# Patient Record
Sex: FEMALE | Race: WHITE | NOT HISPANIC OR LATINO
[De-identification: names, ages, dates, MRNs, and addresses within clinical notes are randomized per-mention and may not be internally consistent; named-entity substitution may affect disease eponyms.]

---

## 2018-04-04 ENCOUNTER — APPOINTMENT (OUTPATIENT)
Dept: ORTHOPEDIC SURGERY | Facility: CLINIC | Age: 78
End: 2018-04-04
Payer: MEDICARE

## 2018-04-04 VITALS
DIASTOLIC BLOOD PRESSURE: 74 MMHG | SYSTOLIC BLOOD PRESSURE: 133 MMHG | HEIGHT: 65.5 IN | BODY MASS INDEX: 21.4 KG/M2 | WEIGHT: 130 LBS | HEART RATE: 67 BPM

## 2018-04-04 DIAGNOSIS — Z80.9 FAMILY HISTORY OF MALIGNANT NEOPLASM, UNSPECIFIED: ICD-10-CM

## 2018-04-04 DIAGNOSIS — Z87.828 PERSONAL HISTORY OF OTHER (HEALED) PHYSICAL INJURY AND TRAUMA: ICD-10-CM

## 2018-04-04 DIAGNOSIS — Z78.9 OTHER SPECIFIED HEALTH STATUS: ICD-10-CM

## 2018-04-04 DIAGNOSIS — M17.0 BILATERAL PRIMARY OSTEOARTHRITIS OF KNEE: ICD-10-CM

## 2018-04-04 PROCEDURE — 20610 DRAIN/INJ JOINT/BURSA W/O US: CPT | Mod: LT

## 2018-04-04 PROCEDURE — 73562 X-RAY EXAM OF KNEE 3: CPT | Mod: 50

## 2018-04-04 PROCEDURE — 99204 OFFICE O/P NEW MOD 45 MIN: CPT | Mod: 25

## 2018-04-11 ENCOUNTER — OTHER (OUTPATIENT)
Age: 78
End: 2018-04-11

## 2018-04-11 DIAGNOSIS — M79.671 PAIN IN RIGHT FOOT: ICD-10-CM

## 2018-04-11 DIAGNOSIS — M79.672 PAIN IN LEFT FOOT: ICD-10-CM

## 2020-08-03 ENCOUNTER — APPOINTMENT (OUTPATIENT)
Dept: FAMILY MEDICINE | Facility: CLINIC | Age: 80
End: 2020-08-03
Payer: MEDICARE

## 2020-08-03 VITALS
HEART RATE: 63 BPM | OXYGEN SATURATION: 98 % | RESPIRATION RATE: 12 BRPM | WEIGHT: 123 LBS | BODY MASS INDEX: 20.16 KG/M2 | TEMPERATURE: 98.4 F

## 2020-08-03 VITALS — SYSTOLIC BLOOD PRESSURE: 130 MMHG | DIASTOLIC BLOOD PRESSURE: 70 MMHG

## 2020-08-03 DIAGNOSIS — M79.606 PAIN IN LEG, UNSPECIFIED: ICD-10-CM

## 2020-08-03 PROCEDURE — 99202 OFFICE O/P NEW SF 15 MIN: CPT
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
## 2020-09-09 ENCOUNTER — APPOINTMENT (OUTPATIENT)
Dept: FAMILY MEDICINE | Facility: CLINIC | Age: 80
End: 2020-09-09
Payer: MEDICARE

## 2020-09-09 VITALS — TEMPERATURE: 98.2 F | OXYGEN SATURATION: 98 % | RESPIRATION RATE: 12 BRPM | HEART RATE: 58 BPM

## 2020-09-09 PROCEDURE — 99213 OFFICE O/P EST LOW 20 MIN: CPT

## 2020-09-09 NOTE — ASSESSMENT
[FreeTextEntry1] : Visit dictation narrative on this 80-year-old white female who is coming in complaining of some left groin and hip pain the patient is going for physical therapy and they recommended to her to get x-rays of her hip and her lumbar sacral spine the patient feels well feels better with the physical therapy so I therefore explained to her that the pain that she feels in the groin is probably from her hip fibroid regular x-rays 2 views of both hips as well as views of the lumbar sacral spine the patient will have the x-rays performed\[facility] And a copy will come to my office and will check for that after I receive the results of diagnosis is 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

## 2020-09-10 LAB
BASOPHILS # BLD AUTO: 0.05 K/UL
BASOPHILS NFR BLD AUTO: 0.8 %
EOSINOPHIL # BLD AUTO: 0.08 K/UL
EOSINOPHIL NFR BLD AUTO: 1.3 %
HCT VFR BLD CALC: 40.4 %
HGB BLD-MCNC: 13.1 G/DL
IMM GRANULOCYTES NFR BLD AUTO: 0.2 %
LYMPHOCYTES # BLD AUTO: 2.36 K/UL
LYMPHOCYTES NFR BLD AUTO: 37.5 %
MAN DIFF?: NORMAL
MCHC RBC-ENTMCNC: 32.4 GM/DL
MCHC RBC-ENTMCNC: 33.8 PG
MCV RBC AUTO: 104.1 FL
MONOCYTES # BLD AUTO: 0.46 K/UL
MONOCYTES NFR BLD AUTO: 7.3 %
NEUTROPHILS # BLD AUTO: 3.34 K/UL
NEUTROPHILS NFR BLD AUTO: 52.9 %
PLATELET # BLD AUTO: 247 K/UL
RBC # BLD: 3.88 M/UL
RBC # FLD: 13.7 %
WBC # FLD AUTO: 6.3 K/UL

## 2020-09-11 LAB
ALBUMIN SERPL ELPH-MCNC: 4.6 G/DL
ALP BLD-CCNC: 98 U/L
ALT SERPL-CCNC: 13 U/L
ANION GAP SERPL CALC-SCNC: 15 MMOL/L
AST SERPL-CCNC: 14 U/L
BILIRUB SERPL-MCNC: 0.2 MG/DL
BUN SERPL-MCNC: 11 MG/DL
CALCIUM SERPL-MCNC: 10.1 MG/DL
CHLORIDE SERPL-SCNC: 100 MMOL/L
CHOLEST SERPL-MCNC: 228 MG/DL
CHOLEST/HDLC SERPL: 2.2 RATIO
CO2 SERPL-SCNC: 24 MMOL/L
CREAT SERPL-MCNC: 0.67 MG/DL
GLUCOSE SERPL-MCNC: 85 MG/DL
HDLC SERPL-MCNC: 105 MG/DL
LDLC SERPL CALC-MCNC: 112 MG/DL
POTASSIUM SERPL-SCNC: 5.6 MMOL/L
PROT SERPL-MCNC: 7.2 G/DL
SODIUM SERPL-SCNC: 139 MMOL/L
T4 FREE SERPL-MCNC: 1.6 NG/DL
TRIGL SERPL-MCNC: 53 MG/DL
TSH SERPL-ACNC: 0.75 UIU/ML

## 2020-11-02 ENCOUNTER — RX RENEWAL (OUTPATIENT)
Age: 80
End: 2020-11-02

## 2020-11-12 ENCOUNTER — APPOINTMENT (OUTPATIENT)
Dept: FAMILY MEDICINE | Facility: CLINIC | Age: 80
End: 2020-11-12
Payer: MEDICARE

## 2020-11-12 VITALS
BODY MASS INDEX: 20.42 KG/M2 | HEART RATE: 67 BPM | TEMPERATURE: 98.2 F | OXYGEN SATURATION: 97 % | RESPIRATION RATE: 12 BRPM | WEIGHT: 124.6 LBS

## 2020-11-12 VITALS — SYSTOLIC BLOOD PRESSURE: 140 MMHG | DIASTOLIC BLOOD PRESSURE: 70 MMHG

## 2020-11-12 PROCEDURE — 99397 PER PM REEVAL EST PAT 65+ YR: CPT | Mod: 25

## 2020-11-12 PROCEDURE — 99072 ADDL SUPL MATRL&STAF TM PHE: CPT

## 2020-11-12 PROCEDURE — 36415 COLL VENOUS BLD VENIPUNCTURE: CPT

## 2020-11-12 NOTE — ASSESSMENT
[FreeTextEntry1] : This is dictation narrative this 80-year-old female who is coming today for a little checkup the patient states that not realizing it and when she awoke at night last week she had a seizure he has a history of a seizure disorder she does not take her medications on a regular basis he is presently on Dilantin 150 mg a day but she says that for many many day she doesn't take it at whatever but she does not take a regular basis I explained to her the importance of being more conscientious of her medications to avoid further seizures in the future also the patient was very anxious and depressed today she lives by herself she has her own home she does have family she has daughters and sons and so on but they really don't pay much attention to her she also has grandchildren who she per are really speaks to I spoke to the patient went for a long time and we discussed various things and I decided to put her on a antidepressant and I explained to her why going to use Celexa 10 mg a day and she is to return back here in the office in 3 weeks I told her to remain on her daily dose of Dilantin at night performed a Dilantin level today because she had taken the medication she says regulated for the past week after having a seizure

## 2020-11-16 LAB — PHENYTOIN SERPL QL: 11.8 UG/ML

## 2020-11-30 ENCOUNTER — APPOINTMENT (OUTPATIENT)
Dept: FAMILY MEDICINE | Facility: CLINIC | Age: 80
End: 2020-11-30
Payer: MEDICARE

## 2020-11-30 VITALS — HEART RATE: 86 BPM | OXYGEN SATURATION: 96 % | RESPIRATION RATE: 14 BRPM | TEMPERATURE: 98.2 F

## 2020-11-30 VITALS — DIASTOLIC BLOOD PRESSURE: 75 MMHG | SYSTOLIC BLOOD PRESSURE: 135 MMHG

## 2020-11-30 PROCEDURE — 99212 OFFICE O/P EST SF 10 MIN: CPT

## 2020-11-30 NOTE — ASSESSMENT
[FreeTextEntry1] : This is a narrative dictation for this 80-year-old female who came into the office today stating that she feels very generally on the Celebrex medication I had given her last week I explained to her that it's possible it can happen is a little unusual but I told her that week to stop the medication she's afraid to immediately stop it so I gave her a list of how to slow down by taking a half a tablet a day for 2-3 days then a half a tablet every other day and then one tablet a half a tablet rather twice a week and then to stop it so she was good with that and feels that she really does want to take any other medication after this

## 2021-02-17 ENCOUNTER — APPOINTMENT (OUTPATIENT)
Dept: FAMILY MEDICINE | Facility: CLINIC | Age: 81
End: 2021-02-17
Payer: MEDICARE

## 2021-02-17 VITALS — OXYGEN SATURATION: 98 % | TEMPERATURE: 97.3 F | RESPIRATION RATE: 14 BRPM | HEART RATE: 88 BPM

## 2021-02-17 DIAGNOSIS — Z23 ENCOUNTER FOR IMMUNIZATION: ICD-10-CM

## 2021-02-17 DIAGNOSIS — S09.93XA UNSPECIFIED INJURY OF FACE, INITIAL ENCOUNTER: ICD-10-CM

## 2021-02-17 PROCEDURE — 99212 OFFICE O/P EST SF 10 MIN: CPT | Mod: 25

## 2021-02-17 PROCEDURE — 99072 ADDL SUPL MATRL&STAF TM PHE: CPT

## 2021-02-17 PROCEDURE — 90471 IMMUNIZATION ADMIN: CPT

## 2021-02-17 PROCEDURE — 90714 TD VACC NO PRESV 7 YRS+ IM: CPT

## 2021-02-17 NOTE — ASSESSMENT
[FreeTextEntry1] : Narrative dictation this 81-year-old female who yesterday while at home fell out of bed onto the floor causing a hematoma around both orbits of her body she has a subconjunctival hemorrhage to her right eye the patient is alert and well-oriented she states she did not have any loss of consciousness she refused to go to emergency room she came in today with 2 large hematomas below both orbits with a superficial laceration to the right cheek the patient is afebrile what I have done is oriented facial x-ray bones which is going for 10 days in addition to that I ordered a DT shot I put the woman on Duricef 500 mg twice a day because of multiple superficial abrasions to her right face as per course the patient also states that she thought she had a seizure several weeks ago and she's wondering same thing taking that in mind even though neurologically she's intact today we sent her for referral for a neurological evaluation

## 2021-02-20 ENCOUNTER — NON-APPOINTMENT (OUTPATIENT)
Age: 81
End: 2021-02-20

## 2021-03-01 ENCOUNTER — APPOINTMENT (OUTPATIENT)
Dept: FAMILY MEDICINE | Facility: CLINIC | Age: 81
End: 2021-03-01
Payer: MEDICARE

## 2021-03-01 VITALS — RESPIRATION RATE: 12 BRPM | HEART RATE: 80 BPM | TEMPERATURE: 96.3 F | OXYGEN SATURATION: 98 %

## 2021-03-01 VITALS — SYSTOLIC BLOOD PRESSURE: 130 MMHG | DIASTOLIC BLOOD PRESSURE: 75 MMHG

## 2021-03-01 PROCEDURE — 99072 ADDL SUPL MATRL&STAF TM PHE: CPT

## 2021-03-01 PROCEDURE — 99212 OFFICE O/P EST SF 10 MIN: CPT
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
## 2021-03-04 ENCOUNTER — APPOINTMENT (OUTPATIENT)
Dept: NEUROLOGY | Facility: CLINIC | Age: 81
End: 2021-03-04
Payer: MEDICARE

## 2021-03-04 VITALS — WEIGHT: 130 LBS | TEMPERATURE: 96.3 F | BODY MASS INDEX: 21.4 KG/M2 | HEIGHT: 65.5 IN

## 2021-03-04 PROCEDURE — 99072 ADDL SUPL MATRL&STAF TM PHE: CPT

## 2021-03-04 PROCEDURE — 99204 OFFICE O/P NEW MOD 45 MIN: CPT

## 2021-03-11 ENCOUNTER — APPOINTMENT (OUTPATIENT)
Dept: NEUROLOGY | Facility: CLINIC | Age: 81
End: 2021-03-11
Payer: MEDICARE

## 2021-03-11 ENCOUNTER — RX RENEWAL (OUTPATIENT)
Age: 81
End: 2021-03-11

## 2021-03-11 PROCEDURE — 95819 EEG AWAKE AND ASLEEP: CPT

## 2021-03-11 PROCEDURE — 99072 ADDL SUPL MATRL&STAF TM PHE: CPT

## 2021-03-11 PROCEDURE — 93040 RHYTHM ECG WITH REPORT: CPT

## 2021-03-12 ENCOUNTER — APPOINTMENT (OUTPATIENT)
Dept: FAMILY MEDICINE | Facility: CLINIC | Age: 81
End: 2021-03-12
Payer: MEDICARE

## 2021-03-12 VITALS — RESPIRATION RATE: 12 BRPM | HEART RATE: 62 BPM | TEMPERATURE: 97.7 F | OXYGEN SATURATION: 98 %

## 2021-03-12 PROCEDURE — 99212 OFFICE O/P EST SF 10 MIN: CPT

## 2021-03-12 PROCEDURE — 99072 ADDL SUPL MATRL&STAF TM PHE: CPT

## 2021-03-12 NOTE — ASSESSMENT
[FreeTextEntry1] : This is a note of dictation on this 81-year-old female who came in today basically concerned that she has pain by her left jaw just below the TMJ joint the patient says she doesn't feel and she presses on it and examination was fairly age of the lower jaw and skull and there is no masses skin is freely movable over it I told her that it could be a consequence of the foreleg she had several weeks ago but did not feel any mass there I told her not to keep on playing with it keep it from her weight she might be for immediate to watch over the next few weeks and then we will see is any change but I don't see any reason for any specific type of therapy or x-rays at this point

## 2021-03-15 ENCOUNTER — NON-APPOINTMENT (OUTPATIENT)
Age: 81
End: 2021-03-15

## 2021-03-29 ENCOUNTER — LABORATORY RESULT (OUTPATIENT)
Age: 81
End: 2021-03-29

## 2021-03-29 ENCOUNTER — APPOINTMENT (OUTPATIENT)
Dept: FAMILY MEDICINE | Facility: CLINIC | Age: 81
End: 2021-03-29
Payer: MEDICARE

## 2021-03-29 VITALS — TEMPERATURE: 97.2 F | HEART RATE: 80 BPM | OXYGEN SATURATION: 98 % | RESPIRATION RATE: 16 BRPM

## 2021-03-29 PROCEDURE — 99212 OFFICE O/P EST SF 10 MIN: CPT | Mod: 25

## 2021-03-29 PROCEDURE — 36415 COLL VENOUS BLD VENIPUNCTURE: CPT

## 2021-03-29 PROCEDURE — 99072 ADDL SUPL MATRL&STAF TM PHE: CPT

## 2021-03-29 NOTE — ASSESSMENT
[FreeTextEntry1] : As an out of dictation on this 81-year-old female who is coming in because of back and knee pains and generalized arthritic pains and speaking to the patient I decided to try her on something a little differently want to try her on Celebrex 200 mg one a day I told the patient that she stop all other pain medication she's not to take any other nonsteroidal anti-inflammatories such as Naprosyn or aspirin and so on she can take Tylenol as an adjunct with this if necessary Celebrex will be taken once a day with a little bit of food she also requests a urinalysis even though she feels well which I applied chest and also she wants her blood count rechecked last time I checked with September which was absolutely normal so we will redo his CBC on the patient and I will notify her of the results when he returns on Wednesday

## 2021-03-29 NOTE — HISTORY OF PRESENT ILLNESS
[___ Days ago] : [unfilled] days ago [Constant] : constant [Severe] : severe [Heat] : heat [Activity] : with activity [In the Morning] : in the morning [Worsening] : worsening [de-identified] : lower back

## 2021-03-31 LAB
APPEARANCE: CLEAR
BASOPHILS # BLD AUTO: 0.03 K/UL
BASOPHILS NFR BLD AUTO: 0.3 %
BILIRUBIN URINE: NEGATIVE
BLOOD URINE: NORMAL
COLOR: YELLOW
EOSINOPHIL # BLD AUTO: 0.07 K/UL
EOSINOPHIL NFR BLD AUTO: 0.7 %
GLUCOSE QUALITATIVE U: NEGATIVE
HCT VFR BLD CALC: 36.1 %
HGB BLD-MCNC: 12.2 G/DL
IMM GRANULOCYTES NFR BLD AUTO: 0.3 %
KETONES URINE: NEGATIVE
LEUKOCYTE ESTERASE URINE: ABNORMAL
LYMPHOCYTES # BLD AUTO: 2.83 K/UL
LYMPHOCYTES NFR BLD AUTO: 29.8 %
MAN DIFF?: NORMAL
MCHC RBC-ENTMCNC: 33.8 GM/DL
MCHC RBC-ENTMCNC: 34.9 PG
MCV RBC AUTO: 103.1 FL
MONOCYTES # BLD AUTO: 0.81 K/UL
MONOCYTES NFR BLD AUTO: 8.5 %
NEUTROPHILS # BLD AUTO: 5.73 K/UL
NEUTROPHILS NFR BLD AUTO: 60.4 %
NITRITE URINE: NEGATIVE
PH URINE: 7
PLATELET # BLD AUTO: 208 K/UL
PROTEIN URINE: NEGATIVE
RBC # BLD: 3.5 M/UL
RBC # FLD: 12.7 %
SPECIFIC GRAVITY URINE: 1
UROBILINOGEN URINE: NORMAL
WBC # FLD AUTO: 9.5 K/UL

## 2021-04-22 ENCOUNTER — APPOINTMENT (OUTPATIENT)
Dept: NEUROLOGY | Facility: CLINIC | Age: 81
End: 2021-04-22
Payer: MEDICARE

## 2021-04-22 VITALS
SYSTOLIC BLOOD PRESSURE: 130 MMHG | BODY MASS INDEX: 21.4 KG/M2 | WEIGHT: 130 LBS | HEIGHT: 65.5 IN | TEMPERATURE: 97.2 F | DIASTOLIC BLOOD PRESSURE: 70 MMHG

## 2021-04-22 PROCEDURE — 99072 ADDL SUPL MATRL&STAF TM PHE: CPT

## 2021-04-22 PROCEDURE — 99214 OFFICE O/P EST MOD 30 MIN: CPT

## 2021-04-22 RX ORDER — LEVETIRACETAM 500 MG/1
500 TABLET, FILM COATED ORAL TWICE DAILY
Qty: 60 | Refills: 5 | Status: DISCONTINUED | COMMUNITY
Start: 2021-03-04 | End: 2021-04-22

## 2021-04-29 ENCOUNTER — APPOINTMENT (OUTPATIENT)
Dept: FAMILY MEDICINE | Facility: CLINIC | Age: 81
End: 2021-04-29
Payer: MEDICARE

## 2021-04-29 VITALS — WEIGHT: 121 LBS | BODY MASS INDEX: 19.83 KG/M2

## 2021-04-29 VITALS — RESPIRATION RATE: 14 BRPM | HEART RATE: 70 BPM | TEMPERATURE: 97.7 F | OXYGEN SATURATION: 98 %

## 2021-04-29 PROCEDURE — 99213 OFFICE O/P EST LOW 20 MIN: CPT

## 2021-04-29 PROCEDURE — 99072 ADDL SUPL MATRL&STAF TM PHE: CPT

## 2021-04-29 RX ORDER — LEVOTHYROXINE SODIUM 200 MCG
VIAL (EA) INTRAVENOUS
Refills: 0 | Status: DISCONTINUED | COMMUNITY
End: 2021-04-29

## 2021-04-29 NOTE — ASSESSMENT
[FreeTextEntry4] : Dictation for this preop evaluation for this 81-year-old female who is scheduled to have a right cataract extraction on May 14.  The patient feels well no complaints.  Blood pressure was excellent 135/75,  her heart had a regular rhythm S1-S2,  lungs were clear,  neck was supple.  It was not required for any blood tests or  EKG for this clearance.  On physical evaluation the patient is cleared for cataract surgery of the right eye.

## 2021-04-29 NOTE — HISTORY OF PRESENT ILLNESS
[FreeTextEntry1] : rt cataract extraction with IOL [FreeTextEntry2] : 5/10/2021 [FreeTextEntry3] : haseeb gresham

## 2021-05-07 ENCOUNTER — APPOINTMENT (OUTPATIENT)
Dept: FAMILY MEDICINE | Facility: CLINIC | Age: 81
End: 2021-05-07
Payer: MEDICARE

## 2021-05-07 VITALS
BODY MASS INDEX: 19.83 KG/M2 | SYSTOLIC BLOOD PRESSURE: 122 MMHG | DIASTOLIC BLOOD PRESSURE: 70 MMHG | WEIGHT: 121 LBS | RESPIRATION RATE: 14 BRPM

## 2021-05-07 VITALS — OXYGEN SATURATION: 96 % | HEART RATE: 75 BPM | TEMPERATURE: 97.5 F

## 2021-05-07 DIAGNOSIS — R09.81 NASAL CONGESTION: ICD-10-CM

## 2021-05-07 DIAGNOSIS — M41.9 SCOLIOSIS, UNSPECIFIED: ICD-10-CM

## 2021-05-07 DIAGNOSIS — R09.82 POSTNASAL DRIP: ICD-10-CM

## 2021-05-07 DIAGNOSIS — R00.8 OTHER ABNORMALITIES OF HEART BEAT: ICD-10-CM

## 2021-05-07 DIAGNOSIS — R05 COUGH: ICD-10-CM

## 2021-05-07 PROCEDURE — 36415 COLL VENOUS BLD VENIPUNCTURE: CPT

## 2021-05-07 PROCEDURE — 99214 OFFICE O/P EST MOD 30 MIN: CPT | Mod: 25

## 2021-05-07 PROCEDURE — 99072 ADDL SUPL MATRL&STAF TM PHE: CPT

## 2021-05-07 PROCEDURE — 93000 ELECTROCARDIOGRAM COMPLETE: CPT

## 2021-05-07 RX ORDER — CEFADROXIL 500 MG/1
500 CAPSULE ORAL TWICE DAILY
Qty: 10 | Refills: 1 | Status: COMPLETED | COMMUNITY
Start: 2021-02-17 | End: 2021-05-07

## 2021-05-07 NOTE — PLAN
[FreeTextEntry1] : see assessment.\par patient to have all reports sent to our office for review and record keeping.\par denies need for medication renewals at this time. \par notify office if worsening/persistent symptoms or new onset complaints/concerns, in the event of any emergency or life threatening condition ie. worsening s/s, worsening SOB, fevers, etc., go to the nearest emergency department and then notify office. \par patient verbalized understanding and agrees with plan of care.

## 2021-05-07 NOTE — ASSESSMENT
[FreeTextEntry1] : SOBOE/ cough/ sys. murmur & S3 on auscultation\par RUIZ, o/w offers no cardiac complaints today\par ECG; NSR, normal result.\par STAT CXR\par BW cbc/ cmp/ COVID19 PCR nasal swab - "labs drawn in office" \par \par sinus congestion/ PND\par start loratadine/ fluticasone\par recheck in 1 week\par stop mucinex\par plenty of fluids/ rest/ vitamin C\par \par seizure disorder\par well managed\par on Dilantin\par see Dr. Jane neurology- apt for Oct. 2021\par \par hypothyroid\par on Levothyroxine\par denies need for med renewal\par \par preventative health\par f/u with pcp for annual wellness 08/03/2021\par refuses vaccines\par no longer completing Pap\par f/u at annual- mammo/ colonoscopy/ DEXA

## 2021-05-07 NOTE — PHYSICAL EXAM
[No Acute Distress] : no acute distress [Well Nourished] : well nourished [Well Developed] : well developed [Normal Sclera/Conjunctiva] : normal sclera/conjunctiva [PERRL] : pupils equal round and reactive to light [No Lymphadenopathy] : no lymphadenopathy [Normal Rate] : normal rate  [Regular Rhythm] : with a regular rhythm [Normal S1, S2] : normal S1 and S2 [No Carotid Bruits] : no carotid bruits [No Edema] : there was no peripheral edema [Normal Posterior Cervical Nodes] : no posterior cervical lymphadenopathy [Normal Anterior Cervical Nodes] : no anterior cervical lymphadenopathy [No Rash] : no rash [Coordination Grossly Intact] : coordination grossly intact [Normal Gait] : normal gait [Normal Affect] : the affect was normal [Normal Insight/Judgement] : insight and judgment were intact [de-identified] : +PND, sinus congestion  [de-identified] : systolic heart murmur, S3 gallop on auscultation

## 2021-05-07 NOTE — HEALTH RISK ASSESSMENT
[No] : In the past 12 months have you used drugs other than those required for medical reasons? No [No falls in past year] : Patient reported no falls in the past year [0] : 2) Feeling down, depressed, or hopeless: Not at all (0) [] : No [de-identified] : minimal activity a/w "does not like to walk with ast device" [de-identified] : well balanced [[id number]] : 0

## 2021-05-07 NOTE — COUNSELING
[Fall prevention counseling provided] : Fall prevention counseling provided [Behavioral health counseling provided] : Behavioral health counseling provided [Sleep ___ hours/day] : Sleep [unfilled] hours/day [Engage in a relaxing activity] : Engage in a relaxing activity [Plan in advance] : Plan in advance [None] : None [Good understanding] : Patient has a good understanding of lifestyle changes and steps needed to achieve self management goal [de-identified] : infection prevention-\par Avoid close contact with those that are sick/if you are sick- limit contact with healthy individuals as much as possible. The CDC recommends staying home (except to get medical care/other necessities) 24 hours after being free of fever without use of fever-reducing medication. Cover your nose/mouth with a tissue when you cough/sneeze and throw the tissue in the garbage after use. Wash your hands with soap and water often or alcohol-based hand  if not available, especially after blowing nose/using tissue. \par Avoid touching eyes, nose, mouth to prevent spread of infection.\par clean and disinfect surfaces and objects contaminated with germs. \par \par For more information you can visit: https://www.cdc.gov

## 2021-05-07 NOTE — REVIEW OF SYSTEMS
[Earache] : no earache [Hearing Loss] : no hearing loss [Nosebleed] : no nosebleeds [Hoarseness] : no hoarseness [Nasal Discharge] : nasal discharge [Sore Throat] : no sore throat [Postnasal Drip] : no postnasal drip [Shortness Of Breath] : no shortness of breath [Wheezing] : no wheezing [Cough] : cough [Dyspnea on Exertion] : dyspnea on exertion [Muscle Pain] : no muscle pain [Skin Rash] : no skin rash [Negative] : Heme/Lymph [FreeTextEntry4] : sinus congestion

## 2021-05-07 NOTE — HISTORY OF PRESENT ILLNESS
[Congestion] : congestion [Moderate] : moderate [___ Days ago] : [unfilled] days ago [Constant] : constant [Cough] : cough [OTC Remedies] : OTC remedies [At Night] : at night [Worsening] : worsening [Sore Throat] : no sore throat [Wheezing] : no wheezing [Chills] : no chills [Anorexia] : no anorexia [Earache] : no earache [Fatigue] : not fatigue [Headache] : no headache [Fever] : no fever [FreeTextEntry7] : chest [FreeTextEntry5] : Mucinex  [FreeTextEntry8] : This is a 80y/o female pmhx hypothyroid/ seizure disorder/ anxiety/ arthritis/ OA knees, presents today with acute concern 5/10 cough productive of yellow sputum and chest congestion; SLIDTA as discussed above.\par patient denies fever, chills, muscle aches, diarrhea, N/V, sore throat.\par reports some sinus congestion and RUIZ o/w in no acute distress.\par patient reports use of OTC Mucinex with minimal relief o/w denies OTC remedies.\par denies known exposure to COVID19, flu, denies out of state travel. \par denies hx MI, htn, heart disease or other cardiac conditions, hx smoker quit 35yrs ago denies hx asthma/ copd/ other lung dx. \par o/w no major concerns.\par will evaluate and manage as discussed.

## 2021-05-10 ENCOUNTER — NON-APPOINTMENT (OUTPATIENT)
Age: 81
End: 2021-05-10

## 2021-05-10 PROBLEM — M41.9 SCOLIOSIS OF THORACIC SPINE: Status: ACTIVE | Noted: 2021-05-10

## 2021-05-10 LAB
ALBUMIN SERPL ELPH-MCNC: 4.4 G/DL
ALP BLD-CCNC: 106 U/L
ALT SERPL-CCNC: 10 U/L
ANION GAP SERPL CALC-SCNC: 15 MMOL/L
AST SERPL-CCNC: 17 U/L
BASOPHILS # BLD AUTO: 0.03 K/UL
BASOPHILS NFR BLD AUTO: 0.4 %
BILIRUB SERPL-MCNC: 0.2 MG/DL
BUN SERPL-MCNC: 9 MG/DL
CALCIUM SERPL-MCNC: 9.5 MG/DL
CHLORIDE SERPL-SCNC: 93 MMOL/L
CO2 SERPL-SCNC: 26 MMOL/L
CREAT SERPL-MCNC: 0.67 MG/DL
EOSINOPHIL # BLD AUTO: 0.23 K/UL
EOSINOPHIL NFR BLD AUTO: 3.3 %
GLUCOSE SERPL-MCNC: 78 MG/DL
HCT VFR BLD CALC: 37.7 %
HGB BLD-MCNC: 12.8 G/DL
IMM GRANULOCYTES NFR BLD AUTO: 0.3 %
LDH SERPL-CCNC: 160 U/L
LYMPHOCYTES # BLD AUTO: 2.34 K/UL
LYMPHOCYTES NFR BLD AUTO: 33.9 %
MAN DIFF?: NORMAL
MCHC RBC-ENTMCNC: 33.9 PG
MCHC RBC-ENTMCNC: 34 GM/DL
MCV RBC AUTO: 99.7 FL
MONOCYTES # BLD AUTO: 0.62 K/UL
MONOCYTES NFR BLD AUTO: 9 %
NEUTROPHILS # BLD AUTO: 3.67 K/UL
NEUTROPHILS NFR BLD AUTO: 53.1 %
PLATELET # BLD AUTO: 213 K/UL
POTASSIUM SERPL-SCNC: 5.7 MMOL/L
PROT SERPL-MCNC: 7.3 G/DL
RBC # BLD: 3.78 M/UL
RBC # FLD: 12 %
SARS-COV-2 N GENE NPH QL NAA+PROBE: NOT DETECTED
SODIUM SERPL-SCNC: 133 MMOL/L
WBC # FLD AUTO: 6.91 K/UL

## 2021-08-17 ENCOUNTER — RX RENEWAL (OUTPATIENT)
Age: 81
End: 2021-08-17

## 2021-08-23 ENCOUNTER — RX RENEWAL (OUTPATIENT)
Age: 81
End: 2021-08-23

## 2021-10-06 ENCOUNTER — RX RENEWAL (OUTPATIENT)
Age: 81
End: 2021-10-06

## 2021-10-12 ENCOUNTER — APPOINTMENT (OUTPATIENT)
Dept: FAMILY MEDICINE | Facility: CLINIC | Age: 81
End: 2021-10-12
Payer: MEDICARE

## 2021-10-12 VITALS — TEMPERATURE: 97.2 F | OXYGEN SATURATION: 96 % | HEART RATE: 50 BPM

## 2021-10-12 VITALS — DIASTOLIC BLOOD PRESSURE: 75 MMHG | SYSTOLIC BLOOD PRESSURE: 130 MMHG

## 2021-10-12 PROCEDURE — 99214 OFFICE O/P EST MOD 30 MIN: CPT

## 2021-10-25 ENCOUNTER — APPOINTMENT (OUTPATIENT)
Dept: NEUROLOGY | Facility: CLINIC | Age: 81
End: 2021-10-25

## 2021-11-01 NOTE — HISTORY OF PRESENT ILLNESS
[FreeTextEntry1] : Pt here for refill medication. Patient also requires a fine needle biopsy for breast nodule.

## 2021-11-01 NOTE — ASSESSMENT
[FreeTextEntry1] : Is an operative dictation for this 81-year-old female who came in for refill of prescriptions patient feels very well no complaints was afebrile blood pressure was 130/7570 heart had a regular rhythm S1-S2 lungs are clear and a prescription for Synthroid and Dilantin were refill she takes Dilantin 150 mg a day as well as Synthroid but she insists on brand medications no generics to return to the office when she needs additional refills

## 2021-12-13 ENCOUNTER — APPOINTMENT (OUTPATIENT)
Dept: FAMILY MEDICINE | Facility: CLINIC | Age: 81
End: 2021-12-13
Payer: MEDICARE

## 2021-12-13 VITALS — OXYGEN SATURATION: 98 % | TEMPERATURE: 97.7 F | HEART RATE: 61 BPM | RESPIRATION RATE: 14 BRPM

## 2021-12-13 VITALS — SYSTOLIC BLOOD PRESSURE: 130 MMHG | DIASTOLIC BLOOD PRESSURE: 80 MMHG

## 2021-12-13 PROCEDURE — 99214 OFFICE O/P EST MOD 30 MIN: CPT

## 2021-12-13 NOTE — ASSESSMENT
[FreeTextEntry4] : Visit noted dictation for medical clearance for this 81-year-old female who was going to [facility] in 2 days to have a mass removed from right axilla the patient had numerous mammographies and saw and no definitive diagnosis which is going in supposedly as a homeless as an outpatient to have this mass removed from her right axilla she feels well I reviewed her medical clearances her EKG reviewed by me is normal laboratory tests are basically all normal except for an elevated potassium at 5.5 make a note now that they should just repeat her electrolytes this is probably a lab dysfunction as far as the 5.5 potassium perhaps even noted no mention anything about hemolysis it soak was probably the cords she denies any medication that would increase her potassium level blood pressure was 130/80 heart had a regular rhythm S1 and S2 and her lungs were clear after reviewing everything the patient is cleared for surgical procedure on Wednesday

## 2021-12-13 NOTE — HISTORY OF PRESENT ILLNESS
[FreeTextEntry1] : Brest surgery ( mass removal) [FreeTextEntry2] : 12/15/201 [FreeTextEntry3] : Dr. Varghese

## 2021-12-29 ENCOUNTER — RX RENEWAL (OUTPATIENT)
Age: 81
End: 2021-12-29

## 2022-04-12 ENCOUNTER — APPOINTMENT (OUTPATIENT)
Dept: FAMILY MEDICINE | Facility: CLINIC | Age: 82
End: 2022-04-12

## 2022-06-13 ENCOUNTER — NON-APPOINTMENT (OUTPATIENT)
Age: 82
End: 2022-06-13

## 2022-07-12 ENCOUNTER — APPOINTMENT (OUTPATIENT)
Dept: RHEUMATOLOGY | Facility: CLINIC | Age: 82
End: 2022-07-12

## 2022-07-26 ENCOUNTER — APPOINTMENT (OUTPATIENT)
Dept: RHEUMATOLOGY | Facility: CLINIC | Age: 82
End: 2022-07-26

## 2022-08-29 ENCOUNTER — RX RENEWAL (OUTPATIENT)
Age: 82
End: 2022-08-29

## 2022-10-24 ENCOUNTER — APPOINTMENT (OUTPATIENT)
Dept: FAMILY MEDICINE | Facility: CLINIC | Age: 82
End: 2022-10-24

## 2022-10-24 VITALS
DIASTOLIC BLOOD PRESSURE: 80 MMHG | SYSTOLIC BLOOD PRESSURE: 118 MMHG | RESPIRATION RATE: 14 BRPM | TEMPERATURE: 97.1 F | HEART RATE: 56 BPM | BODY MASS INDEX: 19.37 KG/M2 | WEIGHT: 118.2 LBS | OXYGEN SATURATION: 98 %

## 2022-10-24 DIAGNOSIS — F41.9 ANXIETY DISORDER, UNSPECIFIED: ICD-10-CM

## 2022-10-24 DIAGNOSIS — M19.90 UNSPECIFIED OSTEOARTHRITIS, UNSPECIFIED SITE: ICD-10-CM

## 2022-10-24 DIAGNOSIS — Z00.00 ENCOUNTER FOR GENERAL ADULT MEDICAL EXAMINATION W/OUT ABNORMAL FINDINGS: ICD-10-CM

## 2022-10-24 DIAGNOSIS — N63.0 UNSPECIFIED LUMP IN UNSPECIFIED BREAST: ICD-10-CM

## 2022-10-24 PROCEDURE — G0444 DEPRESSION SCREEN ANNUAL: CPT

## 2022-10-24 PROCEDURE — G0439: CPT

## 2022-10-24 PROCEDURE — 36415 COLL VENOUS BLD VENIPUNCTURE: CPT

## 2022-10-24 RX ORDER — CITALOPRAM HYDROBROMIDE 10 MG/1
10 TABLET, FILM COATED ORAL DAILY
Qty: 30 | Refills: 2 | Status: DISCONTINUED | COMMUNITY
Start: 2020-11-12 | End: 2022-10-24

## 2022-10-24 RX ORDER — PHENYTOIN SODIUM 100 MG/1
100 CAPSULE ORAL
Qty: 90 | Refills: 3 | Status: ACTIVE | COMMUNITY
Start: 2020-11-02 | End: 1900-01-01

## 2022-10-24 RX ORDER — LORATADINE 10 MG/1
10 TABLET ORAL
Qty: 1 | Refills: 0 | Status: DISCONTINUED | COMMUNITY
Start: 2021-05-07 | End: 2022-10-24

## 2022-10-24 RX ORDER — PHENYTOIN 50 MG/1
50 TABLET, CHEWABLE ORAL
Qty: 90 | Refills: 3 | Status: ACTIVE | COMMUNITY
Start: 2020-09-12 | End: 1900-01-01

## 2022-10-24 NOTE — ASSESSMENT
[FreeTextEntry1] : CPE:\par -will order CBC w/ diff, CMP, Lipid, TSH and HgbA1c, labs to be drawn in office\par \par Screening:\par -Breast cancer screening: due, will order mammogram\par \par Vaccinations:\par Seasonal flu  - recommended\par Pneumococcal - recommended\par Tdap - 2/2021\par Shingles - recommended\par \par -previous notes and labs reviewed, consultants notes reviewed\par -patient expressed understanding of plan, all questions answered\par \par Lymphadenopathy in armpit and posterior neck\par hx of LAD s/p biopsy - found to likely be reactive\par will follow up with  dr. yao for swollen lymph node under arm\par \par Hypothyroidism:\par -will continue current dose of levothyroxine 100mcg daily\par -will check TFTs and make medications adjustments accordingly\par \par Arthritis \par continue celebrex as needed\par \par Hx of seizures\par continue dilantin\par \par Systolic murmur\par asymptomatic\par will refer to cardiology for TTE

## 2022-10-24 NOTE — PHYSICAL EXAM
[No Edema] : there was no peripheral edema [Soft] : abdomen soft [Non Tender] : non-tender [Non-distended] : non-distended [No Masses] : no abdominal mass palpated [Coordination Grossly Intact] : coordination grossly intact [No Focal Deficits] : no focal deficits [Normal Gait] : normal gait [Normal] : affect was normal and insight and judgment were intact [Normal Rate] : normal rate  [Regular Rhythm] : with a regular rhythm [Normal S1, S2] : normal S1 and S2 [de-identified] : systolic murmur LUSB [de-identified] : abdominal incision healing well

## 2022-10-24 NOTE — HEALTH RISK ASSESSMENT
[Former] : Former [10-14] : 10-14 [No] : In the past 12 months have you used drugs other than those required for medical reasons? No [No falls in past year] : Patient reported no falls in the past year [Assistive Device] : Patient uses an assistive device [0] : 2) Feeling down, depressed, or hopeless: Not at all (0) [PHQ-2 Negative - No further assessment needed] : PHQ-2 Negative - No further assessment needed [Patient reported mammogram was normal] : Patient reported mammogram was normal [Alone] : lives alone [Retired] : retired [YearQuit] : 30 years ago [de-identified] : cane [[id number]] : 0 [MammogramDate] : 11/21

## 2022-10-24 NOTE — REVIEW OF SYSTEMS
[Fever] : no fever [Chills] : no chills [Chest Pain] : no chest pain [Palpitations] : no palpitations [Lower Ext Edema] : no lower extremity edema [Shortness Of Breath] : no shortness of breath [Wheezing] : no wheezing [Cough] : no cough [Abdominal Pain] : no abdominal pain [Nausea] : no nausea [Constipation] : no constipation [Vomiting] : no vomiting [Dysuria] : no dysuria [Hematuria] : no hematuria [Joint Pain] : no joint pain [Muscle Pain] : no muscle pain [Headache] : no headache [Dizziness] : no dizziness [Anxiety] : no anxiety [Depression] : no depression
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
## 2022-10-25 ENCOUNTER — NON-APPOINTMENT (OUTPATIENT)
Age: 82
End: 2022-10-25

## 2022-10-25 LAB
ALBUMIN SERPL ELPH-MCNC: 4.3 G/DL
ALP BLD-CCNC: 143 U/L
ALT SERPL-CCNC: 11 U/L
ANION GAP SERPL CALC-SCNC: 9 MMOL/L
AST SERPL-CCNC: 15 U/L
BASOPHILS # BLD AUTO: 0.06 K/UL
BASOPHILS NFR BLD AUTO: 0.8 %
BILIRUB SERPL-MCNC: 0.3 MG/DL
BUN SERPL-MCNC: 9 MG/DL
CALCIUM SERPL-MCNC: 9.3 MG/DL
CHLORIDE SERPL-SCNC: 97 MMOL/L
CO2 SERPL-SCNC: 28 MMOL/L
CREAT SERPL-MCNC: 0.57 MG/DL
EGFR: 90 ML/MIN/1.73M2
EOSINOPHIL # BLD AUTO: 0.33 K/UL
EOSINOPHIL NFR BLD AUTO: 4.2 %
ESTIMATED AVERAGE GLUCOSE: 103 MG/DL
FOLATE SERPL-MCNC: >20 NG/ML
GLUCOSE SERPL-MCNC: 95 MG/DL
HBA1C MFR BLD HPLC: 5.2 %
HCT VFR BLD CALC: 34.6 %
HGB BLD-MCNC: 11.3 G/DL
IMM GRANULOCYTES NFR BLD AUTO: 0.1 %
LYMPHOCYTES # BLD AUTO: 2.14 K/UL
LYMPHOCYTES NFR BLD AUTO: 27.2 %
MAN DIFF?: NORMAL
MCHC RBC-ENTMCNC: 32.7 GM/DL
MCHC RBC-ENTMCNC: 33.8 PG
MCV RBC AUTO: 103.6 FL
MONOCYTES # BLD AUTO: 0.81 K/UL
MONOCYTES NFR BLD AUTO: 10.3 %
NEUTROPHILS # BLD AUTO: 4.53 K/UL
NEUTROPHILS NFR BLD AUTO: 57.4 %
PLATELET # BLD AUTO: 230 K/UL
POTASSIUM SERPL-SCNC: 4.9 MMOL/L
PROT SERPL-MCNC: 7.1 G/DL
RBC # BLD: 3.34 M/UL
RBC # FLD: 13.2 %
SODIUM SERPL-SCNC: 134 MMOL/L
T4 FREE SERPL-MCNC: 1.1 NG/DL
TSH SERPL-ACNC: 2.02 UIU/ML
VIT B12 SERPL-MCNC: 889 PG/ML
WBC # FLD AUTO: 7.88 K/UL

## 2022-11-07 ENCOUNTER — APPOINTMENT (OUTPATIENT)
Dept: CARDIOLOGY | Facility: CLINIC | Age: 82
End: 2022-11-07

## 2022-11-07 ENCOUNTER — NON-APPOINTMENT (OUTPATIENT)
Age: 82
End: 2022-11-07

## 2022-11-07 VITALS
WEIGHT: 118 LBS | BODY MASS INDEX: 19.66 KG/M2 | SYSTOLIC BLOOD PRESSURE: 140 MMHG | HEART RATE: 69 BPM | HEIGHT: 65 IN | DIASTOLIC BLOOD PRESSURE: 68 MMHG | RESPIRATION RATE: 14 BRPM

## 2022-11-07 DIAGNOSIS — R01.1 CARDIAC MURMUR, UNSPECIFIED: ICD-10-CM

## 2022-11-07 DIAGNOSIS — R53.83 OTHER FATIGUE: ICD-10-CM

## 2022-11-07 DIAGNOSIS — Z72.3 LACK OF PHYSICAL EXERCISE: ICD-10-CM

## 2022-11-07 DIAGNOSIS — Z82.49 FAMILY HISTORY OF ISCHEMIC HEART DISEASE AND OTHER DISEASES OF THE CIRCULATORY SYSTEM: ICD-10-CM

## 2022-11-07 PROCEDURE — 99204 OFFICE O/P NEW MOD 45 MIN: CPT | Mod: 25

## 2022-11-07 PROCEDURE — 93000 ELECTROCARDIOGRAM COMPLETE: CPT

## 2022-11-07 PROCEDURE — 93306 TTE W/DOPPLER COMPLETE: CPT

## 2022-11-07 RX ORDER — FLUTICASONE PROPIONATE 50 UG/1
50 SPRAY, METERED NASAL DAILY
Qty: 1 | Refills: 0 | Status: DISCONTINUED | COMMUNITY
Start: 2021-05-07 | End: 2022-11-07

## 2022-11-07 RX ORDER — CELECOXIB 200 MG/1
200 CAPSULE ORAL DAILY
Qty: 90 | Refills: 0 | Status: DISCONTINUED | COMMUNITY
Start: 2021-03-29 | End: 2022-11-07

## 2022-11-07 NOTE — DISCUSSION/SUMMARY
[FreeTextEntry1] : Patient is a 81yo F with family history CAD, former smoker, seizure disorder, hypothyroidism here for cardiac evaluation of a murmur. \par \par -Mild dyspnea/fatigue and reduced exercise tolerance. I recommend nuclear stress testing to ensure not due to ischemic heart disease but  patient not amenable. Difficult to get rides to office. Her echo shows grade 2 diastolic dysfunction with normal BiV systolic function, moderate AS/MR, moderate to severe TR with RVSP 39mmHg. May have component HFpEF contributing to symptoms. \par \par 1. Increase physical activity as tolerated \par 2. Recommend aggressive diet and lifestyle modifications \par 3.  Recommend nuclear stress testing for further evaluation if amenable\par 4. Consider adding trial of lasix\par 5. Follow up in a few months
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
## 2022-11-07 NOTE — PHYSICAL EXAM
[Soft] : abdomen soft [Non Tender] : non-tender [Normal] : no edema, no cyanosis, no clubbing, no varicosities [Moves all extremities] : moves all extremities [Alert and Oriented] : alert and oriented [Normal S1, S2] : normal S1, S2 [No Rub] : no rub [No Gallop] : no gallop [de-identified] : II/VI systolic murmur loudest LLSB and towards apex

## 2022-11-28 ENCOUNTER — APPOINTMENT (OUTPATIENT)
Dept: FAMILY MEDICINE | Facility: CLINIC | Age: 82
End: 2022-11-28

## 2022-11-28 VITALS
DIASTOLIC BLOOD PRESSURE: 60 MMHG | SYSTOLIC BLOOD PRESSURE: 140 MMHG | WEIGHT: 118 LBS | BODY MASS INDEX: 19.66 KG/M2 | HEIGHT: 65 IN

## 2022-11-28 PROCEDURE — 99214 OFFICE O/P EST MOD 30 MIN: CPT

## 2022-11-28 NOTE — PHYSICAL EXAM
[Normal Rate] : normal rate  [Regular Rhythm] : with a regular rhythm [Normal S1, S2] : normal S1 and S2 [No Edema] : there was no peripheral edema [Soft] : abdomen soft [Non Tender] : non-tender [Non-distended] : non-distended [No Masses] : no abdominal mass palpated [Coordination Grossly Intact] : coordination grossly intact [No Focal Deficits] : no focal deficits [Normal Gait] : normal gait [Normal] : affect was normal and insight and judgment were intact [de-identified] : systolic murmur LUSB [de-identified] : right axillary enlarged lymph node, nontender, soft, mobile, right breast with no erythema [de-identified] : abdominal incision healing well

## 2022-11-28 NOTE — ASSESSMENT
[FreeTextEntry1] : Anemia - iron deficiency anemia\par had workup with hematology Dr. 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

## 2022-11-28 NOTE — HISTORY OF PRESENT ILLNESS
[FreeTextEntry1] : follow up to  that was done [de-identified] : 83 y/o female with pmhx of anemia, seizure disorder, arthritis, anxiety, hypothyroidism, presents for follow up. Patient had recent blood work for her CPE on 10/24/22 and was found to have anemia with a hemoglobin of 11.3. Since then, patient was seen by her hematologist Dr. Dillon on 11/23/22. Patient was found to have iron deficiency anemia and started on iron infusions. States that she went for an iron infusion this past friday. Patient also discussed her enlarged axillary lymph node with Dr. Dillon. Patient states that she feels like the lymph node in the right axilla has been getting bigger and her right breast also has grown significantly in size. She was sent for mammogram and sono, which patient states was normal.

## 2023-01-12 ENCOUNTER — APPOINTMENT (OUTPATIENT)
Dept: FAMILY MEDICINE | Facility: CLINIC | Age: 83
End: 2023-01-12
Payer: MEDICARE

## 2023-01-12 VITALS — DIASTOLIC BLOOD PRESSURE: 78 MMHG | RESPIRATION RATE: 14 BRPM | SYSTOLIC BLOOD PRESSURE: 110 MMHG | TEMPERATURE: 97.2 F

## 2023-01-12 DIAGNOSIS — D64.9 ANEMIA, UNSPECIFIED: ICD-10-CM

## 2023-01-12 DIAGNOSIS — R59.0 LOCALIZED ENLARGED LYMPH NODES: ICD-10-CM

## 2023-01-12 DIAGNOSIS — R10.9 UNSPECIFIED ABDOMINAL PAIN: ICD-10-CM

## 2023-01-12 PROCEDURE — 99214 OFFICE O/P EST MOD 30 MIN: CPT

## 2023-01-13 PROBLEM — D64.9 ANEMIA: Status: ACTIVE | Noted: 2022-11-28

## 2023-01-13 PROBLEM — R59.0 LAD (LYMPHADENOPATHY), AXILLARY: Status: ACTIVE | Noted: 2022-11-28

## 2023-01-13 NOTE — PHYSICAL EXAM
[Normal Rate] : normal rate  [Regular Rhythm] : with a regular rhythm [Normal S1, S2] : normal S1 and S2 [No Edema] : there was no peripheral edema [Soft] : abdomen soft [Non-distended] : non-distended [No Masses] : no abdominal mass palpated [Coordination Grossly Intact] : coordination grossly intact [No Focal Deficits] : no focal deficits [Normal Gait] : normal gait [Normal] : affect was normal and insight and judgment were intact [de-identified] : systolic murmur LUSB [de-identified] : right axillary enlarged lymph node, nontender, soft, mobile, right breast with no erythema [de-identified] : abdominal incision well healed, mild tenderness to palpation of LLQ, hyperactive bowel sounds

## 2023-01-13 NOTE — HISTORY OF PRESENT ILLNESS
[FreeTextEntry8] : 81 y/o female with pmhx of anemia, seizure disorder, arthritis, anxiety, hypothyroidism, presents for abdominal discomfort. Patient states that on Friday she began having some abdominal pain and lots of belching.  States that at that time she also had some chills denies any fever.  States that she also felt as if she was constipated.  She was seen at urgent care on Monday and a CT of the abdomen was performed. CT abdomen revealed no evidence of obstruction, but evidence of irregular and heterogeneous mural thickening and luminal narrowing involving the ascending colon extending to the region of the hepatic flexure.  There was evidence of masslike lesions within the mesentery suggestive of mesenteric lymphadenopathy.  Neoplastic involvement of the ascending colon cannot be excluded.  She was recommended for colonoscopy.  Patient has not yet seen her GI doctor.  CT scan also revealed a heterogeneous masslike density in the perineum possibly in the right region of the uterine cervix.  Patient does not remember the last time she had a Pap smear.\par \par Patient states that the abdominal pain has diminished at this time however she is still having large amounts of belching.  States that every time she touches her abdomen she belches.  She has not tried any medications to help this.  States that she felt as if she was constipated so she has been taking MiraLAX which resulted in some diarrhea. Unable to precisely determine when last bowel movement was\par no weight loss

## 2023-01-13 NOTE — REVIEW OF SYSTEMS
[Abdominal Pain] : abdominal pain [Constipation] : constipation [Diarrhea] : diarrhea [Fever] : no fever [Chills] : no chills [Night Sweats] : no night sweats [Chest Pain] : no chest pain [Palpitations] : no palpitations [Nausea] : no nausea [Vomiting] : no vomiting [Dysuria] : no dysuria [Hematuria] : no hematuria [Headache] : no headache [Dizziness] : no dizziness

## 2023-01-13 NOTE — ASSESSMENT
[FreeTextEntry1] : Abdominal pain\par recent CT scan results reviewed with patient\par CT revealed evidence of irregular and heterogeneous mural thickening and luminal narrowing involving the ascending colon extending to the region of the hepatic flexure.  There was evidence of masslike lesions within the mesentery suggestive of mesenteric lymphadenopathy.  Neoplastic involvement of the ascending colon cannot be excluded. \par will refer to GI for colonoscopy\par for frequent belching - recommend trying simethicone\par can continue miralax as needed for constipation\par Patient advised that if she is unable to have a bowel movement, experiences severe abdominal pain, fevers or chills, or blood in the stools she should go to the emergency room for evaluation\par \par Abnormal CT findings and perineum\par Heterogeneous masslike density in perineum possibly related to region of uterine cervix identified on CT scan\par patient unsure of when her last pap smear was\par Will refer patient to GYN for further evaluation\par \par Anemia - iron deficiency anemia\par had workup with hematology Dr. 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

## 2023-01-19 ENCOUNTER — NON-APPOINTMENT (OUTPATIENT)
Age: 83
End: 2023-01-19

## 2023-02-01 ENCOUNTER — APPOINTMENT (OUTPATIENT)
Dept: ORTHOPEDIC SURGERY | Facility: CLINIC | Age: 83
End: 2023-02-01
Payer: MEDICARE

## 2023-02-01 VITALS
DIASTOLIC BLOOD PRESSURE: 80 MMHG | HEART RATE: 58 BPM | BODY MASS INDEX: 19.66 KG/M2 | WEIGHT: 118 LBS | SYSTOLIC BLOOD PRESSURE: 163 MMHG | HEIGHT: 65 IN

## 2023-02-01 DIAGNOSIS — M16.12 UNILATERAL PRIMARY OSTEOARTHRITIS, LEFT HIP: ICD-10-CM

## 2023-02-01 PROCEDURE — 99204 OFFICE O/P NEW MOD 45 MIN: CPT

## 2023-02-01 PROCEDURE — 73502 X-RAY EXAM HIP UNI 2-3 VIEWS: CPT

## 2023-02-01 NOTE — PHYSICAL EXAM
[Antalgic] : antalgic [LE] : Sensory: Intact in bilateral lower extremities [ALL] : dorsalis pedis, posterior tibial, femoral, popliteal, and radial 2+ and symmetric bilaterally [de-identified] : GENERAL APPEARANCE: Well nourished and hydrated, pleasant, alert, and oriented x 3. Appears their stated age. \par HEENT: Normocephalic, extraocular eye motion intact. Nasal septum midline. Oral cavity clear. External auditory canal clear. \par RESPIRATORY: Breath sounds clear and audible in all lobes. No wheezing, No accessory muscle use.\par CARDIOVASCULAR: No apparent abnormalities. No lower leg edema. No varicosities. Pedal pulses are palpable.\par NEUROLOGIC: Sensation is normal, no muscle weakness in the upper or lower extremities.\par DERMATOLOGIC: No apparent skin lesions, moist, warm, no rash.\par SPINE: Cervical spine appears normal and moves freely; thoracic spine appears normal and moves freely; lumbosacral spine appears normal and moves freely, normal, nontender.\par MUSCULOSKELETAL: Hands, wrists, and elbows are normal and move freely, shoulders are normal and move freely.  [de-identified] : Left hip exam shows signification stiffness with attempted ROM. antalgic gait, mild hip flexion contracture. 		  [de-identified] : 3V xray of the left hip done in the office today and reviewed by Dr. 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

## 2023-02-01 NOTE — DISCUSSION/SUMMARY
[Medication Risks Reviewed] : Medication risks reviewed [Surgical risks reviewed] : Surgical risks reviewed [de-identified] : 81 y/o F pt presents with bone on bone left hip osteoarthritis. The nature of her condition and treatment options were discussed in detail. The pt is a reasonable candidate for a left MOOKIE. The surgery was discussed in detail. The pt understands she needs a dual mobility implant due to her hx of epilepsy to decrease her risk of dislocation. She is on dilantin. She has failed conservative therapy such as antiinflammatories. She will talk with the surgical coordinator to schedule a left MOOKIE. All questions were answered. \par \par The patient is a 82 year individual with end stage arthritis of their left hip joint. Based upon the patient's continued symptoms and failure to respond to conservative treatment (including HA injections, cortisone injections, over the counter medications, and PT) I have recommended a left total hip arthroplasty for this patient. A long discussion took place with the patient describing what a total joint replacement is and what the procedure would entail. A total hip arthroplasty model, similar to the implant that will be used during the operation, was utilized to demonstrate and to discuss the various bearing surfaces of the implants. The hospitalization and post-operative care and rehabilitation were also discussed. The use of perioperative antibiotics and DVT prophylaxis were discussed. The risk, benefits and alternatives to a surgical intervention were discussed at length with the patient. The patient was also advised of risks related to the medical comorbidities, elevated body mass index (BMI), and smoking where applicable. We discussed how to reduce modifiable risk factors and encouraged smoking cessation were applicable. A lengthy discussion took place to review the most common complications including but not limited to: deep vein thrombosis, pulmonary embolus, heart attack, stroke, infection, wound breakdown, numbness, damage to nerves, tendon, muscles, arteries or other blood vessels, death and other possible complications from anesthesia. The patient was told that we will take steps to minimize these risks by using sterile technique, antibiotics and DVT prophylaxis when appropriate and follow the patient postoperatively in the office setting to monitor progress. The possibility of recurrent pain, no improvement in pain and actual worsening of pain were also discussed with the patient.\par The discharge plan of care focused on the patient going home following surgery. The patient was encouraged to make the necessary arrangements to have someone stay with them when they are discharged home. Following discharge, a home care nurse will visit the patient. The home care nurse will open your home care case and request home physical therapy services. Home physical therapy will commence following discharge provided it is appropriate and covered by the health insurance benefit plan. \par The benefits of surgery were discussed with the patient including the potential for improving her current clinical condition through operative intervention. Alternatives to surgical intervention including continued conservative management were also discussed in detail. All questions were answered to the satisfaction of the patient. The treatment plan of care, as well as a model of a total hip arthroplasty equivalent to the one that will be used for their total joint replacement, was shared with the patient. The patient agreed to the plan of care as well as the use of implants in their total joint replacement.

## 2023-02-01 NOTE — END OF VISIT
[FreeTextEntry3] : I, Felicity Fuentes, acted solely as a scribe for Dr. Sky Mcgregor on 02/01/2023

## 2023-02-01 NOTE — HISTORY OF PRESENT ILLNESS
[8] : a current pain level of 8/10 [Worsening] : worsening [de-identified] : 83 y/o F pt is here for left hip pain. she recalls she had groin pain while watering the plants in the spring and the pain is progressively worsening. She states it is severe and it is 8/10. She states the pain is located in the groin. she c/o inability to take care of her self and not able to do any house chores, even simple dusting. \par She describes the pain as burning. She feels her pain is exacerbated with walking. She takes no pain medication\par She has a hx of epilepsy on dilantin. She was last seen by Dr. Mcgregor in 2018 for advanced tricompartmental osteoarthritis of both knees and the knees are bothering her too. \par

## 2023-02-10 ENCOUNTER — APPOINTMENT (OUTPATIENT)
Dept: GASTROENTEROLOGY | Facility: CLINIC | Age: 83
End: 2023-02-10
Payer: MEDICARE

## 2023-02-10 VITALS
HEART RATE: 65 BPM | DIASTOLIC BLOOD PRESSURE: 80 MMHG | HEIGHT: 65 IN | OXYGEN SATURATION: 98 % | WEIGHT: 118 LBS | SYSTOLIC BLOOD PRESSURE: 130 MMHG | BODY MASS INDEX: 19.66 KG/M2 | RESPIRATION RATE: 16 BRPM

## 2023-02-10 PROCEDURE — 99203 OFFICE O/P NEW LOW 30 MIN: CPT

## 2023-02-10 RX ORDER — POLYETHYLENE GLYCOL 3350 17 G/17G
17 POWDER, FOR SOLUTION ORAL
Qty: 1 | Refills: 0 | Status: ACTIVE | COMMUNITY
Start: 2023-02-10 | End: 1900-01-01

## 2023-02-10 NOTE — ASSESSMENT
[FreeTextEntry1] : 83-year-old lady history of anxiety, arthritis, anemia, aortic stenosis, hypothyroidism, seizure disorder, former smoker, here for abnormal CT scan

## 2023-02-10 NOTE — HISTORY OF PRESENT ILLNESS
[FreeTextEntry1] : 83-year-old lady history of anxiety, arthritis, anemia, aortic stenosis, hypothyroidism, seizure disorder, former smoker, here for abnormal CT scan\par \par This CT scan which was performed January 10, 2023 reads:\par Abnormal appearance in the ascending colon with irregular mural thickening and luminal narrowing.  Associated low-attenuation masslike lesions within the mesentery suggestive of mesenteric lymphadenopathy.  Neoplastic involvement of the ascending colon cannot be excluded with a differential diagnosis including an inflammatory/infectious process.  1.7 x 1.7 cm focus in the mesentery suggestive of a necrotic lymph node.  4.2 x 3.6 cm larger poorly defined attenuated lesion also found inferiorly to this lymph node.  Colonoscopy suggested.\par Scattered diverticuli.\par Heterogeneous masslike density is seen in the perineum, possibly related to the region of the uterine cervix.\par \par Common bile duct dilation.  There is a mildly dilated CBD (9 to 10 mm) without any definite evidence of choledocholithiasis.  Distal stricture cannot be excluded (distal portion of the CBD is poorly visualized).  Associated mild degree of gallbladder distention is also seen but there is no intrahepatic biliary duct dilation..  Focal pancreatic lesion.\par Hepatic hemangioma.  2 cm, right lobe.\par \par She reports belching and midline abdominal bloat.  She also reports that she is constipated and feels vivienne to have 2 bowel movements a week.  She has not seen any blood or mucus in the bowel movements.  She has not lost any weight recently.  She had a surgery for lysis of adhesions at [facility] in May 2022 at which no abnormalities in her colon were noted.\par She reports dysphagia with solid food.  She also reports longstanding GERD.  She has a history of smoking distantly.

## 2023-03-14 ENCOUNTER — TRANSCRIPTION ENCOUNTER (OUTPATIENT)
Age: 83
End: 2023-03-14

## 2023-03-15 ENCOUNTER — APPOINTMENT (OUTPATIENT)
Dept: GASTROENTEROLOGY | Facility: HOSPITAL | Age: 83
End: 2023-03-15

## 2023-03-15 ENCOUNTER — OUTPATIENT (OUTPATIENT)
Dept: OUTPATIENT SERVICES | Facility: HOSPITAL | Age: 83
LOS: 1 days | End: 2023-03-15
Payer: MEDICARE

## 2023-03-15 ENCOUNTER — RESULT REVIEW (OUTPATIENT)
Age: 83
End: 2023-03-15

## 2023-03-15 DIAGNOSIS — R93.5 ABNORMAL FINDINGS ON DIAGNOSTIC IMAGING OF OTHER ABDOMINAL REGIONS, INCLUDING RETROPERITONEUM: ICD-10-CM

## 2023-03-15 DIAGNOSIS — T14.8 OTHER INJURY OF UNSPECIFIED BODY REGION: Chronic | ICD-10-CM

## 2023-03-15 PROCEDURE — 45380 COLONOSCOPY AND BIOPSY: CPT | Mod: XS

## 2023-03-15 PROCEDURE — 88305 TISSUE EXAM BY PATHOLOGIST: CPT

## 2023-03-15 PROCEDURE — 45380 COLONOSCOPY AND BIOPSY: CPT

## 2023-03-15 PROCEDURE — 88342 IMHCHEM/IMCYTCHM 1ST ANTB: CPT

## 2023-03-15 PROCEDURE — 88342 IMHCHEM/IMCYTCHM 1ST ANTB: CPT | Mod: 26

## 2023-03-15 PROCEDURE — 88305 TISSUE EXAM BY PATHOLOGIST: CPT | Mod: 26

## 2023-03-15 PROCEDURE — 43239 EGD BIOPSY SINGLE/MULTIPLE: CPT

## 2023-03-15 PROCEDURE — 45385 COLONOSCOPY W/LESION REMOVAL: CPT

## 2023-03-15 DEVICE — NAIL OSTEO 1.5X16MM STRL: Type: IMPLANTABLE DEVICE | Status: FUNCTIONAL

## 2023-03-15 NOTE — HISTORY OF PRESENT ILLNESS
[FreeTextEntry1] : 83 F h/o anxiety, arthritis, hypothyroidism, GUILLERMO surgery '22 [facility], here for EGD for GERD/dysphagia and colonoscopy for abnl CT (mass in asc colon). \par

## 2023-03-15 NOTE — ASSESSMENT
Still smoking  Urged smoking cessation  Cont present inhalers   Recheck 3m [FreeTextEntry1] : 83 F h/o anxiety, arthritis, hypothyroidism, GUILLERMO surgery '22 [facility], here for EGD for GERD/dysphagia and colonoscopy for abnl CT (mass in asc colon). \par \par The patient is medically optimized for procedure(s). All questions have been answered. The risks and benefits of the procedure have been discussed and the patient signed consent for the procedure. Preliminary results will be discussed when the patient wakes up from anesthesia, but definitive results will be discussed after the pathology report is issued in 5 business days.\par

## 2023-03-17 ENCOUNTER — APPOINTMENT (OUTPATIENT)
Dept: ORTHOPEDIC SURGERY | Facility: CLINIC | Age: 83
End: 2023-03-17
Payer: MEDICARE

## 2023-03-17 VITALS
BODY MASS INDEX: 19.66 KG/M2 | HEART RATE: 74 BPM | HEIGHT: 65 IN | DIASTOLIC BLOOD PRESSURE: 74 MMHG | SYSTOLIC BLOOD PRESSURE: 146 MMHG | WEIGHT: 118 LBS

## 2023-03-17 PROCEDURE — 99215 OFFICE O/P EST HI 40 MIN: CPT

## 2023-03-17 NOTE — DISCUSSION/SUMMARY
[de-identified] : MARTINA BURROUGHS is a 83 year old female who presents with left hip bone on bone arthritis.  She is in severe debilitating pain and very limited activities of daily living and walking even short distances.  The patient will follow up with her GI doctor or a colorectal surgeon regarding a plan for treatment of her possible colon cancer. She will also follow up with them to determine if it makes sense to undergo THR surgery before treatment of her colon cancer for pain relief as once she starts treatment, if chemotherapy is required, she will not be able to have a hip replacement due to the risk of infection.  She is certainly at increased perioperative risk given her possible active cancer, elderly age, and functional limitation.\par \par  A discussion was had with the patient regarding a left total hip replacement. Anterior and posterior exposures were discussed. For this patient an anterior approach is appropriate. A long discussion was had with the patient as what the total joint replacement would entail. A model was used to demonstrate the operation and to discuss bearing surfaces of the implants. The hospitalization and rehabilitation were discussed. The use of perioperative antibiotics and DVT prophylaxis were discussed. The risks, benefits and alternatives to surgical intervention were discussed at length with the patient. Specific risks discussed included: infection, wound breakdown, numbness and damage to nerves, tendon, muscle, arteries or other blood vessels, and the possibility of leg length discrepancy. The possibility of recurrent pain, no improvement in pain and actual worsening of the pain were also mentioned in conversation with the patient. Medical complications related to the patient's general medical health including deep vein thrombosis, pulmonary embolism, heart attack, stroke, death and other complications from anesthesia were discussed as well. The patient was told that we will take steps to minimize these risks by using sterile technique, antibiotics and DVT prophylaxis when appropriate and following the patient postoperatively in the clinic setting to monitor progress. The benefits of surgery were discussed with the patient including the potential to improve the current clinical condition through operative intervention. Alternatives to surgical intervention include continued conservative management which may yield less than optimal results in this particular patient. All questions were answered to the satisfaction of the patient.

## 2023-03-17 NOTE — HISTORY OF PRESENT ILLNESS
[de-identified] : Ms. MARTINA BURROUGHS is a 83 year old female presenting for evaluation of longstanding left hip pain. Patient notes the pain is localized to the left groin and is worse with all weightbearing activity and with all ROM. Patient has not had injections thus far. She has tried therapy without improvement. She has tried Pt without improvement. Patient is hopeful to discuss THR. \par Two days ago she had a colonoscopy and was told she has colon cancer that requires a resection.

## 2023-03-17 NOTE — PHYSICAL EXAM
[de-identified] : The patient appears well nourished  and in no apparent distress.  The patient is alert and oriented to person, place, and time.   Affect and mood appear normal. The head is normocephalic and atraumatic.  The eyes reveal normal sclera and extra ocular muscles are intact. The tongue is midline with no apparent lesions.  Skin shows normal turgor with no evidence of eczema or psoriasis.  No respiratory distress noted.  Sensation grossly intact.		  [de-identified] : Exam of the left hip shows hip flexion of 90 degrees(limited by pain), hip external rotation of 15 degrees, hip internal rotation of 0 degrees. There is severe pain throughout range of motion.\par 5/5 motor strength bilaterally distally. Sensation intact distally.		  [de-identified] : X-ray: AP view of the pelvis and 2 views of the left hip demonstrate bone on bone arthritis with femoral head collapse.

## 2023-03-17 NOTE — ADDENDUM
[FreeTextEntry1] : This note was authored by Tyrone Billingsley working as a medical scribe for Dr. Yobany Ventura. The note was reviewed, edited, and revised by Dr. Yobany Ventura whom is in agreement with the exam findings, imaging findings, and treatment plan. 03/17/2023

## 2023-03-17 NOTE — CONSULT LETTER
[Dear  ___] : Dear  [unfilled], [Consult Letter:] : I had the pleasure of evaluating your patient, [unfilled]. [Please see my note below.] : Please see my note below. [Consult Closing:] : Thank you very much for allowing me to participate in the care of this patient.  If you have any questions, please do not hesitate to contact me. [Sincerely,] : Sincerely, [FreeTextEntry2] : MONA STUART MD\par  [FreeTextEntry3] : Yobany Ventura MD\par Chief of Joint Replacement\par [facility] \par [facility]\par \par

## 2023-03-20 ENCOUNTER — NON-APPOINTMENT (OUTPATIENT)
Age: 83
End: 2023-03-20

## 2023-03-20 LAB — SURGICAL PATHOLOGY STUDY: SIGNIFICANT CHANGE UP

## 2023-03-21 ENCOUNTER — NON-APPOINTMENT (OUTPATIENT)
Age: 83
End: 2023-03-21

## 2023-03-23 ENCOUNTER — APPOINTMENT (OUTPATIENT)
Dept: COLORECTAL SURGERY | Facility: CLINIC | Age: 83
End: 2023-03-23
Payer: MEDICARE

## 2023-03-23 VITALS
WEIGHT: 125 LBS | RESPIRATION RATE: 14 BRPM | OXYGEN SATURATION: 98 % | DIASTOLIC BLOOD PRESSURE: 56 MMHG | BODY MASS INDEX: 20.09 KG/M2 | HEIGHT: 66 IN | SYSTOLIC BLOOD PRESSURE: 144 MMHG | HEART RATE: 63 BPM | TEMPERATURE: 97.7 F

## 2023-03-23 DIAGNOSIS — R93.5 ABNORMAL FINDINGS ON DIAGNOSTIC IMAGING OF OTHER ABDOMINAL REGIONS, INCLUDING RETROPERITONEUM: ICD-10-CM

## 2023-03-23 PROCEDURE — 99204 OFFICE O/P NEW MOD 45 MIN: CPT

## 2023-03-23 RX ORDER — METRONIDAZOLE 500 MG/1
500 TABLET ORAL
Qty: 3 | Refills: 0 | Status: ACTIVE | COMMUNITY
Start: 2023-03-23 | End: 1900-01-01

## 2023-03-23 RX ORDER — NEOMYCIN SULFATE 500 MG/1
500 TABLET ORAL
Qty: 6 | Refills: 0 | Status: ACTIVE | COMMUNITY
Start: 2023-03-23 | End: 1900-01-01

## 2023-03-23 RX ORDER — SODIUM SULFATE, MAGNESIUM SULFATE, AND POTASSIUM CHLORIDE 17.75; 2.7; 2.25 G/1; G/1; G/1
1479-225-188 TABLET ORAL
Qty: 24 | Refills: 0 | Status: COMPLETED | COMMUNITY
Start: 2023-03-13 | End: 2023-03-23
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
## 2023-03-23 NOTE — PHYSICAL EXAM
[No Rash or Lesion] : No rash or lesion [Alert] : alert [Oriented to Person] : oriented to person [Oriented to Place] : oriented to place [Oriented to Time] : oriented to time [Calm] : calm [de-identified] : No apparent distress [de-identified] : Normocephalic atraumatic [de-identified] : Moving all extremities x4

## 2023-03-23 NOTE — ASSESSMENT
[FreeTextEntry1] : Ms. 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  We discussed the R/B/A for a laparoscopic right hemicolectomy including preop surgical testing, mechanical and oral abx bowel prep, duration of procedure including margins for oncologic resection, criterion for hospital discharge, period for postoperative recovery as well as bowel function to be expected after partial colectomy. The potential for anastomotic leak ~2-3% was also detailed. Other risks of surgery such as wound infection, prolonged postop ileus, DVT, PE, pneumonia and MI were detailed.   She was advised on the potential need for chemotherapy should lymph nodes return positive.\par I have asked her to obtain a CT chest/abdomen/pelvis as baseline imaging and to have this completed at the [facility] imaging centers for my review.  We will plan to have her scheduled within the month and she is aware that medical and cardiac clearance will need to be obtained.\par

## 2023-03-28 ENCOUNTER — APPOINTMENT (OUTPATIENT)
Dept: FAMILY MEDICINE | Facility: CLINIC | Age: 83
End: 2023-03-28
Payer: MEDICARE

## 2023-03-28 ENCOUNTER — APPOINTMENT (OUTPATIENT)
Dept: CARDIOLOGY | Facility: CLINIC | Age: 83
End: 2023-03-28
Payer: MEDICARE

## 2023-03-28 ENCOUNTER — NON-APPOINTMENT (OUTPATIENT)
Age: 83
End: 2023-03-28

## 2023-03-28 VITALS
DIASTOLIC BLOOD PRESSURE: 72 MMHG | BODY MASS INDEX: 18.8 KG/M2 | SYSTOLIC BLOOD PRESSURE: 144 MMHG | HEIGHT: 66 IN | HEART RATE: 63 BPM | OXYGEN SATURATION: 97 % | RESPIRATION RATE: 15 BRPM | WEIGHT: 117 LBS

## 2023-03-28 VITALS
WEIGHT: 125 LBS | HEIGHT: 66 IN | BODY MASS INDEX: 20.09 KG/M2 | DIASTOLIC BLOOD PRESSURE: 60 MMHG | SYSTOLIC BLOOD PRESSURE: 136 MMHG

## 2023-03-28 DIAGNOSIS — I35.0 NONRHEUMATIC AORTIC (VALVE) STENOSIS: ICD-10-CM

## 2023-03-28 DIAGNOSIS — Z01.818 ENCOUNTER FOR OTHER PREPROCEDURAL EXAMINATION: ICD-10-CM

## 2023-03-28 DIAGNOSIS — I34.0 NONRHEUMATIC MITRAL (VALVE) INSUFFICIENCY: ICD-10-CM

## 2023-03-28 DIAGNOSIS — G40.909 EPILEPSY, UNSPECIFIED, NOT INTRACTABLE, W/OUT STATUS EPILEPTICUS: ICD-10-CM

## 2023-03-28 DIAGNOSIS — Z82.49 FAMILY HISTORY OF ISCHEMIC HEART DISEASE AND OTHER DISEASES OF THE CIRCULATORY SYSTEM: ICD-10-CM

## 2023-03-28 DIAGNOSIS — E03.9 HYPOTHYROIDISM, UNSPECIFIED: ICD-10-CM

## 2023-03-28 DIAGNOSIS — Z87.891 PERSONAL HISTORY OF NICOTINE DEPENDENCE: ICD-10-CM

## 2023-03-28 DIAGNOSIS — R06.00 DYSPNEA, UNSPECIFIED: ICD-10-CM

## 2023-03-28 DIAGNOSIS — Z01.810 ENCOUNTER FOR PREPROCEDURAL CARDIOVASCULAR EXAMINATION: ICD-10-CM

## 2023-03-28 DIAGNOSIS — I36.1 NONRHEUMATIC TRICUSPID (VALVE) INSUFFICIENCY: ICD-10-CM

## 2023-03-28 PROCEDURE — 93000 ELECTROCARDIOGRAM COMPLETE: CPT | Mod: NC

## 2023-03-28 PROCEDURE — 99214 OFFICE O/P EST MOD 30 MIN: CPT | Mod: 25

## 2023-03-28 PROCEDURE — 99214 OFFICE O/P EST MOD 30 MIN: CPT

## 2023-03-28 PROCEDURE — 93306 TTE W/DOPPLER COMPLETE: CPT

## 2023-03-28 RX ORDER — FUROSEMIDE 40 MG/1
40 TABLET ORAL
Qty: 30 | Refills: 0 | Status: DISCONTINUED | COMMUNITY
Start: 2022-07-05 | End: 2023-03-28

## 2023-03-28 NOTE — HISTORY OF PRESENT ILLNESS
[FreeTextEntry1] : patient is here because she recently had a colonoscopy and is here to discuss the next steps

## 2023-03-29 ENCOUNTER — NON-APPOINTMENT (OUTPATIENT)
Age: 83
End: 2023-03-29

## 2023-03-29 ENCOUNTER — APPOINTMENT (OUTPATIENT)
Dept: COLORECTAL SURGERY | Facility: CLINIC | Age: 83
End: 2023-03-29

## 2023-03-29 PROBLEM — Z01.818 PREOPERATIVE EXAMINATION: Status: RESOLVED | Noted: 2023-03-29 | Resolved: 2023-04-28

## 2023-03-29 PROBLEM — G40.909 SEIZURE DISORDER: Status: ACTIVE | Noted: 2020-09-12

## 2023-03-29 PROBLEM — E03.9 HYPOTHYROIDISM: Status: ACTIVE | Noted: 2020-09-12

## 2023-03-30 ENCOUNTER — NON-APPOINTMENT (OUTPATIENT)
Age: 83
End: 2023-03-30

## 2023-03-31 ENCOUNTER — APPOINTMENT (OUTPATIENT)
Dept: CARDIOLOGY | Facility: CLINIC | Age: 83
End: 2023-03-31
Payer: MEDICARE

## 2023-03-31 PROCEDURE — A9500: CPT

## 2023-03-31 PROCEDURE — 78452 HT MUSCLE IMAGE SPECT MULT: CPT

## 2023-03-31 PROCEDURE — 93015 CV STRESS TEST SUPVJ I&R: CPT

## 2023-04-01 ENCOUNTER — APPOINTMENT (OUTPATIENT)
Dept: CT IMAGING | Facility: CLINIC | Age: 83
End: 2023-04-01

## 2023-04-04 ENCOUNTER — APPOINTMENT (OUTPATIENT)
Dept: COLORECTAL SURGERY | Facility: CLINIC | Age: 83
End: 2023-04-04
Payer: MEDICARE

## 2023-04-04 PROCEDURE — 99443: CPT

## 2023-04-05 NOTE — ADDENDUM
[FreeTextEntry1] : Echo done today, normal LV function with EF 60-65%, grade II diastolic dysfunction, mild AS, moderate Mr, severe TR with mild PHTN RVSP 45mmHg. Fairly stable findings, at risk CHF but not clinically in CHF today. Plan for pharm nuclear stress to further risk stratify prior to surgery. \par \par \par 4/5/2023: Pharm nuclear stress negative for ischemia. CV stable at low-moderate risk for surgery.

## 2023-04-05 NOTE — ASSESSMENT
[FreeTextEntry1] : ECG: SR, no significant ST-T abnormalities and normal intervals \par \par \par \par ECHO 11/2022:\par 1. Normal LV size and function, EF 65-70%\par 2. Grade II diastolic dysfunction \par 3. Normal RV function\par 4. Moderate LAE,  borderline SANCHO\par 5. Moderate AS and MR\par 6. Moderate to severe TR, RVSP 39mmHg
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
## 2023-04-05 NOTE — PHYSICAL EXAM
[Normal S1, S2] : normal S1, S2 [No Rub] : no rub [No Gallop] : no gallop [Soft] : abdomen soft [Non Tender] : non-tender [Normal] : no edema, no cyanosis, no clubbing, no varicosities [Moves all extremities] : moves all extremities [Alert and Oriented] : alert and oriented [de-identified] : II/VI systolic murmur loudest LLSB and towards apex

## 2023-04-06 ENCOUNTER — RESULT REVIEW (OUTPATIENT)
Age: 83
End: 2023-04-06

## 2023-04-06 ENCOUNTER — OUTPATIENT (OUTPATIENT)
Dept: OUTPATIENT SERVICES | Facility: HOSPITAL | Age: 83
LOS: 1 days | End: 2023-04-06
Payer: MEDICARE

## 2023-04-06 VITALS
SYSTOLIC BLOOD PRESSURE: 138 MMHG | DIASTOLIC BLOOD PRESSURE: 54 MMHG | WEIGHT: 117.07 LBS | TEMPERATURE: 98 F | OXYGEN SATURATION: 100 % | HEIGHT: 66 IN | RESPIRATION RATE: 12 BRPM | HEART RATE: 64 BPM

## 2023-04-06 DIAGNOSIS — T14.8 OTHER INJURY OF UNSPECIFIED BODY REGION: Chronic | ICD-10-CM

## 2023-04-06 DIAGNOSIS — C18.2 MALIGNANT NEOPLASM OF ASCENDING COLON: ICD-10-CM

## 2023-04-06 DIAGNOSIS — Z01.818 ENCOUNTER FOR OTHER PREPROCEDURAL EXAMINATION: ICD-10-CM

## 2023-04-06 DIAGNOSIS — Z98.49 CATARACT EXTRACTION STATUS, UNSPECIFIED EYE: Chronic | ICD-10-CM

## 2023-04-06 LAB
ALBUMIN SERPL ELPH-MCNC: 3.1 G/DL — LOW (ref 3.3–5)
ALLERGY+IMMUNOLOGY DIAG STUDY NOTE: SIGNIFICANT CHANGE UP
ALP SERPL-CCNC: 125 U/L — HIGH (ref 40–120)
ALT FLD-CCNC: 14 U/L — SIGNIFICANT CHANGE UP (ref 12–78)
ANION GAP SERPL CALC-SCNC: 3 MMOL/L — LOW (ref 5–17)
APTT BLD: 28.2 SEC — SIGNIFICANT CHANGE UP (ref 27.5–35.5)
AST SERPL-CCNC: 11 U/L — LOW (ref 15–37)
BASOPHILS # BLD AUTO: 0.05 K/UL — SIGNIFICANT CHANGE UP (ref 0–0.2)
BASOPHILS NFR BLD AUTO: 0.7 % — SIGNIFICANT CHANGE UP (ref 0–2)
BILIRUB DIRECT SERPL-MCNC: <0.1 MG/DL — SIGNIFICANT CHANGE UP (ref 0–0.3)
BILIRUB INDIRECT FLD-MCNC: >0.1 MG/DL — LOW (ref 0.2–1)
BILIRUB SERPL-MCNC: 0.2 MG/DL — SIGNIFICANT CHANGE UP (ref 0.2–1.2)
BLD GP AB SCN SERPL QL: SIGNIFICANT CHANGE UP
BUN SERPL-MCNC: 11 MG/DL — SIGNIFICANT CHANGE UP (ref 7–23)
CALCIUM SERPL-MCNC: 9 MG/DL — SIGNIFICANT CHANGE UP (ref 8.5–10.1)
CEA SERPL-MCNC: 4.2 NG/ML — HIGH (ref 0–3.8)
CHLORIDE SERPL-SCNC: 101 MMOL/L — SIGNIFICANT CHANGE UP (ref 96–108)
CO2 SERPL-SCNC: 28 MMOL/L — SIGNIFICANT CHANGE UP (ref 22–31)
CREAT SERPL-MCNC: 0.5 MG/DL — SIGNIFICANT CHANGE UP (ref 0.5–1.3)
DIR ANTIGLOB POLYSPECIFIC INTERPRETATION: SIGNIFICANT CHANGE UP
EGFR: 92 ML/MIN/1.73M2 — SIGNIFICANT CHANGE UP
EOSINOPHIL # BLD AUTO: 0.33 K/UL — SIGNIFICANT CHANGE UP (ref 0–0.5)
EOSINOPHIL NFR BLD AUTO: 4.3 % — SIGNIFICANT CHANGE UP (ref 0–6)
GLUCOSE SERPL-MCNC: 89 MG/DL — SIGNIFICANT CHANGE UP (ref 70–99)
HCT VFR BLD CALC: 33.3 % — LOW (ref 34.5–45)
HGB BLD-MCNC: 11 G/DL — LOW (ref 11.5–15.5)
IMM GRANULOCYTES NFR BLD AUTO: 0 % — SIGNIFICANT CHANGE UP (ref 0–0.9)
INR BLD: 1.16 RATIO — SIGNIFICANT CHANGE UP (ref 0.88–1.16)
LYMPHOCYTES # BLD AUTO: 1.78 K/UL — SIGNIFICANT CHANGE UP (ref 1–3.3)
LYMPHOCYTES # BLD AUTO: 23.4 % — SIGNIFICANT CHANGE UP (ref 13–44)
MCHC RBC-ENTMCNC: 32.9 PG — SIGNIFICANT CHANGE UP (ref 27–34)
MCHC RBC-ENTMCNC: 33 GM/DL — SIGNIFICANT CHANGE UP (ref 32–36)
MCV RBC AUTO: 99.7 FL — SIGNIFICANT CHANGE UP (ref 80–100)
MONOCYTES # BLD AUTO: 0.81 K/UL — SIGNIFICANT CHANGE UP (ref 0–0.9)
MONOCYTES NFR BLD AUTO: 10.6 % — SIGNIFICANT CHANGE UP (ref 2–14)
NEUTROPHILS # BLD AUTO: 4.63 K/UL — SIGNIFICANT CHANGE UP (ref 1.8–7.4)
NEUTROPHILS NFR BLD AUTO: 60.9 % — SIGNIFICANT CHANGE UP (ref 43–77)
PLATELET # BLD AUTO: 239 K/UL — SIGNIFICANT CHANGE UP (ref 150–400)
POTASSIUM SERPL-MCNC: 4.1 MMOL/L — SIGNIFICANT CHANGE UP (ref 3.5–5.3)
POTASSIUM SERPL-SCNC: 4.1 MMOL/L — SIGNIFICANT CHANGE UP (ref 3.5–5.3)
PROT SERPL-MCNC: 7.3 GM/DL — SIGNIFICANT CHANGE UP (ref 6–8.3)
PROTHROM AB SERPL-ACNC: 13.5 SEC — HIGH (ref 10.5–13.4)
RBC # BLD: 3.34 M/UL — LOW (ref 3.8–5.2)
RBC # FLD: 12.5 % — SIGNIFICANT CHANGE UP (ref 10.3–14.5)
SODIUM SERPL-SCNC: 132 MMOL/L — LOW (ref 135–145)
WBC # BLD: 7.61 K/UL — SIGNIFICANT CHANGE UP (ref 3.8–10.5)
WBC # FLD AUTO: 7.61 K/UL — SIGNIFICANT CHANGE UP (ref 3.8–10.5)

## 2023-04-06 PROCEDURE — 86850 RBC ANTIBODY SCREEN: CPT

## 2023-04-06 PROCEDURE — 86880 COOMBS TEST DIRECT: CPT

## 2023-04-06 PROCEDURE — 85730 THROMBOPLASTIN TIME PARTIAL: CPT

## 2023-04-06 PROCEDURE — 86900 BLOOD TYPING SEROLOGIC ABO: CPT

## 2023-04-06 PROCEDURE — 36415 COLL VENOUS BLD VENIPUNCTURE: CPT

## 2023-04-06 PROCEDURE — 80076 HEPATIC FUNCTION PANEL: CPT

## 2023-04-06 PROCEDURE — 83036 HEMOGLOBIN GLYCOSYLATED A1C: CPT

## 2023-04-06 PROCEDURE — 85025 COMPLETE CBC W/AUTO DIFF WBC: CPT

## 2023-04-06 PROCEDURE — 86077 PHYS BLOOD BANK SERV XMATCH: CPT

## 2023-04-06 PROCEDURE — 86901 BLOOD TYPING SEROLOGIC RH(D): CPT

## 2023-04-06 PROCEDURE — 71046 X-RAY EXAM CHEST 2 VIEWS: CPT | Mod: 26

## 2023-04-06 PROCEDURE — 80048 BASIC METABOLIC PNL TOTAL CA: CPT

## 2023-04-06 PROCEDURE — 85610 PROTHROMBIN TIME: CPT

## 2023-04-06 PROCEDURE — 82378 CARCINOEMBRYONIC ANTIGEN: CPT

## 2023-04-06 PROCEDURE — 71046 X-RAY EXAM CHEST 2 VIEWS: CPT

## 2023-04-06 PROCEDURE — 86870 RBC ANTIBODY IDENTIFICATION: CPT

## 2023-04-06 NOTE — H&P PST ADULT - HISTORY OF PRESENT ILLNESS
83 year old female recently diagnosed with colon cancer presents to PST for planned laparoscopic possible open right hemicolectomy, lymph node dissection, mobilization of splenic flexure on 04/13/2023 with Dr. Lopez.

## 2023-04-06 NOTE — H&P PST ADULT - NSICDXFAMILYHX_GEN_ALL_CORE_FT
FAMILY HISTORY:  Father  Still living? No  FH: heart attack, Age at diagnosis: Age Unknown    Grandparent  Still living? No  FH: type 2 diabetes, Age at diagnosis: Age Unknown

## 2023-04-06 NOTE — H&P PST ADULT - EKG AND INTERPRETATION
Performed 03/28, Dr. Aguillon's office, awaiting fax Performed 03/28/23, Dr. Aguillon's office, Sinus rhythm, on chart

## 2023-04-06 NOTE — H&P PST ADULT - ASSESSMENT
83 year old female recently diagnosed with colon cancer presents to PST for planned laparoscopic possible open right hemicolectomy, lymph node dissection, mobilization of splenic flexure on 04/13/2023 with Dr. Lopez.      Plan:  1. PST instructions given ; NPO status/  instructions to be given by ASU   2. Pt to be admitted 04/12/23 prior to surgery  3. Pt instructed to take routine evening medications unless indicated   4. Stop NSAIDS ( Aspirin Alev Motrin Mobic Diclofenac), herbal supplements , MVI , Vitamin fish oil 7 days prior to surgery  unless   directed by surgeon or cardiologist;   5. Medical Optimization  with Dr. Harp.  Spoke with Sierra to notify Dr. Harp regarding what patient reports is new "swelling" to left side of neck and ear "fullness"  6. EZ wash instructions given  7. Labs CXR as per surgeon request, EKG done 03/28/2023 with Dr. Aguillon, awaiting fax  8. Pt instructed to self quarantine after Covid test   9. Covid Testing scheduled Pt notified and aware, planned for 04/10/2023  10. Pt denies covid symptoms shortness of breath fever cough    83 year old female recently diagnosed with colon cancer presents to PST for planned laparoscopic possible open right hemicolectomy, lymph node dissection, mobilization of splenic flexure on 04/13/2023 with Dr. Lopez.      Plan:  1. PST instructions given ; NPO status/  instructions to be given by ASU   2. Pt to be admitted 04/12/23 prior to surgery  3. Pt instructed to take routine evening medications unless indicated   4. Stop NSAIDS ( Aspirin Alev Motrin Mobic Diclofenac), herbal supplements , MVI , Vitamin fish oil 7 days prior to surgery  unless   directed by surgeon or cardiologist;   5. Medical Optimization  with Dr. Harp.  Spoke with Sierra at Dr. Harp's office to notify Dr. Harp regarding what patient reports is new "swelling" to left side of neck and ear "fullness" simce 03/29/23  6. EZ wash instructions given  7. Labs CXR as per surgeon request, EKG done 03/28/2023 with Dr. Aguillon, on chart  8. Pt instructed to self quarantine after Covid test   9. Covid Testing scheduled Pt aware, planned for 04/10/2023  10. Pt denies covid symptoms shortness of breath fever cough   11. Patient will follow up with PCP and or Surgeon regarding bowel prep prior to surgery.

## 2023-04-06 NOTE — H&P PST ADULT - MUSCULOSKELETAL COMMENTS
severe left hip and left leg pain, states needs left hip replacement surgery, utilizing wheelchair and cane seated in wheelchair, requires assist to stand

## 2023-04-06 NOTE — H&P PST ADULT - RESPIRATORY RATE (BREATHS/MIN)
12 Doxycycline Pregnancy And Lactation Text: This medication is Pregnancy Category D and not consider safe during pregnancy. It is also excreted in breast milk but is considered safe for shorter treatment courses.

## 2023-04-06 NOTE — H&P PST ADULT - ENMT COMMENTS
slight ear "fullness" left side, improving.  New "swelling" to left side of neck, patient states noticed 03/29/23
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
## 2023-04-07 DIAGNOSIS — C18.2 MALIGNANT NEOPLASM OF ASCENDING COLON: ICD-10-CM

## 2023-04-07 DIAGNOSIS — Z01.818 ENCOUNTER FOR OTHER PREPROCEDURAL EXAMINATION: ICD-10-CM

## 2023-04-07 LAB
A1C WITH ESTIMATED AVERAGE GLUCOSE RESULT: 5.4 % — SIGNIFICANT CHANGE UP (ref 4–5.6)
ESTIMATED AVERAGE GLUCOSE: 108 MG/DL — SIGNIFICANT CHANGE UP (ref 68–114)

## 2023-04-07 NOTE — CHART NOTE - NSCHARTNOTEFT_GEN_A_CORE
Patient with abnormal antibodies from type and screen. Blood bank wants three additional pink top tubes drawn prior to surgery. Patient is dependent on others for mobility. She is being admitted to [facility] on 4/11/2023 between 10am and 11 am. Blood bank approved pink top tubes on admission (approved by manager, ashley to Junior). DARINEL North , Bernie made aware. Message left for Ye Beltrán, awaiting call back. Patient also made aware about need for the pink tos on the day of surgery.

## 2023-04-09 RX ORDER — SOD SULF/SODIUM/NAHCO3/KCL/PEG
2000 SOLUTION, RECONSTITUTED, ORAL ORAL ONCE
Refills: 0 | Status: COMPLETED | OUTPATIENT
Start: 2023-04-11 | End: 2023-04-11

## 2023-04-09 RX ORDER — ALVIMOPAN 12 MG/1
12 CAPSULE ORAL ONCE
Refills: 0 | Status: DISCONTINUED | OUTPATIENT
Start: 2023-04-12 | End: 2023-04-12

## 2023-04-09 RX ORDER — METRONIDAZOLE 500 MG
1000 TABLET ORAL
Refills: 0 | Status: COMPLETED | OUTPATIENT
Start: 2023-04-11 | End: 2023-04-11

## 2023-04-09 RX ORDER — SODIUM CHLORIDE 9 MG/ML
1000 INJECTION, SOLUTION INTRAVENOUS
Refills: 0 | Status: DISCONTINUED | OUTPATIENT
Start: 2023-04-11 | End: 2023-04-14

## 2023-04-09 RX ORDER — ACETAMINOPHEN 500 MG
650 TABLET ORAL EVERY 6 HOURS
Refills: 0 | Status: DISCONTINUED | OUTPATIENT
Start: 2023-04-11 | End: 2023-04-21

## 2023-04-09 RX ORDER — HEPARIN SODIUM 5000 [USP'U]/ML
5000 INJECTION INTRAVENOUS; SUBCUTANEOUS EVERY 12 HOURS
Refills: 0 | Status: DISCONTINUED | OUTPATIENT
Start: 2023-04-11 | End: 2023-04-21

## 2023-04-09 RX ORDER — ERYTHROMYCIN ETHYLSUCCINATE 400 MG
1000 TABLET ORAL
Refills: 0 | Status: COMPLETED | OUTPATIENT
Start: 2023-04-11 | End: 2023-04-11

## 2023-04-09 RX ORDER — ONDANSETRON 8 MG/1
4 TABLET, FILM COATED ORAL EVERY 6 HOURS
Refills: 0 | Status: DISCONTINUED | OUTPATIENT
Start: 2023-04-11 | End: 2023-04-21

## 2023-04-09 NOTE — CHART NOTE - NSCHARTNOTEFT_GEN_A_CORE
I have spoken to bloodbank today, They want a total of 3 tubes for type and screen, It has been ordered.

## 2023-04-10 PROBLEM — R01.1 CARDIAC MURMUR, UNSPECIFIED: Chronic | Status: ACTIVE | Noted: 2023-04-06

## 2023-04-10 PROBLEM — M19.90 UNSPECIFIED OSTEOARTHRITIS, UNSPECIFIED SITE: Chronic | Status: ACTIVE | Noted: 2023-04-06

## 2023-04-10 PROBLEM — M25.552 PAIN IN LEFT HIP: Chronic | Status: ACTIVE | Noted: 2023-04-06

## 2023-04-10 PROBLEM — C18.2 MALIGNANT NEOPLASM OF ASCENDING COLON: Chronic | Status: ACTIVE | Noted: 2023-04-06

## 2023-04-11 ENCOUNTER — INPATIENT (INPATIENT)
Facility: HOSPITAL | Age: 83
LOS: 9 days | Discharge: NURSING, REHAB OR LONG-TERM CARE | DRG: 329 | End: 2023-04-21
Attending: COLON & RECTAL SURGERY | Admitting: COLON & RECTAL SURGERY
Payer: MEDICARE

## 2023-04-11 ENCOUNTER — FORM ENCOUNTER (OUTPATIENT)
Age: 83
End: 2023-04-11

## 2023-04-11 VITALS
HEART RATE: 66 BPM | SYSTOLIC BLOOD PRESSURE: 138 MMHG | OXYGEN SATURATION: 99 % | DIASTOLIC BLOOD PRESSURE: 62 MMHG | TEMPERATURE: 98 F | RESPIRATION RATE: 16 BRPM | HEIGHT: 66 IN | WEIGHT: 116.62 LBS

## 2023-04-11 DIAGNOSIS — Z98.49 CATARACT EXTRACTION STATUS, UNSPECIFIED EYE: Chronic | ICD-10-CM

## 2023-04-11 DIAGNOSIS — C18.2 MALIGNANT NEOPLASM OF ASCENDING COLON: ICD-10-CM

## 2023-04-11 DIAGNOSIS — T14.8 OTHER INJURY OF UNSPECIFIED BODY REGION: Chronic | ICD-10-CM

## 2023-04-11 LAB
BASOPHILS # BLD AUTO: 0.05 K/UL — SIGNIFICANT CHANGE UP (ref 0–0.2)
BASOPHILS NFR BLD AUTO: 0.7 % — SIGNIFICANT CHANGE UP (ref 0–2)
EOSINOPHIL # BLD AUTO: 0.27 K/UL — SIGNIFICANT CHANGE UP (ref 0–0.5)
EOSINOPHIL NFR BLD AUTO: 3.6 % — SIGNIFICANT CHANGE UP (ref 0–6)
HCT VFR BLD CALC: 32.8 % — LOW (ref 34.5–45)
HGB BLD-MCNC: 11.1 G/DL — LOW (ref 11.5–15.5)
IMM GRANULOCYTES NFR BLD AUTO: 0.4 % — SIGNIFICANT CHANGE UP (ref 0–0.9)
LYMPHOCYTES # BLD AUTO: 1.73 K/UL — SIGNIFICANT CHANGE UP (ref 1–3.3)
LYMPHOCYTES # BLD AUTO: 22.9 % — SIGNIFICANT CHANGE UP (ref 13–44)
MCHC RBC-ENTMCNC: 33.8 GM/DL — SIGNIFICANT CHANGE UP (ref 32–36)
MCHC RBC-ENTMCNC: 33.8 PG — SIGNIFICANT CHANGE UP (ref 27–34)
MCV RBC AUTO: 100 FL — SIGNIFICANT CHANGE UP (ref 80–100)
MONOCYTES # BLD AUTO: 0.74 K/UL — SIGNIFICANT CHANGE UP (ref 0–0.9)
MONOCYTES NFR BLD AUTO: 9.8 % — SIGNIFICANT CHANGE UP (ref 2–14)
NEUTROPHILS # BLD AUTO: 4.74 K/UL — SIGNIFICANT CHANGE UP (ref 1.8–7.4)
NEUTROPHILS NFR BLD AUTO: 62.6 % — SIGNIFICANT CHANGE UP (ref 43–77)
PLATELET # BLD AUTO: 242 K/UL — SIGNIFICANT CHANGE UP (ref 150–400)
RBC # BLD: 3.28 M/UL — LOW (ref 3.8–5.2)
RBC # FLD: 12.6 % — SIGNIFICANT CHANGE UP (ref 10.3–14.5)
SARS-COV-2 RNA SPEC QL NAA+PROBE: SIGNIFICANT CHANGE UP
WBC # BLD: 7.56 K/UL — SIGNIFICANT CHANGE UP (ref 3.8–10.5)
WBC # FLD AUTO: 7.56 K/UL — SIGNIFICANT CHANGE UP (ref 3.8–10.5)

## 2023-04-11 PROCEDURE — 83735 ASSAY OF MAGNESIUM: CPT

## 2023-04-11 PROCEDURE — 80053 COMPREHEN METABOLIC PANEL: CPT

## 2023-04-11 PROCEDURE — U0005: CPT

## 2023-04-11 PROCEDURE — 0001U RBC DNA HEA 35 AG 11 BLD GRP: CPT

## 2023-04-11 PROCEDURE — 86920 COMPATIBILITY TEST SPIN: CPT

## 2023-04-11 PROCEDURE — 80076 HEPATIC FUNCTION PANEL: CPT

## 2023-04-11 PROCEDURE — 84478 ASSAY OF TRIGLYCERIDES: CPT

## 2023-04-11 PROCEDURE — 87635 SARS-COV-2 COVID-19 AMP PRB: CPT

## 2023-04-11 PROCEDURE — 86901 BLOOD TYPING SEROLOGIC RH(D): CPT

## 2023-04-11 PROCEDURE — 93005 ELECTROCARDIOGRAM TRACING: CPT

## 2023-04-11 PROCEDURE — C9113: CPT

## 2023-04-11 PROCEDURE — 82962 GLUCOSE BLOOD TEST: CPT

## 2023-04-11 PROCEDURE — 86850 RBC ANTIBODY SCREEN: CPT

## 2023-04-11 PROCEDURE — 85025 COMPLETE CBC W/AUTO DIFF WBC: CPT

## 2023-04-11 PROCEDURE — 86880 COOMBS TEST DIRECT: CPT

## 2023-04-11 PROCEDURE — C9290: CPT

## 2023-04-11 PROCEDURE — C1765: CPT

## 2023-04-11 PROCEDURE — 99222 1ST HOSP IP/OBS MODERATE 55: CPT

## 2023-04-11 PROCEDURE — 88309 TISSUE EXAM BY PATHOLOGIST: CPT

## 2023-04-11 PROCEDURE — 86922 COMPATIBILITY TEST ANTIGLOB: CPT

## 2023-04-11 PROCEDURE — 93970 EXTREMITY STUDY: CPT

## 2023-04-11 PROCEDURE — C9399: CPT

## 2023-04-11 PROCEDURE — 85610 PROTHROMBIN TIME: CPT

## 2023-04-11 PROCEDURE — 97116 GAIT TRAINING THERAPY: CPT | Mod: GP

## 2023-04-11 PROCEDURE — 36415 COLL VENOUS BLD VENIPUNCTURE: CPT

## 2023-04-11 PROCEDURE — 84100 ASSAY OF PHOSPHORUS: CPT

## 2023-04-11 PROCEDURE — 86900 BLOOD TYPING SEROLOGIC ABO: CPT

## 2023-04-11 PROCEDURE — 85730 THROMBOPLASTIN TIME PARTIAL: CPT

## 2023-04-11 PROCEDURE — 74018 RADEX ABDOMEN 1 VIEW: CPT

## 2023-04-11 PROCEDURE — 86921 COMPATIBILITY TEST INCUBATE: CPT

## 2023-04-11 PROCEDURE — C1889: CPT

## 2023-04-11 PROCEDURE — 85027 COMPLETE CBC AUTOMATED: CPT

## 2023-04-11 PROCEDURE — 80048 BASIC METABOLIC PNL TOTAL CA: CPT

## 2023-04-11 PROCEDURE — U0003: CPT

## 2023-04-11 PROCEDURE — 97530 THERAPEUTIC ACTIVITIES: CPT | Mod: GP

## 2023-04-11 RX ORDER — MULTIVIT-MIN/FERROUS GLUCONATE 9 MG/15 ML
0 LIQUID (ML) ORAL
Refills: 0 | DISCHARGE

## 2023-04-11 RX ORDER — UBIDECARENONE 100 MG
1 CAPSULE ORAL
Refills: 0 | DISCHARGE

## 2023-04-11 RX ORDER — GLUCOSAMINE HCL/CHONDROITIN SU 500-400 MG
2 CAPSULE ORAL
Refills: 0 | DISCHARGE

## 2023-04-11 RX ORDER — LEVOTHYROXINE SODIUM 125 MCG
100 TABLET ORAL DAILY
Refills: 0 | Status: DISCONTINUED | OUTPATIENT
Start: 2023-04-11 | End: 2023-04-13

## 2023-04-11 RX ORDER — CHOLECALCIFEROL (VITAMIN D3) 125 MCG
1 CAPSULE ORAL
Refills: 0 | DISCHARGE

## 2023-04-11 RX ORDER — MILK THISTLE 150 MG
1 CAPSULE ORAL
Refills: 0 | DISCHARGE

## 2023-04-11 RX ORDER — LEVOTHYROXINE SODIUM 125 MCG
1 TABLET ORAL
Refills: 0 | DISCHARGE

## 2023-04-11 RX ADMIN — Medication 1000 MILLIGRAM(S): at 22:06

## 2023-04-11 RX ADMIN — Medication 1000 MILLIGRAM(S): at 13:17

## 2023-04-11 RX ADMIN — Medication 1000 MILLIGRAM(S): at 17:18

## 2023-04-11 RX ADMIN — SODIUM CHLORIDE 50 MILLILITER(S): 9 INJECTION, SOLUTION INTRAVENOUS at 11:23

## 2023-04-11 RX ADMIN — Medication 1000 MILLIGRAM(S): at 22:05

## 2023-04-11 RX ADMIN — Medication 650 MILLIGRAM(S): at 14:49

## 2023-04-11 RX ADMIN — Medication 50 MILLIGRAM(S): at 18:40

## 2023-04-11 RX ADMIN — Medication 2000 MILLILITER(S): at 16:21

## 2023-04-11 RX ADMIN — HEPARIN SODIUM 5000 UNIT(S): 5000 INJECTION INTRAVENOUS; SUBCUTANEOUS at 22:05

## 2023-04-11 RX ADMIN — Medication 1000 MILLIGRAM(S): at 17:19

## 2023-04-11 RX ADMIN — Medication 100 MILLIGRAM(S): at 11:57

## 2023-04-11 NOTE — ASSESSMENT
[Patient Requires Further Testing] : Patient requires further testing [FreeTextEntry4] : Pending preop labs\par was seen by cardio and plan for pharm nuclear stress test to further risk stratify prior to surgery

## 2023-04-11 NOTE — PROGRESS NOTE ADULT - SUBJECTIVE AND OBJECTIVE BOX
Pt seen at bedside, no complains  No nausea, vomiting, fever or chills    Vitals:  T(C): 36.5 (04-11 @ 15:17), Max: 36.6 (04-11 @ 10:57)  HR: 61 (04-11 @ 15:17) (61 - 66)  BP: 125/64 (04-11 @ 15:17) (125/64 - 138/62)  RR: 18 (04-11 @ 15:17) (16 - 18)  SpO2: 99% (04-11 @ 15:17) (99% - 99%)      Physical Exam:  General: AAOx3, Well developed, NAD  Chest: Normal respiratory effort  Heart: RRR  Abdomen: Soft, NTND, no masses  Neuro/Psych: No localized deficits. Normal spech, normal tone  Skin: Normal, no rashes, no lesions noted.   Extremities: Warm, well perfused, no edema, Pulses intact    04-11 @ 11:33                    11.1  CBC: 7.56>)-------(<242                     32.8                 - | - | -    CMP:  ----------------------< -               - | - | -                      Ca:-  Phos:-  Mg:-               -|      |-        LFTs:  ------|-|-----             -|      |-      Current Inpatient Medications:  acetaminophen     Tablet .. 650 milliGRAM(s) Oral every 6 hours PRN  erythromycin     enteric coated 1000 milliGRAM(s) Oral <User Schedule>  heparin   Injectable 5000 Unit(s) SubCutaneous every 12 hours  lactated ringers. 1000 milliLiter(s) (50 mL/Hr) IV Continuous <Continuous>  levothyroxine 100 MICROGram(s) Oral daily  metroNIDAZOLE    Tablet 1000 milliGRAM(s) Oral <User Schedule>  multivitamin 1 Tablet(s) Oral daily  ondansetron Injectable 4 milliGRAM(s) IV Push every 6 hours PRN  phenytoin   Capsule 100 milliGRAM(s) Oral daily  phenytoin   Chewable 50 milliGRAM(s) Chew <User Schedule>

## 2023-04-11 NOTE — HISTORY OF PRESENT ILLNESS
[No Adverse Anesthesia Reaction] : no adverse anesthesia reaction in self or family member [Aortic Stenosis] : no aortic stenosis [Atrial Fibrillation] : no atrial fibrillation [Coronary Artery Disease] : no coronary artery disease [Recent Myocardial Infarction] : no recent myocardial infarction [Implantable Device/Pacemaker] : no implantable device/pacemaker [Asthma] : no asthma [COPD] : no COPD [Sleep Apnea] : no sleep apnea [Smoker] : not a smoker [Chronic Anticoagulation] : no chronic anticoagulation [Chronic Kidney Disease] : no chronic kidney disease [Diabetes] : no diabetes [FreeTextEntry1] : right hemicolectomy [FreeTextEntry2] : TBD [FreeTextEntry4] : 82 y/o female with pmhx of anemia, seizure disorder, arthritis, anxiety, hypothyroidism, presents for preop optimization. \par \par In January 2023, she had a CT A/P performed for reports of abdominal discomfort. This revealed irregular mural thickening of the ascending colon to the level of the hepatic flexure with associated mesenteric lymphadenopathy. 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 \par \par Patient is planning to under go laparoscopic right hemicolectomy. She has not yet determined a date for the procedure or for presurgical testing. She will be seeing cardiology today for cardiac optimization [FreeTextEntry6] : Denies any chest pain, palpations, or shortness of breath. Unable to walk long distances due to hip pain
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
## 2023-04-11 NOTE — ADDENDUM
[FreeTextEntry1] : 4/11/23: Patient seen by cardiology and had pharmacological nuclear stress test which was negative for ischemia.  Per cardiology CV stable at low moderate risk for surgery and may proceed as scheduled.

## 2023-04-11 NOTE — PHYSICAL THERAPY INITIAL EVALUATION ADULT - RANGE OF MOTION, PT EVAL
STG: Performs AROM exs BLEs in bed/chair to combat/discourage joint stiffness eusebia. L hip (3-4 days)

## 2023-04-11 NOTE — PATIENT PROFILE ADULT - FALL HARM RISK - HARM RISK INTERVENTIONS

## 2023-04-11 NOTE — PHYSICAL THERAPY INITIAL EVALUATION ADULT - GAIT TRAINING, PT EVAL
LTG: amb with least restrictive assistive device >500ft community distances with rests as needed (7-10 days)
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
## 2023-04-11 NOTE — PHYSICAL EXAM
[Regular Rhythm] : with a regular rhythm [Normal Rate] : normal rate  [Normal S1, S2] : normal S1 and S2 [No Edema] : there was no peripheral edema [Soft] : abdomen soft [Non-distended] : non-distended [No Masses] : no abdominal mass palpated [Coordination Grossly Intact] : coordination grossly intact [No Focal Deficits] : no focal deficits [Normal Gait] : normal gait [Normal] : affect was normal and insight and judgment were intact [de-identified] : systolic murmur LUSB [de-identified] : abdominal incision well healed, mild tenderness to palpation of LLQ

## 2023-04-11 NOTE — PROGRESS NOTE ADULT - ASSESSMENT
83 year old female recently diagnosed with colon cancer for planned laparoscopic possible open right hemicolectomy, lymph node dissection, mobilization of splenic flexure on 04/12/2023 with Dr. Lopez.    Plan:    NPO after MN  pre op labs  bowel prep  IVF  home meds    Case discussed with DR. Lopez

## 2023-04-11 NOTE — PHYSICAL THERAPY INITIAL EVALUATION ADULT - PATIENT/FAMILY/SIGNIFICANT OTHER GOALS STATEMENT, PT EVAL
pt c/o stiffnesss L hip after sitting up in chair ; hopes to have L hip replaced in ear future after recovered from colon surgery

## 2023-04-11 NOTE — PATIENT PROFILE ADULT - VISION (WITH CORRECTIVE LENSES IF THE PATIENT USUALLY WEARS THEM):
left eye blindness/Severely impaired: cannot locate objects without hearing or touching them or patient nonresponsive.

## 2023-04-12 ENCOUNTER — NON-APPOINTMENT (OUTPATIENT)
Age: 83
End: 2023-04-12

## 2023-04-12 ENCOUNTER — APPOINTMENT (OUTPATIENT)
Dept: COLORECTAL SURGERY | Facility: HOSPITAL | Age: 83
End: 2023-04-12

## 2023-04-12 ENCOUNTER — RESULT REVIEW (OUTPATIENT)
Age: 83
End: 2023-04-12

## 2023-04-12 ENCOUNTER — TRANSCRIPTION ENCOUNTER (OUTPATIENT)
Age: 83
End: 2023-04-12

## 2023-04-12 LAB
ANION GAP SERPL CALC-SCNC: 3 MMOL/L — LOW (ref 5–17)
APTT BLD: 27.4 SEC — LOW (ref 27.5–35.5)
BLD GP AB SCN SERPL QL: SIGNIFICANT CHANGE UP
BUN SERPL-MCNC: 9 MG/DL — SIGNIFICANT CHANGE UP (ref 7–23)
CALCIUM SERPL-MCNC: 8.9 MG/DL — SIGNIFICANT CHANGE UP (ref 8.5–10.1)
CHLORIDE SERPL-SCNC: 109 MMOL/L — HIGH (ref 96–108)
CO2 SERPL-SCNC: 25 MMOL/L — SIGNIFICANT CHANGE UP (ref 22–31)
CREAT SERPL-MCNC: 0.54 MG/DL — SIGNIFICANT CHANGE UP (ref 0.5–1.3)
DIR ANTIGLOB POLYSPECIFIC INTERPRETATION: SIGNIFICANT CHANGE UP
EGFR: 91 ML/MIN/1.73M2 — SIGNIFICANT CHANGE UP
GLUCOSE BLDC GLUCOMTR-MCNC: 108 MG/DL — HIGH (ref 70–99)
GLUCOSE BLDC GLUCOMTR-MCNC: 141 MG/DL — HIGH (ref 70–99)
GLUCOSE BLDC GLUCOMTR-MCNC: 149 MG/DL — HIGH (ref 70–99)
GLUCOSE SERPL-MCNC: 110 MG/DL — HIGH (ref 70–99)
INR BLD: 1.2 RATIO — HIGH (ref 0.88–1.16)
POTASSIUM SERPL-MCNC: 4.2 MMOL/L — SIGNIFICANT CHANGE UP (ref 3.5–5.3)
POTASSIUM SERPL-SCNC: 4.2 MMOL/L — SIGNIFICANT CHANGE UP (ref 3.5–5.3)
PROTHROM AB SERPL-ACNC: 13.9 SEC — HIGH (ref 10.5–13.4)
SODIUM SERPL-SCNC: 137 MMOL/L — SIGNIFICANT CHANGE UP (ref 135–145)

## 2023-04-12 PROCEDURE — 99232 SBSQ HOSP IP/OBS MODERATE 35: CPT

## 2023-04-12 PROCEDURE — 44120 REMOVAL OF SMALL INTESTINE: CPT

## 2023-04-12 PROCEDURE — 88309 TISSUE EXAM BY PATHOLOGIST: CPT | Mod: 26

## 2023-04-12 PROCEDURE — 38570 LAPAROSCOPY LYMPH NODE BIOP: CPT | Mod: AS

## 2023-04-12 PROCEDURE — 49205: CPT | Mod: AS

## 2023-04-12 PROCEDURE — 93010 ELECTROCARDIOGRAM REPORT: CPT

## 2023-04-12 PROCEDURE — 38570 LAPAROSCOPY LYMPH NODE BIOP: CPT

## 2023-04-12 PROCEDURE — 44140 PARTIAL REMOVAL OF COLON: CPT

## 2023-04-12 PROCEDURE — 44120 REMOVAL OF SMALL INTESTINE: CPT | Mod: AS

## 2023-04-12 PROCEDURE — 49205: CPT

## 2023-04-12 PROCEDURE — 44140 PARTIAL REMOVAL OF COLON: CPT | Mod: AS

## 2023-04-12 RX ORDER — CEFOTETAN DISODIUM 1 G
2 VIAL (EA) INJECTION ONCE
Refills: 0 | Status: COMPLETED | OUTPATIENT
Start: 2023-04-13 | End: 2023-04-13

## 2023-04-12 RX ORDER — ACETAMINOPHEN 500 MG
1000 TABLET ORAL EVERY 6 HOURS
Refills: 0 | Status: COMPLETED | OUTPATIENT
Start: 2023-04-12 | End: 2023-04-13

## 2023-04-12 RX ORDER — ONDANSETRON 8 MG/1
4 TABLET, FILM COATED ORAL ONCE
Refills: 0 | Status: DISCONTINUED | OUTPATIENT
Start: 2023-04-12 | End: 2023-04-12

## 2023-04-12 RX ORDER — FENTANYL CITRATE 50 UG/ML
50 INJECTION INTRAVENOUS
Refills: 0 | Status: DISCONTINUED | OUTPATIENT
Start: 2023-04-12 | End: 2023-04-12

## 2023-04-12 RX ORDER — MORPHINE SULFATE 50 MG/1
2 CAPSULE, EXTENDED RELEASE ORAL EVERY 4 HOURS
Refills: 0 | Status: DISCONTINUED | OUTPATIENT
Start: 2023-04-12 | End: 2023-04-13

## 2023-04-12 RX ORDER — ALVIMOPAN 12 MG/1
12 CAPSULE ORAL
Refills: 0 | Status: COMPLETED | OUTPATIENT
Start: 2023-04-12 | End: 2023-04-15

## 2023-04-12 RX ORDER — HYDROMORPHONE HYDROCHLORIDE 2 MG/ML
0.5 INJECTION INTRAMUSCULAR; INTRAVENOUS; SUBCUTANEOUS
Refills: 0 | Status: DISCONTINUED | OUTPATIENT
Start: 2023-04-12 | End: 2023-04-12

## 2023-04-12 RX ORDER — SODIUM CHLORIDE 9 MG/ML
1000 INJECTION, SOLUTION INTRAVENOUS
Refills: 0 | Status: DISCONTINUED | OUTPATIENT
Start: 2023-04-12 | End: 2023-04-12

## 2023-04-12 RX ORDER — OXYCODONE HYDROCHLORIDE 5 MG/1
5 TABLET ORAL ONCE
Refills: 0 | Status: DISCONTINUED | OUTPATIENT
Start: 2023-04-12 | End: 2023-04-12

## 2023-04-12 RX ORDER — ACETAMINOPHEN 500 MG
1000 TABLET ORAL ONCE
Refills: 0 | Status: COMPLETED | OUTPATIENT
Start: 2023-04-12 | End: 2023-04-14

## 2023-04-12 RX ADMIN — FENTANYL CITRATE 50 MICROGRAM(S): 50 INJECTION INTRAVENOUS at 14:57

## 2023-04-12 RX ADMIN — Medication 400 MILLIGRAM(S): at 22:18

## 2023-04-12 RX ADMIN — FENTANYL CITRATE 50 MICROGRAM(S): 50 INJECTION INTRAVENOUS at 17:20

## 2023-04-12 RX ADMIN — ALVIMOPAN 12 MILLIGRAM(S): 12 CAPSULE ORAL at 23:56

## 2023-04-12 RX ADMIN — FENTANYL CITRATE 50 MICROGRAM(S): 50 INJECTION INTRAVENOUS at 17:00

## 2023-04-12 RX ADMIN — Medication 100 MICROGRAM(S): at 05:40

## 2023-04-12 RX ADMIN — SODIUM CHLORIDE 50 MILLILITER(S): 9 INJECTION, SOLUTION INTRAVENOUS at 05:38

## 2023-04-12 RX ADMIN — FENTANYL CITRATE 50 MICROGRAM(S): 50 INJECTION INTRAVENOUS at 15:35

## 2023-04-12 RX ADMIN — SODIUM CHLORIDE 125 MILLILITER(S): 9 INJECTION, SOLUTION INTRAVENOUS at 21:35

## 2023-04-12 RX ADMIN — HEPARIN SODIUM 5000 UNIT(S): 5000 INJECTION INTRAVENOUS; SUBCUTANEOUS at 22:19

## 2023-04-12 RX ADMIN — FENTANYL CITRATE 50 MICROGRAM(S): 50 INJECTION INTRAVENOUS at 15:20

## 2023-04-12 RX ADMIN — Medication 1000 MILLIGRAM(S): at 22:30

## 2023-04-12 RX ADMIN — HYDROMORPHONE HYDROCHLORIDE 0.5 MILLIGRAM(S): 2 INJECTION INTRAMUSCULAR; INTRAVENOUS; SUBCUTANEOUS at 17:20

## 2023-04-12 RX ADMIN — HYDROMORPHONE HYDROCHLORIDE 0.5 MILLIGRAM(S): 2 INJECTION INTRAMUSCULAR; INTRAVENOUS; SUBCUTANEOUS at 18:54

## 2023-04-12 RX ADMIN — HYDROMORPHONE HYDROCHLORIDE 0.5 MILLIGRAM(S): 2 INJECTION INTRAMUSCULAR; INTRAVENOUS; SUBCUTANEOUS at 17:35

## 2023-04-12 RX ADMIN — Medication 50 MILLIGRAM(S): at 18:50

## 2023-04-12 NOTE — BRIEF OPERATIVE NOTE - OPERATION/FINDINGS
lap to openm extended right hemicolectomy, with SBR, lymphadenectomy, extensive GUILLERMO  very large, invasive tumor invading SB and mesentery, palpable lymph nodes, ~100 cm SB resected with specimen, residual SB left ~150cm

## 2023-04-12 NOTE — PROGRESS NOTE ADULT - ASSESSMENT
83 year old female recently diagnosed with colon cancer for planned laparoscopic possible open right hemicolectomy, lymph node dissection, mobilization of splenic flexure on 04/12/2023 with Dr. Lopez.    Plan:    NPO   Colon Bundle  Entereg  pre op labs  bowel prep completed  IVF  home meds    Case discussed with CRS team

## 2023-04-12 NOTE — BRIEF OPERATIVE NOTE - NSICDXBRIEFPROCEDURE_GEN_ALL_CORE_FT
PROCEDURES:  Extended right hemicolectomy and anastomosis of ileum to colon 12-Apr-2023 14:34:43  Christine Ashley  Open lysis of intestinal adhesions 12-Apr-2023 14:35:03  Christine Ashley  Diagnostic laparoscopy with laparotomy 12-Apr-2023 14:35:42  Christine Ashley
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
## 2023-04-12 NOTE — PROGRESS NOTE ADULT - SUBJECTIVE AND OBJECTIVE BOX
CC: Pleasant 83 yr old female diagnosed with Colon Ca here for Rt Hemicolectomy.  Pt denies ha lightheadedness cp palps sob abdo pain tinglign or numbness.  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    4/12 - pt seen in AM with family at bedside - no cp palps sob abdo pain. no acute overnight events     Vital Signs Last 24 Hrs  T(C): 36.8 (12 Apr 2023 14:30), Max: 36.8 (12 Apr 2023 14:30)  T(F): 98.2 (12 Apr 2023 14:30), Max: 98.2 (12 Apr 2023 14:30)  HR: 71 (12 Apr 2023 19:00) (61 - 74)  BP: 141/53 (12 Apr 2023 19:00) (117/44 - 159/65)  RR: 20 (12 Apr 2023 19:00) (11 - 20)  SpO2: 100% (12 Apr 2023 19:00) (92% - 100%)/RA   Constitutional: NAD, awake and alert, sitting up, conversant   HEENT: PERR, EOMI  Neck: Soft and supple,  No JVD  Respiratory: Breath sounds are clear bilaterally, No wheezing, rales or rhonchi  Cardiovascular: S1 and S2, regular rate and rhythm, no Murmurs  Gastrointestinal: Bowel Sounds present, soft, nontender, nondistended  Extremities: No peripheral edema  Vascular: 2+ peripheral pulses  Neurological: A/O x 3, no focal deficits  Musculoskeletal: 5/5 strength b/l upper and lower extremities  Skin: No rashes    MEDICATIONS:  MEDICATIONS  (STANDING):  acetaminophen   IVPB .. 1000 milliGRAM(s) IV Intermittent every 6 hours  alvimopan 12 milliGRAM(s) Oral two times a day  heparin   Injectable 5000 Unit(s) SubCutaneous every 12 hours  lactated ringers. 1000 milliLiter(s) (100 mL/Hr) IV Continuous <Continuous>  lactated ringers. 1000 milliLiter(s) (125 mL/Hr) IV Continuous <Continuous>  levothyroxine 100 MICROGram(s) Oral daily  multivitamin 1 Tablet(s) Oral daily  phenytoin   Capsule 100 milliGRAM(s) Oral daily  phenytoin   Chewable 50 milliGRAM(s) Chew <User Schedule>      LABS: All Labs Reviewed:                      11.1   7.56  )-----------( 242      ( 11 Apr 2023 11:33 )             32.8  137  |  109<H>  |  9   ----------------------------<  110<H>  4.2   |  25  |  0.54  Ca    8.9      12 Apr 2023 07:49  PT/INR - ( 12 Apr 2023 07:49 )   PT: 13.9 sec;   INR: 1.20 ratio      RADIOLOGY/EKG:  < from: Xray Chest 2 Views PA/Lat (04.06.23 @ 16:41) >  IMPRESSION:   No radiographic evidence of active chest disease.

## 2023-04-13 LAB
ANION GAP SERPL CALC-SCNC: 6 MMOL/L — SIGNIFICANT CHANGE UP (ref 5–17)
BASOPHILS # BLD AUTO: 0.02 K/UL — SIGNIFICANT CHANGE UP (ref 0–0.2)
BASOPHILS NFR BLD AUTO: 0.3 % — SIGNIFICANT CHANGE UP (ref 0–2)
BUN SERPL-MCNC: 9 MG/DL — SIGNIFICANT CHANGE UP (ref 7–23)
CALCIUM SERPL-MCNC: 8.4 MG/DL — LOW (ref 8.5–10.1)
CHLORIDE SERPL-SCNC: 107 MMOL/L — SIGNIFICANT CHANGE UP (ref 96–108)
CO2 SERPL-SCNC: 23 MMOL/L — SIGNIFICANT CHANGE UP (ref 22–31)
CREAT SERPL-MCNC: 0.73 MG/DL — SIGNIFICANT CHANGE UP (ref 0.5–1.3)
EGFR: 81 ML/MIN/1.73M2 — SIGNIFICANT CHANGE UP
EOSINOPHIL # BLD AUTO: 0.01 K/UL — SIGNIFICANT CHANGE UP (ref 0–0.5)
EOSINOPHIL NFR BLD AUTO: 0.2 % — SIGNIFICANT CHANGE UP (ref 0–6)
GLUCOSE BLDC GLUCOMTR-MCNC: 111 MG/DL — HIGH (ref 70–99)
GLUCOSE BLDC GLUCOMTR-MCNC: 129 MG/DL — HIGH (ref 70–99)
GLUCOSE SERPL-MCNC: 130 MG/DL — HIGH (ref 70–99)
HCT VFR BLD CALC: 33 % — LOW (ref 34.5–45)
HGB BLD-MCNC: 11.3 G/DL — LOW (ref 11.5–15.5)
IMM GRANULOCYTES NFR BLD AUTO: 0.3 % — SIGNIFICANT CHANGE UP (ref 0–0.9)
LYMPHOCYTES # BLD AUTO: 1.1 K/UL — SIGNIFICANT CHANGE UP (ref 1–3.3)
LYMPHOCYTES # BLD AUTO: 16.8 % — SIGNIFICANT CHANGE UP (ref 13–44)
MAGNESIUM SERPL-MCNC: 1.6 MG/DL — SIGNIFICANT CHANGE UP (ref 1.6–2.6)
MCHC RBC-ENTMCNC: 34 PG — SIGNIFICANT CHANGE UP (ref 27–34)
MCHC RBC-ENTMCNC: 34.2 GM/DL — SIGNIFICANT CHANGE UP (ref 32–36)
MCV RBC AUTO: 99.4 FL — SIGNIFICANT CHANGE UP (ref 80–100)
MONOCYTES # BLD AUTO: 0.64 K/UL — SIGNIFICANT CHANGE UP (ref 0–0.9)
MONOCYTES NFR BLD AUTO: 9.8 % — SIGNIFICANT CHANGE UP (ref 2–14)
NEUTROPHILS # BLD AUTO: 4.74 K/UL — SIGNIFICANT CHANGE UP (ref 1.8–7.4)
NEUTROPHILS NFR BLD AUTO: 72.6 % — SIGNIFICANT CHANGE UP (ref 43–77)
PHOSPHATE SERPL-MCNC: 3.2 MG/DL — SIGNIFICANT CHANGE UP (ref 2.5–4.5)
PLATELET # BLD AUTO: 228 K/UL — SIGNIFICANT CHANGE UP (ref 150–400)
POTASSIUM SERPL-MCNC: 3.8 MMOL/L — SIGNIFICANT CHANGE UP (ref 3.5–5.3)
POTASSIUM SERPL-SCNC: 3.8 MMOL/L — SIGNIFICANT CHANGE UP (ref 3.5–5.3)
RBC # BLD: 3.32 M/UL — LOW (ref 3.8–5.2)
RBC # FLD: 12.6 % — SIGNIFICANT CHANGE UP (ref 10.3–14.5)
SODIUM SERPL-SCNC: 136 MMOL/L — SIGNIFICANT CHANGE UP (ref 135–145)
WBC # BLD: 6.53 K/UL — SIGNIFICANT CHANGE UP (ref 3.8–10.5)
WBC # FLD AUTO: 6.53 K/UL — SIGNIFICANT CHANGE UP (ref 3.8–10.5)

## 2023-04-13 RX ORDER — FOSPHENYTOIN 50 MG/ML
50 INJECTION INTRAMUSCULAR; INTRAVENOUS
Refills: 0 | Status: DISCONTINUED | OUTPATIENT
Start: 2023-04-13 | End: 2023-04-20

## 2023-04-13 RX ORDER — PANTOPRAZOLE SODIUM 20 MG/1
40 TABLET, DELAYED RELEASE ORAL DAILY
Refills: 0 | Status: DISCONTINUED | OUTPATIENT
Start: 2023-04-13 | End: 2023-04-20

## 2023-04-13 RX ORDER — KETOROLAC TROMETHAMINE 30 MG/ML
15 SYRINGE (ML) INJECTION EVERY 8 HOURS
Refills: 0 | Status: DISCONTINUED | OUTPATIENT
Start: 2023-04-13 | End: 2023-04-14

## 2023-04-13 RX ORDER — FOSPHENYTOIN 50 MG/ML
100 INJECTION INTRAMUSCULAR; INTRAVENOUS
Refills: 0 | Status: DISCONTINUED | OUTPATIENT
Start: 2023-04-13 | End: 2023-04-20

## 2023-04-13 RX ORDER — LEVOTHYROXINE SODIUM 125 MCG
70 TABLET ORAL AT BEDTIME
Refills: 0 | Status: DISCONTINUED | OUTPATIENT
Start: 2023-04-13 | End: 2023-04-20

## 2023-04-13 RX ORDER — HYDROMORPHONE HYDROCHLORIDE 2 MG/ML
0.5 INJECTION INTRAMUSCULAR; INTRAVENOUS; SUBCUTANEOUS EVERY 4 HOURS
Refills: 0 | Status: DISCONTINUED | OUTPATIENT
Start: 2023-04-13 | End: 2023-04-17

## 2023-04-13 RX ORDER — ELECTROLYTE SOLUTION,INJ
1 VIAL (ML) INTRAVENOUS
Refills: 0 | Status: DISCONTINUED | OUTPATIENT
Start: 2023-04-13 | End: 2023-04-13

## 2023-04-13 RX ADMIN — Medication 15 MILLIGRAM(S): at 22:42

## 2023-04-13 RX ADMIN — Medication 400 MILLIGRAM(S): at 09:46

## 2023-04-13 RX ADMIN — FOSPHENYTOIN 102 MILLIGRAM(S) PE: 50 INJECTION INTRAMUSCULAR; INTRAVENOUS at 18:35

## 2023-04-13 RX ADMIN — SODIUM CHLORIDE 125 MILLILITER(S): 9 INJECTION, SOLUTION INTRAVENOUS at 02:35

## 2023-04-13 RX ADMIN — HEPARIN SODIUM 5000 UNIT(S): 5000 INJECTION INTRAVENOUS; SUBCUTANEOUS at 21:35

## 2023-04-13 RX ADMIN — Medication 100 GRAM(S): at 03:01

## 2023-04-13 RX ADMIN — MORPHINE SULFATE 2 MILLIGRAM(S): 50 CAPSULE, EXTENDED RELEASE ORAL at 07:56

## 2023-04-13 RX ADMIN — Medication 15 MILLIGRAM(S): at 23:00

## 2023-04-13 RX ADMIN — Medication 1 EACH: at 22:32

## 2023-04-13 RX ADMIN — Medication 1000 MILLIGRAM(S): at 10:01

## 2023-04-13 RX ADMIN — MORPHINE SULFATE 2 MILLIGRAM(S): 50 CAPSULE, EXTENDED RELEASE ORAL at 12:49

## 2023-04-13 RX ADMIN — MORPHINE SULFATE 2 MILLIGRAM(S): 50 CAPSULE, EXTENDED RELEASE ORAL at 08:11

## 2023-04-13 RX ADMIN — HEPARIN SODIUM 5000 UNIT(S): 5000 INJECTION INTRAVENOUS; SUBCUTANEOUS at 09:47

## 2023-04-13 RX ADMIN — Medication 1000 MILLIGRAM(S): at 04:15

## 2023-04-13 RX ADMIN — HYDROMORPHONE HYDROCHLORIDE 0.5 MILLIGRAM(S): 2 INJECTION INTRAMUSCULAR; INTRAVENOUS; SUBCUTANEOUS at 15:25

## 2023-04-13 RX ADMIN — SODIUM CHLORIDE 125 MILLILITER(S): 9 INJECTION, SOLUTION INTRAVENOUS at 22:42

## 2023-04-13 RX ADMIN — HYDROMORPHONE HYDROCHLORIDE 0.5 MILLIGRAM(S): 2 INJECTION INTRAMUSCULAR; INTRAVENOUS; SUBCUTANEOUS at 15:10

## 2023-04-13 RX ADMIN — Medication 400 MILLIGRAM(S): at 17:04

## 2023-04-13 RX ADMIN — Medication 70 MICROGRAM(S): at 22:15

## 2023-04-13 RX ADMIN — Medication 400 MILLIGRAM(S): at 04:04

## 2023-04-13 RX ADMIN — MORPHINE SULFATE 2 MILLIGRAM(S): 50 CAPSULE, EXTENDED RELEASE ORAL at 12:34

## 2023-04-13 RX ADMIN — FOSPHENYTOIN 104 MILLIGRAM(S) PE: 50 INJECTION INTRAMUSCULAR; INTRAVENOUS at 12:34

## 2023-04-13 NOTE — PHYSICAL THERAPY INITIAL EVALUATION ADULT - MARITAL STATUS
nearest relatives are 2 daughters Herminia and Rachael ,but pt often relies on close friends from [employer]/

## 2023-04-13 NOTE — PHYSICAL THERAPY INITIAL EVALUATION ADULT - LEVEL OF INDEPENDENCE: SIT/SUPINE, REHAB EVAL
out of bed to recliner chair postured upright supported with several pillows ,B Flowtrons on, NGT to LCWS ,IV MICHAEL,CBIR

## 2023-04-13 NOTE — PROGRESS NOTE ADULT - ASSESSMENT
ASSESSMENT  83 year old female with PMHx seizures, hypothyroid, recently diagnosed with colon cancer presents for planned laparoscopic possible open right hemicolectomy, lymph node dissection, mobilization of splenic flexure  with Dr. Lopez.    S/P lap to open R hemicolectomy, extensive GUILLERMO with anastomosis to ileum 4/12  Large invasive tumor found intraop  EBL 200cc  POD#1    PLAN  - mentation intact. AAOx 3. pain control with IV tylenol and dilaudid  - changed PO dilantin to IV fosphenytoin 100mg in AM and 50mg PM   - BP elevated, suspect 2/2 to pain. cont to monitor   - on 2L NC sating 95-97%. encourage incentive spirometer   - NPO with NGT. PPI  - cont LR @125. will start PPN  - franklin to be removed today. Cr stable. monitor I & Os and electrolytes  - IV synthroid  - s/p post op cefotetan. monitor temps and trend CBC  - DVT ppx - hep sq. SCDs  PT eval    Dispo: SICU. IV fluids. NPO. NGT start PPN. pain control    discussed with Dr. Sánchez

## 2023-04-13 NOTE — DIETITIAN INITIAL EVALUATION ADULT - ADD RECOMMEND
Initiate PPN  1) Daily weights  2) Strict I & O's  3) Daily lyte checks including magnesium and phos  4) Weekly triglycerides/LFT checks  5) POCT q6hrs; maintain 140-180mg/dL  6) if on PN > 6 days, consider placing PICC line to meet 100% of nutr needs  7) Confirm goals of care regarding long-term nutrition support   8) ADAT as per surgery  RD will continue to monitor PPN, labs, hydration, and wt prn.
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
## 2023-04-13 NOTE — PROGRESS NOTE ADULT - ASSESSMENT
An 83 year old female with ascending colon cancer  S/P open right hemicolectomy with small bowel resection    Plan:  Analgesia  Can start sips and ice chips  Ambulation  Incentive spirometry  Possible d/c NG tube  Daily labs    Plan discussed with Colorectal surgery team

## 2023-04-13 NOTE — PHYSICAL THERAPY INITIAL EVALUATION ADULT - PRECAUTIONS/LIMITATIONS, REHAB EVAL
surgical precautions Prevena device, NGT to LCWS with frothy ,bilious,brownish green output/surgical precautions

## 2023-04-13 NOTE — DIETITIAN INITIAL EVALUATION ADULT - OTHER INFO
83 year old female recently diagnosed with colon cancer presents to PST for planned laparoscopic possible open right hemicolectomy, lymph node dissection, mobilization of splenic flexure on 04/13/2023 with Dr. Lopez.    S/P open right hemicolectomy with small bowel resection on 4/12 - has NGT to LWS, possible d/c today as per surg resident. Plan to start sips of clears/ ice chips today and PPN 2/2 suspected prolonged NPO status. Pt sleeping at time of RD visit. Appears thin, frail, bony; NFPE reveals severe muscle/ fat wasting on face/ upper body. RD took bed scale wt on 4/13 at 124#. Labs WNL, will start all lytes low in PN bag and adjust prn based on labs. See recs below.

## 2023-04-13 NOTE — PHYSICAL THERAPY INITIAL EVALUATION ADULT - ADDITIONAL COMMENTS
pt [place] in [place] with 5 steps outside and 5 steps to main  level where bedroom is. Pt uses a cane, walker, shower chair, shower grab bar.  Pt reports her Christian friends drive her to appointments because her daughters  work.

## 2023-04-13 NOTE — PHYSICAL THERAPY INITIAL EVALUATION ADULT - PLANNED THERAPY INTERVENTIONS, PT EVAL
bed mobility training/gait training/ROM/strengthening/transfer training
bed mobility training/gait training/postural re-education/ROM/strengthening/transfer training

## 2023-04-13 NOTE — PHYSICAL THERAPY INITIAL EVALUATION ADULT - PATIENT PROFILE REVIEW, REHAB EVAL
yes
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

## 2023-04-13 NOTE — PHYSICAL THERAPY INITIAL EVALUATION ADULT - ACTIVE RANGE OF MOTION EXAMINATION, REHAB EVAL
L hip WFL but with decreased ROM associated with DJD/OA/bilateral upper extremity Active ROM was WFL (within functional limits)/bilateral  lower extremity Active ROM was WFL (within functional limits)
globally mildly decreased AROM L hip joint FLEX/EXT/IR/ER/ABD/jacqueline. upper extremity Active ROM was WNL (within normal limits)/RLE Active ROM was WNL (within normal limits)/Left LE Active ROM was WFL (within functional limits)/deficits as listed below

## 2023-04-13 NOTE — DIETITIAN INITIAL EVALUATION ADULT - PERTINENT LABORATORY DATA
04-13    136  |  107  |  9   ----------------------------<  130<H>  3.8   |  23  |  0.73    Ca    8.4<L>      13 Apr 2023 06:08  Phos  3.2     04-13  Mg     1.6     04-13    POCT Blood Glucose.: 149 mg/dL (04-12-23 @ 20:36)  A1C with Estimated Average Glucose Result: 5.4 % (04-06-23 @ 15:27)

## 2023-04-13 NOTE — PHYSICAL THERAPY INITIAL EVALUATION ADULT - PERTINENT HX OF CURRENT PROBLEM, REHAB EVAL
Pt now post-op laparoscopic converted to open extended right hemicolectomy, with SBR, lymphadenectomy, extensive GUILLERMO with finding of very large, invasive tumor of ascending colon invading SB and mesentery, palpable lymph nodes, ~100 cm SB resected with specimen, residual SB left ~150cm
Pt had colonoscopy and biopsy +for colon CA ,scheduled for resection surgery 4/12/23 with Dr Britton
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
## 2023-04-13 NOTE — PHYSICAL THERAPY INITIAL EVALUATION ADULT - IMPAIRMENTS FOUND, PT EVAL
gait, locomotion, and balance/joint integrity and mobility/ROM
impaired functional mobility/self-care/independence/gait, locomotion, and balance

## 2023-04-13 NOTE — PHYSICAL THERAPY INITIAL EVALUATION ADULT - GENERAL OBSERVATIONS, REHAB EVAL
supine in bed,NGT to LCWS,B Flowtrons,Prevena device to vertical & L of midline abdominal incision ,IV RUE, O2 via NC 2L/min ,rec'd Morphine and IV Tylenol prior to Rx session, awake,alert,Ox4,c/o generalized abd pain
sitting up in bedside chair awake, alert, Ox4, pleasant & cooperative , writing notes in script in note pad, on clear liquid diet presently and IVF via LUE

## 2023-04-13 NOTE — PROGRESS NOTE ADULT - SUBJECTIVE AND OBJECTIVE BOX
Patient was seen and examined at the bedside this morning  No acute events overnight  Pain is better controlled  No nausea or vomiting    O/E:  T(C): 36.8 (04-12-23 @ 21:00), Max: 36.8 (04-12-23 @ 14:30)  HR: 73 (04-13-23 @ 01:00) (61 - 75)  BP: 148/59 (04-13-23 @ 01:00) (117/44 - 160/70)  RR: 10 (04-13-23 @ 01:00) (10 - 20)  SpO2: 100% (04-13-23 @ 01:00) (92% - 100%)  Alert, conscious, oriented  No pallor, jaundice or cyanosis  Not in pain or distress  Chest clear, equal air entry bilaterally  Abdomen soft and lax, no tenderness, incisions clean and dry  Extremities: No swelling or tenderness    04-12-23 @ 07:01  -  04-13-23 @ 03:04  --------------------------------------------------------  IN: 1825 mL / OUT: 625 mL / NET: 1200 mL      Labs pending    MEDICATIONS  (STANDING):  acetaminophen   IVPB .. 1000 milliGRAM(s) IV Intermittent every 6 hours  alvimopan 12 milliGRAM(s) Oral two times a day  heparin   Injectable 5000 Unit(s) SubCutaneous every 12 hours  lactated ringers. 1000 milliLiter(s) (125 mL/Hr) IV Continuous <Continuous>  levothyroxine 100 MICROGram(s) Oral daily  multivitamin 1 Tablet(s) Oral daily  phenytoin   Capsule 100 milliGRAM(s) Oral daily  phenytoin   Chewable 50 milliGRAM(s) Chew <User Schedule>    MEDICATIONS  (PRN):  acetaminophen     Tablet .. 650 milliGRAM(s) Oral every 6 hours PRN Temp greater or equal to 38C (100.4F), Mild Pain (1 - 3)  acetaminophen   IVPB .. 1000 milliGRAM(s) IV Intermittent once PRN Temp greater or equal to 38C (100.4F), Mild Pain (1 - 3)  morphine  - Injectable 2 milliGRAM(s) IV Push every 4 hours PRN Severe Pain (7 - 10)  ondansetron Injectable 4 milliGRAM(s) IV Push every 6 hours PRN Nausea

## 2023-04-13 NOTE — PHYSICAL THERAPY INITIAL EVALUATION ADULT - DIAGNOSIS, PT EVAL
colon CA involving ascending colon with invasion of SB and mesentery ,+lymph nodes s/p open extended R hemicolectomy, SBR, lymphadenectomy ,extensive GUILLERMO and primary anastomosis
colon CA ,advanced OA/DJD L hip with gait impairment

## 2023-04-13 NOTE — DIETITIAN INITIAL EVALUATION ADULT - PERTINENT MEDS FT
MEDICATIONS  (STANDING):  acetaminophen   IVPB .. 1000 milliGRAM(s) IV Intermittent every 6 hours  alvimopan 12 milliGRAM(s) Oral two times a day  heparin   Injectable 5000 Unit(s) SubCutaneous every 12 hours  lactated ringers. 1000 milliLiter(s) (125 mL/Hr) IV Continuous <Continuous>  levothyroxine 100 MICROGram(s) Oral daily  multivitamin 1 Tablet(s) Oral daily  phenytoin   Capsule 100 milliGRAM(s) Oral daily  phenytoin   Chewable 50 milliGRAM(s) Chew <User Schedule>    MEDICATIONS  (PRN):  acetaminophen     Tablet .. 650 milliGRAM(s) Oral every 6 hours PRN Temp greater or equal to 38C (100.4F), Mild Pain (1 - 3)  acetaminophen   IVPB .. 1000 milliGRAM(s) IV Intermittent once PRN Temp greater or equal to 38C (100.4F), Mild Pain (1 - 3)  morphine  - Injectable 2 milliGRAM(s) IV Push every 4 hours PRN Severe Pain (7 - 10)  ondansetron Injectable 4 milliGRAM(s) IV Push every 6 hours PRN Nausea    Home Medications:  acetaminophen 325 mg oral tablet: 2 tab(s) orally every 6 hours, As needed, Mild Pain (11 Apr 2023 14:57)  CoQ10 100 mg oral capsule: 1 orally once a day (11 Apr 2023 14:52)  D3 25 mcg (1000 intl units) oral tablet: 1 orally once a day (11 Apr 2023 14:55)  Multiple Vitamins oral tablet: 1 orally once a day (11 Apr 2023 14:51)  Osteo Bi-Flex Triple Strength oral tablet: 2 tab(s) orally once a day (11 Apr 2023 14:54)  phenytoin 100 mg oral capsule: 1 tab(s) orally once a day (11 Apr 2023 14:55)  phenytoin 50 mg oral tablet, chewable: 1 orally once a day (at bedtime) (11 Apr 2023 14:57)  Synthroid 100 mcg (0.1 mg) oral tablet: 1 orally once a day *patient takes brand name at home * (11 Apr 2023 14:49)  turmeric 500 mg oral capsule: 1 orally once a day (11 Apr 2023 14:57)

## 2023-04-13 NOTE — PHYSICAL THERAPY INITIAL EVALUATION ADULT - LEVEL OF INDEPENDENCE: STAND/SIT, REHAB EVAL
out of bed to SDU recliner postured upright/contact guard/minimum assist (75% patients effort)
supervision/stand-by assist

## 2023-04-13 NOTE — PHYSICAL THERAPY INITIAL EVALUATION ADULT - DID THE PATIENT HAVE SURGERY?
pending surgery for colon CA tomorrow
open extended right hemicolectomy, with SBR, lymphadenectomy, extensive GUILLERMO/yes

## 2023-04-13 NOTE — PHYSICAL THERAPY INITIAL EVALUATION ADULT - AMBULATION SKILLS, REHAB EVAL
cane due to chronic L hip pain/OA bone on bone/needs device
elias Varela ,has SAC at bedside labeled with her name/needs device

## 2023-04-13 NOTE — DIETITIAN INITIAL EVALUATION ADULT - ALTERNATE MEANS OF NUTRITION
Recommendations: via peripheral venous line  Total Volume: 1800 mL x 24 hours  65 g  Amino Acids  100 g Dextrose  0 g Lipids 20% (obtain triglyceride level)  98 mEq Sodium Chloride  14 mEq Sodium Acetate  20 mmol Sodium Phosphate  32 mEq Potassium Chloride  18 mEq Potassium Acetate  0 mmol Potassium Phosphate  0 mEq Calcium Gluconate (CAPS out of PN solution)  8 mEq Magnesium Sulfate  200 mg Thiamine  1 ml Trace Elements  10 ml MVI    Total Calories   600     (Meets    33%  of  Estimated Energy needs  and    68%  Protein needs)   (osmolarity <900)/PPN

## 2023-04-13 NOTE — PHYSICAL THERAPY INITIAL EVALUATION ADULT - REFERRING PHYSICIAN, REHAB EVAL
DEMETRIUS Morrissey 4/12/23 for post-op ambulation
DEMETRIUS Morrissey (Colorectal Surgery Team) 4/9/23 (ordered pre-admit/pre-op)

## 2023-04-13 NOTE — DIETITIAN INITIAL EVALUATION ADULT - NSFNSGIIOFT_GEN_A_CORE
04-12-23 @ 07:01  -  04-13-23 @ 07:00  --------------------------------------------------------  OUT:    Nasogastric/Oral tube (mL): 50 mL  Total OUT: 50 mL    Total NET: -50 mL

## 2023-04-14 LAB
ALBUMIN SERPL ELPH-MCNC: 2.1 G/DL — LOW (ref 3.3–5)
ALP SERPL-CCNC: 67 U/L — SIGNIFICANT CHANGE UP (ref 40–120)
ALT FLD-CCNC: 15 U/L — SIGNIFICANT CHANGE UP (ref 12–78)
ANION GAP SERPL CALC-SCNC: 3 MMOL/L — LOW (ref 5–17)
AST SERPL-CCNC: 11 U/L — LOW (ref 15–37)
BASOPHILS # BLD AUTO: 0.01 K/UL — SIGNIFICANT CHANGE UP (ref 0–0.2)
BASOPHILS NFR BLD AUTO: 0.2 % — SIGNIFICANT CHANGE UP (ref 0–2)
BILIRUB SERPL-MCNC: 0.3 MG/DL — SIGNIFICANT CHANGE UP (ref 0.2–1.2)
BUN SERPL-MCNC: 12 MG/DL — SIGNIFICANT CHANGE UP (ref 7–23)
CALCIUM SERPL-MCNC: 8.2 MG/DL — LOW (ref 8.5–10.1)
CHLORIDE SERPL-SCNC: 105 MMOL/L — SIGNIFICANT CHANGE UP (ref 96–108)
CO2 SERPL-SCNC: 27 MMOL/L — SIGNIFICANT CHANGE UP (ref 22–31)
CREAT SERPL-MCNC: 0.39 MG/DL — LOW (ref 0.5–1.3)
EGFR: 98 ML/MIN/1.73M2 — SIGNIFICANT CHANGE UP
EOSINOPHIL # BLD AUTO: 0.04 K/UL — SIGNIFICANT CHANGE UP (ref 0–0.5)
EOSINOPHIL NFR BLD AUTO: 0.7 % — SIGNIFICANT CHANGE UP (ref 0–6)
GLUCOSE BLDC GLUCOMTR-MCNC: 143 MG/DL — HIGH (ref 70–99)
GLUCOSE BLDC GLUCOMTR-MCNC: 164 MG/DL — HIGH (ref 70–99)
GLUCOSE BLDC GLUCOMTR-MCNC: 178 MG/DL — HIGH (ref 70–99)
GLUCOSE SERPL-MCNC: 161 MG/DL — HIGH (ref 70–99)
HCT VFR BLD CALC: 27 % — LOW (ref 34.5–45)
HGB BLD-MCNC: 9.1 G/DL — LOW (ref 11.5–15.5)
IMM GRANULOCYTES NFR BLD AUTO: 0.3 % — SIGNIFICANT CHANGE UP (ref 0–0.9)
LYMPHOCYTES # BLD AUTO: 0.93 K/UL — LOW (ref 1–3.3)
LYMPHOCYTES # BLD AUTO: 15.7 % — SIGNIFICANT CHANGE UP (ref 13–44)
MAGNESIUM SERPL-MCNC: 1.8 MG/DL — SIGNIFICANT CHANGE UP (ref 1.6–2.6)
MCHC RBC-ENTMCNC: 33.5 PG — SIGNIFICANT CHANGE UP (ref 27–34)
MCHC RBC-ENTMCNC: 33.7 GM/DL — SIGNIFICANT CHANGE UP (ref 32–36)
MCV RBC AUTO: 99.3 FL — SIGNIFICANT CHANGE UP (ref 80–100)
MONOCYTES # BLD AUTO: 0.58 K/UL — SIGNIFICANT CHANGE UP (ref 0–0.9)
MONOCYTES NFR BLD AUTO: 9.8 % — SIGNIFICANT CHANGE UP (ref 2–14)
NEUTROPHILS # BLD AUTO: 4.35 K/UL — SIGNIFICANT CHANGE UP (ref 1.8–7.4)
NEUTROPHILS NFR BLD AUTO: 73.3 % — SIGNIFICANT CHANGE UP (ref 43–77)
PHOSPHATE SERPL-MCNC: 1.6 MG/DL — LOW (ref 2.5–4.5)
PLATELET # BLD AUTO: 202 K/UL — SIGNIFICANT CHANGE UP (ref 150–400)
POTASSIUM SERPL-MCNC: 3.6 MMOL/L — SIGNIFICANT CHANGE UP (ref 3.5–5.3)
POTASSIUM SERPL-SCNC: 3.6 MMOL/L — SIGNIFICANT CHANGE UP (ref 3.5–5.3)
PROT SERPL-MCNC: 5.4 GM/DL — LOW (ref 6–8.3)
RBC # BLD: 2.72 M/UL — LOW (ref 3.8–5.2)
RBC # FLD: 12.7 % — SIGNIFICANT CHANGE UP (ref 10.3–14.5)
SODIUM SERPL-SCNC: 135 MMOL/L — SIGNIFICANT CHANGE UP (ref 135–145)
TRIGL SERPL-MCNC: 107 MG/DL — SIGNIFICANT CHANGE UP
WBC # BLD: 5.93 K/UL — SIGNIFICANT CHANGE UP (ref 3.8–10.5)
WBC # FLD AUTO: 5.93 K/UL — SIGNIFICANT CHANGE UP (ref 3.8–10.5)

## 2023-04-14 RX ORDER — SODIUM CHLORIDE 9 MG/ML
1000 INJECTION, SOLUTION INTRAVENOUS
Refills: 0 | Status: DISCONTINUED | OUTPATIENT
Start: 2023-04-14 | End: 2023-04-21

## 2023-04-14 RX ORDER — DEXTROSE 50 % IN WATER 50 %
25 SYRINGE (ML) INTRAVENOUS ONCE
Refills: 0 | Status: DISCONTINUED | OUTPATIENT
Start: 2023-04-14 | End: 2023-04-21

## 2023-04-14 RX ORDER — DEXTROSE 50 % IN WATER 50 %
15 SYRINGE (ML) INTRAVENOUS ONCE
Refills: 0 | Status: DISCONTINUED | OUTPATIENT
Start: 2023-04-14 | End: 2023-04-21

## 2023-04-14 RX ORDER — ACETAMINOPHEN 500 MG
1000 TABLET ORAL ONCE
Refills: 0 | Status: COMPLETED | OUTPATIENT
Start: 2023-04-14 | End: 2023-04-14

## 2023-04-14 RX ORDER — DEXTROSE 50 % IN WATER 50 %
12.5 SYRINGE (ML) INTRAVENOUS ONCE
Refills: 0 | Status: DISCONTINUED | OUTPATIENT
Start: 2023-04-14 | End: 2023-04-21

## 2023-04-14 RX ORDER — SODIUM CHLORIDE 9 MG/ML
1000 INJECTION, SOLUTION INTRAVENOUS
Refills: 0 | Status: DISCONTINUED | OUTPATIENT
Start: 2023-04-14 | End: 2023-04-20

## 2023-04-14 RX ORDER — INSULIN LISPRO 100/ML
VIAL (ML) SUBCUTANEOUS EVERY 6 HOURS
Refills: 0 | Status: DISCONTINUED | OUTPATIENT
Start: 2023-04-14 | End: 2023-04-21

## 2023-04-14 RX ORDER — ELECTROLYTE SOLUTION,INJ
1 VIAL (ML) INTRAVENOUS
Refills: 0 | Status: DISCONTINUED | OUTPATIENT
Start: 2023-04-14 | End: 2023-04-14

## 2023-04-14 RX ORDER — GLUCAGON INJECTION, SOLUTION 0.5 MG/.1ML
1 INJECTION, SOLUTION SUBCUTANEOUS ONCE
Refills: 0 | Status: DISCONTINUED | OUTPATIENT
Start: 2023-04-14 | End: 2023-04-21

## 2023-04-14 RX ADMIN — HEPARIN SODIUM 5000 UNIT(S): 5000 INJECTION INTRAVENOUS; SUBCUTANEOUS at 09:25

## 2023-04-14 RX ADMIN — FOSPHENYTOIN 102 MILLIGRAM(S) PE: 50 INJECTION INTRAMUSCULAR; INTRAVENOUS at 17:54

## 2023-04-14 RX ADMIN — Medication 15 MILLIGRAM(S): at 23:10

## 2023-04-14 RX ADMIN — Medication 1: at 17:55

## 2023-04-14 RX ADMIN — Medication 85 MILLIMOLE(S): at 11:13

## 2023-04-14 RX ADMIN — Medication 1000 MILLIGRAM(S): at 04:45

## 2023-04-14 RX ADMIN — Medication 400 MILLIGRAM(S): at 14:32

## 2023-04-14 RX ADMIN — Medication 400 MILLIGRAM(S): at 04:28

## 2023-04-14 RX ADMIN — HYDROMORPHONE HYDROCHLORIDE 0.5 MILLIGRAM(S): 2 INJECTION INTRAMUSCULAR; INTRAVENOUS; SUBCUTANEOUS at 16:34

## 2023-04-14 RX ADMIN — Medication 15 MILLIGRAM(S): at 09:33

## 2023-04-14 RX ADMIN — HYDROMORPHONE HYDROCHLORIDE 0.5 MILLIGRAM(S): 2 INJECTION INTRAMUSCULAR; INTRAVENOUS; SUBCUTANEOUS at 16:45

## 2023-04-14 RX ADMIN — Medication 15 MILLIGRAM(S): at 09:50

## 2023-04-14 RX ADMIN — Medication 1000 MILLIGRAM(S): at 14:50

## 2023-04-14 RX ADMIN — Medication 15 MILLIGRAM(S): at 22:55

## 2023-04-14 RX ADMIN — Medication 70 MICROGRAM(S): at 22:56

## 2023-04-14 RX ADMIN — HYDROMORPHONE HYDROCHLORIDE 0.5 MILLIGRAM(S): 2 INJECTION INTRAMUSCULAR; INTRAVENOUS; SUBCUTANEOUS at 23:54

## 2023-04-14 RX ADMIN — PANTOPRAZOLE SODIUM 40 MILLIGRAM(S): 20 TABLET, DELAYED RELEASE ORAL at 09:25

## 2023-04-14 RX ADMIN — FOSPHENYTOIN 104 MILLIGRAM(S) PE: 50 INJECTION INTRAMUSCULAR; INTRAVENOUS at 10:41

## 2023-04-14 RX ADMIN — Medication 1 EACH: at 22:54

## 2023-04-14 RX ADMIN — ALVIMOPAN 12 MILLIGRAM(S): 12 CAPSULE ORAL at 22:57

## 2023-04-14 RX ADMIN — HEPARIN SODIUM 5000 UNIT(S): 5000 INJECTION INTRAVENOUS; SUBCUTANEOUS at 22:55

## 2023-04-14 NOTE — PROGRESS NOTE ADULT - SUBJECTIVE AND OBJECTIVE BOX
Patient was seen and examined at the bedside this morning  No acute events overnight  Pain is better controlled  No nausea or vomiting    O/E:  T(C): 36.8 (04-12-23 @ 21:00), Max: 36.8 (04-12-23 @ 14:30)  HR: 73 (04-13-23 @ 01:00) (61 - 75)  BP: 148/59 (04-13-23 @ 01:00) (117/44 - 160/70)  RR: 10 (04-13-23 @ 01:00) (10 - 20)  SpO2: 100% (04-13-23 @ 01:00) (92% - 100%)    Physical Exam:   Alert, conscious, oriented  No pallor, jaundice or cyanosis  Not in pain or distress  Chest clear, equal air entry bilaterally  Abdomen soft and lax, no tenderness, incisions clean and dry  Extremities: No swelling or tenderness    04-12-23 @ 07:01  -  04-13-23 @ 03:04  --------------------------------------------------------  IN: 1825 mL / OUT: 625 mL / NET: 1200 mL      Labs pending    MEDICATIONS  (STANDING):  acetaminophen   IVPB .. 1000 milliGRAM(s) IV Intermittent every 6 hours  alvimopan 12 milliGRAM(s) Oral two times a day  heparin   Injectable 5000 Unit(s) SubCutaneous every 12 hours  lactated ringers. 1000 milliLiter(s) (125 mL/Hr) IV Continuous <Continuous>  levothyroxine 100 MICROGram(s) Oral daily  multivitamin 1 Tablet(s) Oral daily  phenytoin   Capsule 100 milliGRAM(s) Oral daily  phenytoin   Chewable 50 milliGRAM(s) Chew <User Schedule>    MEDICATIONS  (PRN):  acetaminophen     Tablet .. 650 milliGRAM(s) Oral every 6 hours PRN Temp greater or equal to 38C (100.4F), Mild Pain (1 - 3)  acetaminophen   IVPB .. 1000 milliGRAM(s) IV Intermittent once PRN Temp greater or equal to 38C (100.4F), Mild Pain (1 - 3)  morphine  - Injectable 2 milliGRAM(s) IV Push every 4 hours PRN Severe Pain (7 - 10)  ondansetron Injectable 4 milliGRAM(s) IV Push every 6 hours PRN Nausea

## 2023-04-14 NOTE — PROGRESS NOTE ADULT - ASSESSMENT
An 83 year old female with ascending colon cancer  S/P open right hemicolectomy with small bowel resection    Plan:  Analgesia  sips and ice chips for comfort  Ambulation  Incentive spirometry  NGT  Daily labs    Plan discussed with Colorectal surgery team

## 2023-04-14 NOTE — PROGRESS NOTE ADULT - ASSESSMENT
ASSESSMENT  83 year old female with PMHx seizures, hypothyroid, recently diagnosed with colon cancer presents for planned laparoscopic possible open right hemicolectomy, lymph node dissection, mobilization of splenic flexure  with Dr. Lopez.    S/P lap to open R hemicolectomy, extensive GUILLERMO with anastomosis to ileum 4/12  Large invasive tumor found intraop  EBL 200cc  POD#2    PLAN  - mentation intact. AAOx 3. pain control with IV tylenol and toradol. pt with local reaction to morphine and dilaudid making pt confused  - cont IV fosphenytoin 100mg in AM and 50mg PM for hx seizures  - BP elevated, suspect 2/2 to pain. cont to monitor   - on room air sating 95-97%. encourage incentive spirometer   - NPO with NGT. PPI. NGT as per surgery  - cont PPN  - Cr stable. monitor I & Os and electrolytes. straight cath x 1 overnight. cont bladder scan q6hr  - IV synthroid  - s/p post op cefotetan. monitor temps and trend CBC  - DVT ppx - hep sq. SCDs  PT eval    Dispo: SICU. IV fluids. NPO. NGT. PPN. pain control. PT. monitor bowel fxn    discussed with Dr. Sánchez

## 2023-04-14 NOTE — CHART NOTE - NSCHARTNOTEFT_GEN_A_CORE
Clinical Nutrition PN Follow Up Note    *  year old admitted for    *current status    *new pertinent meds:    *labs reviewed;  04-14    135  |  105  |  12  ----------------------------<  161<H>  3.6   |  27  |  0.39<L>    Ca    8.2<L>      14 Apr 2023 06:09  Phos  1.6     04-14  Mg     1.8     04-14    TPro  5.4<L>  /  Alb  2.1<L>  /  TBili  0.3  /  DBili  x   /  AST  11<L>  /  ALT  15  /  AlkPhos  67  04-14      BMI: BMI (kg/m2): 18.8 (04-11-23 @ 10:57)  HbA1c: A1C with Estimated Average Glucose Result: 5.4 % (04-06-23 @ 15:27)    Glucose: POCT Blood Glucose.: 129 mg/dL (04-13-23 @ 23:36)    BP: 130/54 (04-14-23 @ 08:00) (117/44 - 166/65)  Lipid Panel:   POCT Blood Glucose.: 129 mg/dL (04-13-23 @ 23:36)  POCT Blood Glucose.: 111 mg/dL (04-13-23 @ 13:30)        *I&O's Detail    13 Apr 2023 07:01  -  14 Apr 2023 07:00  --------------------------------------------------------  IN:    IV PiggyBack: 100 mL    Lactated Ringers: 700 mL    PPN (Peripheral Parenteral Nutrition): 600 mL  Total IN: 1400 mL    OUT:    Indwelling Catheter - Urethral (mL): 450 mL    Intermittent Catheterization - Urethral (mL): 400 mL    Nasogastric/Oral tube (mL): 380 mL  Total OUT: 1230 mL    Total NET: 170 mL          * fluid status: BM (+) on .  pt on bowel regimen.    *malnutrition:    Estimated Needs: based on Kg (wt from )  Calories: Kcal (Kcal/Kg)  Protein:  g (g/Kg)  Fluids:   mL (mL/Kg)    Diet, NPO:   Except Medications  With Ice Chips/Sips of Water (04-12-23 @ 14:57) [Active]      Weight History:  Height (cm): 167.6 (04-11-23 @ 10:57), 167.6 (04-06-23 @ 15:13)  Weight (kg): 52.9 (04-11-23 @ 10:57), 53.1 (04-06-23 @ 17:03)  BMI (kg/m2): 18.8 (04-11-23 @ 10:57), 18.9 (04-06-23 @ 17:03)  BSA (m2): 1.59 (04-11-23 @ 10:57), 1.59 (04-06-23 @ 17:03)  IBW:  IBW %:    *will continue to monitor and adjust PN prn*    PPN Recommendations: via Peripheral Line  Total Volume: 1800mL x 24 hours  65 g  Amino Acids  100 g Dextrose  0 g Lipids 20% (If TG <400, add 50g of lipids)  mEq Sodium Chloride  mEq Sodium Acetate  mmol Sodium Phosphate  mEq Potassium Chloride  mEq Potassium Acetate  mmol Potassium Phosphate  mEq Calcium Gluconate  mEq Magnesium Sulfate  200 mg Thiamine  ml Trace Elements  ml MVI    Total Calories            (   Meets       %  of  Estimated Energy needs  and          %  Protein needs)(osmolarity)    Additional Recommendations:    1) Daily weights  2) Strict I & O's  3) Daily lyte checks including magnesium and phos  4) Weekly triglycerides/LFT checks  5) POCT q6hrs; maintain 140-180mg/dL  6) if on PN > 6 days, consider placing PICC line to meet 100% of nutr needs Clinical Nutrition PN Follow Up Note    * 83 year old female recently diagnosed with colon cancer presents to PST for planned laparoscopic possible open right hemicolectomy, lymph node dissection, mobilization of splenic flexure on 04/13/2023 with Dr. Lopez.    *PPN initiated 2/2 suspected prolonged NPO status    *current status: PPN day #2, c/w NPO and NGT to LWS. Large invasive tumor found intraop. Pt c/o abd pain, and mild nausea. denies flatus. Spoke with surgical resident this morning, plan to increase K and Phos in PN bag.    *pertinent meds: Abx, Synthroid, MVI, Protonix, Dilantin, Dilaudid, Toradol, LR    *labs reviewed; Na WNL. K+ and Mg on lower end of normal, will increase both in PN bag and adjust based on tomorrows labs. Hypophosphatemia noted, will increase by 10 mmol in PN bag. corrected Ca WNL, rec'd add 10mEq of Calcium Gluconate outside of PN bag as CAPS is out. T Bili WNL for trace elements. POCTs WNL (129, 111), please obtain BG levels q6h.  04-14    135  |  105  |  12  ----------------------------<  161<H>  3.6   |  27  |  0.39<L>    Ca    8.2<L>      14 Apr 2023 06:09  Phos  1.6     04-14  Mg     1.8     04-14    TPro  5.4<L>  /  Alb  2.1<L>  /  TBili  0.3  /  DBili  x   /  AST  11<L>  /  ALT  15  /  AlkPhos  67  04-14      BMI: BMI (kg/m2): 18.8 (04-11-23 @ 10:57)  HbA1c: A1C with Estimated Average Glucose Result: 5.4 % (04-06-23 @ 15:27)    Glucose: POCT Blood Glucose.: 129 mg/dL (04-13-23 @ 23:36)    BP: 130/54 (04-14-23 @ 08:00) (117/44 - 166/65)  Lipid Panel:   POCT Blood Glucose.: 129 mg/dL (04-13-23 @ 23:36)  POCT Blood Glucose.: 111 mg/dL (04-13-23 @ 13:30)    *I&O's Detail    13 Apr 2023 07:01  -  14 Apr 2023 07:00  --------------------------------------------------------  IN:    IV PiggyBack: 100 mL    Lactated Ringers: 700 mL    PPN (Peripheral Parenteral Nutrition): 600 mL  Total IN: 1400 mL    OUT:    Indwelling Catheter - Urethral (mL): 450 mL    Intermittent Catheterization - Urethral (mL): 400 mL    Nasogastric/Oral tube (mL): 380 mL  Total OUT: 1230 mL    Total NET: 170 mL  * fluid status: positive; BM (+) on 4/11 - liquid, loose, light brown. pt not on bowel regimen. PPN not accurately doc'd.    *malnutrition: Pt meets criteria for severe protein-calorie malnutrition in context of chronic disease r/t decreased ability to meet increased nutrient needs 2/2 cancer AEB severe muscle/ fat wasting    Estimated Needs: based on 56.2 Kg (wt from 4/13)  Calories: 5769-1412 Kcal (30-35 Kcal/Kg)  Protein:  g (1.5-2 g/Kg)  Fluids: 0914-7321 mL (30-35 mL/Kg)    Diet, NPO:   Except Medications  With Ice Chips/Sips of Water (04-12-23 @ 14:57) [Active]    Weight History:  Height (cm): 167.6 (04-11-23 @ 10:57), 167.6 (04-06-23 @ 15:13)  Weight (kg): 52.9 (04-11-23 @ 10:57), 53.1 (04-06-23 @ 17:03)  BMI (kg/m2): 18.8 (04-11-23 @ 10:57), 18.9 (04-06-23 @ 17:03)  BSA (m2): 1.59 (04-11-23 @ 10:57), 1.59 (04-06-23 @ 17:03)  IBW: 59.1kg   IBW %: 95.1%    PPN Recommendations: via Peripheral Line  Total Volume: 1800mL x 24 hours  65 g  Amino Acids  100 g Dextrose  0 g Lipids 20% (If TG <400, add 50g of lipids)  98 mEq Sodium Chloride  14 mEq Sodium Acetate  20 mmol Sodium Phosphate  30 mEq Potassium Chloride  16 mEq Potassium Acetate  10 mmol Potassium Phosphate  0 mEq Calcium Gluconate (CAPS out of PN solution)  10 mEq Magnesium Sulfate  200 mg Thiamine  1 ml Trace Elements  10 ml MVI    Total Calories   600  kcals     ( Meet   33 %  of  Estimated Energy needs  and   68 %  Protein needs)  (osmolarity ~883)    Additional Recommendations:    1) Daily weights  2) Strict I & O's  3) Daily lyte checks including magnesium and phos  4) Weekly triglycerides/LFT checks  5) POCT q6hrs; maintain 140-180mg/dL  6) if on PN > 6 days, consider placing PICC line to meet 100% of nutr needs  7) Consider dc'ing IVF while on PN    *will continue to monitor and adjust PN prn*    Monica Hall, MS, RDN, [phone]
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
## 2023-04-15 LAB
ALBUMIN SERPL ELPH-MCNC: 2 G/DL — LOW (ref 3.3–5)
ALP SERPL-CCNC: 58 U/L — SIGNIFICANT CHANGE UP (ref 40–120)
ALT FLD-CCNC: 13 U/L — SIGNIFICANT CHANGE UP (ref 12–78)
ANION GAP SERPL CALC-SCNC: 4 MMOL/L — LOW (ref 5–17)
AST SERPL-CCNC: 13 U/L — LOW (ref 15–37)
BASOPHILS # BLD AUTO: 0.02 K/UL — SIGNIFICANT CHANGE UP (ref 0–0.2)
BASOPHILS NFR BLD AUTO: 0.3 % — SIGNIFICANT CHANGE UP (ref 0–2)
BILIRUB DIRECT SERPL-MCNC: <0.1 MG/DL — SIGNIFICANT CHANGE UP (ref 0–0.3)
BILIRUB INDIRECT FLD-MCNC: >0.1 MG/DL — LOW (ref 0.2–1)
BILIRUB SERPL-MCNC: 0.2 MG/DL — SIGNIFICANT CHANGE UP (ref 0.2–1.2)
BUN SERPL-MCNC: 11 MG/DL — SIGNIFICANT CHANGE UP (ref 7–23)
CALCIUM SERPL-MCNC: 7.8 MG/DL — LOW (ref 8.5–10.1)
CHLORIDE SERPL-SCNC: 104 MMOL/L — SIGNIFICANT CHANGE UP (ref 96–108)
CO2 SERPL-SCNC: 27 MMOL/L — SIGNIFICANT CHANGE UP (ref 22–31)
CREAT SERPL-MCNC: 0.3 MG/DL — LOW (ref 0.5–1.3)
EGFR: 105 ML/MIN/1.73M2 — SIGNIFICANT CHANGE UP
EOSINOPHIL # BLD AUTO: 0.18 K/UL — SIGNIFICANT CHANGE UP (ref 0–0.5)
EOSINOPHIL NFR BLD AUTO: 3.1 % — SIGNIFICANT CHANGE UP (ref 0–6)
GLUCOSE BLDC GLUCOMTR-MCNC: 127 MG/DL — HIGH (ref 70–99)
GLUCOSE BLDC GLUCOMTR-MCNC: 143 MG/DL — HIGH (ref 70–99)
GLUCOSE BLDC GLUCOMTR-MCNC: 143 MG/DL — HIGH (ref 70–99)
GLUCOSE BLDC GLUCOMTR-MCNC: 146 MG/DL — HIGH (ref 70–99)
GLUCOSE SERPL-MCNC: 134 MG/DL — HIGH (ref 70–99)
HCT VFR BLD CALC: 24.9 % — LOW (ref 34.5–45)
HGB BLD-MCNC: 8.4 G/DL — LOW (ref 11.5–15.5)
IMM GRANULOCYTES NFR BLD AUTO: 0.2 % — SIGNIFICANT CHANGE UP (ref 0–0.9)
LYMPHOCYTES # BLD AUTO: 1.11 K/UL — SIGNIFICANT CHANGE UP (ref 1–3.3)
LYMPHOCYTES # BLD AUTO: 18.9 % — SIGNIFICANT CHANGE UP (ref 13–44)
MAGNESIUM SERPL-MCNC: 1.8 MG/DL — SIGNIFICANT CHANGE UP (ref 1.6–2.6)
MCHC RBC-ENTMCNC: 33.7 GM/DL — SIGNIFICANT CHANGE UP (ref 32–36)
MCHC RBC-ENTMCNC: 33.7 PG — SIGNIFICANT CHANGE UP (ref 27–34)
MCV RBC AUTO: 100 FL — SIGNIFICANT CHANGE UP (ref 80–100)
MONOCYTES # BLD AUTO: 0.48 K/UL — SIGNIFICANT CHANGE UP (ref 0–0.9)
MONOCYTES NFR BLD AUTO: 8.2 % — SIGNIFICANT CHANGE UP (ref 2–14)
NEUTROPHILS # BLD AUTO: 4.08 K/UL — SIGNIFICANT CHANGE UP (ref 1.8–7.4)
NEUTROPHILS NFR BLD AUTO: 69.3 % — SIGNIFICANT CHANGE UP (ref 43–77)
PHOSPHATE SERPL-MCNC: 1.9 MG/DL — LOW (ref 2.5–4.5)
PLATELET # BLD AUTO: 196 K/UL — SIGNIFICANT CHANGE UP (ref 150–400)
POTASSIUM SERPL-MCNC: 3.4 MMOL/L — LOW (ref 3.5–5.3)
POTASSIUM SERPL-SCNC: 3.4 MMOL/L — LOW (ref 3.5–5.3)
PROT SERPL-MCNC: 5.4 GM/DL — LOW (ref 6–8.3)
RBC # BLD: 2.49 M/UL — LOW (ref 3.8–5.2)
RBC # FLD: 12.9 % — SIGNIFICANT CHANGE UP (ref 10.3–14.5)
SODIUM SERPL-SCNC: 135 MMOL/L — SIGNIFICANT CHANGE UP (ref 135–145)
WBC # BLD: 5.88 K/UL — SIGNIFICANT CHANGE UP (ref 3.8–10.5)
WBC # FLD AUTO: 5.88 K/UL — SIGNIFICANT CHANGE UP (ref 3.8–10.5)

## 2023-04-15 RX ORDER — SODIUM CHLORIDE 9 MG/ML
500 INJECTION, SOLUTION INTRAVENOUS ONCE
Refills: 0 | Status: COMPLETED | OUTPATIENT
Start: 2023-04-15 | End: 2023-04-15

## 2023-04-15 RX ORDER — I.V. FAT EMULSION 20 G/100ML
0.96 EMULSION INTRAVENOUS
Qty: 50 | Refills: 0 | Status: DISCONTINUED | OUTPATIENT
Start: 2023-04-15 | End: 2023-04-15

## 2023-04-15 RX ORDER — ELECTROLYTE SOLUTION,INJ
1 VIAL (ML) INTRAVENOUS
Refills: 0 | Status: DISCONTINUED | OUTPATIENT
Start: 2023-04-15 | End: 2023-04-15

## 2023-04-15 RX ADMIN — HYDROMORPHONE HYDROCHLORIDE 0.5 MILLIGRAM(S): 2 INJECTION INTRAMUSCULAR; INTRAVENOUS; SUBCUTANEOUS at 19:55

## 2023-04-15 RX ADMIN — HYDROMORPHONE HYDROCHLORIDE 0.5 MILLIGRAM(S): 2 INJECTION INTRAMUSCULAR; INTRAVENOUS; SUBCUTANEOUS at 12:20

## 2023-04-15 RX ADMIN — Medication 1 EACH: at 22:00

## 2023-04-15 RX ADMIN — SODIUM CHLORIDE 25 MILLILITER(S): 9 INJECTION, SOLUTION INTRAVENOUS at 15:55

## 2023-04-15 RX ADMIN — ALVIMOPAN 12 MILLIGRAM(S): 12 CAPSULE ORAL at 09:41

## 2023-04-15 RX ADMIN — HYDROMORPHONE HYDROCHLORIDE 0.5 MILLIGRAM(S): 2 INJECTION INTRAMUSCULAR; INTRAVENOUS; SUBCUTANEOUS at 06:28

## 2023-04-15 RX ADMIN — Medication 1 TABLET(S): at 09:40

## 2023-04-15 RX ADMIN — HYDROMORPHONE HYDROCHLORIDE 0.5 MILLIGRAM(S): 2 INJECTION INTRAMUSCULAR; INTRAVENOUS; SUBCUTANEOUS at 16:30

## 2023-04-15 RX ADMIN — Medication 70 MICROGRAM(S): at 22:31

## 2023-04-15 RX ADMIN — FOSPHENYTOIN 104 MILLIGRAM(S) PE: 50 INJECTION INTRAMUSCULAR; INTRAVENOUS at 09:40

## 2023-04-15 RX ADMIN — HEPARIN SODIUM 5000 UNIT(S): 5000 INJECTION INTRAVENOUS; SUBCUTANEOUS at 09:40

## 2023-04-15 RX ADMIN — HYDROMORPHONE HYDROCHLORIDE 0.5 MILLIGRAM(S): 2 INJECTION INTRAMUSCULAR; INTRAVENOUS; SUBCUTANEOUS at 11:35

## 2023-04-15 RX ADMIN — SODIUM CHLORIDE 25 MILLILITER(S): 9 INJECTION, SOLUTION INTRAVENOUS at 18:21

## 2023-04-15 RX ADMIN — SODIUM CHLORIDE 500 MILLILITER(S): 9 INJECTION, SOLUTION INTRAVENOUS at 18:21

## 2023-04-15 RX ADMIN — HYDROMORPHONE HYDROCHLORIDE 0.5 MILLIGRAM(S): 2 INJECTION INTRAMUSCULAR; INTRAVENOUS; SUBCUTANEOUS at 15:55

## 2023-04-15 RX ADMIN — HEPARIN SODIUM 5000 UNIT(S): 5000 INJECTION INTRAVENOUS; SUBCUTANEOUS at 21:53

## 2023-04-15 RX ADMIN — HYDROMORPHONE HYDROCHLORIDE 0.5 MILLIGRAM(S): 2 INJECTION INTRAMUSCULAR; INTRAVENOUS; SUBCUTANEOUS at 20:10

## 2023-04-15 RX ADMIN — I.V. FAT EMULSION 20.83 GM/KG/DAY: 20 EMULSION INTRAVENOUS at 22:00

## 2023-04-15 RX ADMIN — PANTOPRAZOLE SODIUM 40 MILLIGRAM(S): 20 TABLET, DELAYED RELEASE ORAL at 09:40

## 2023-04-15 RX ADMIN — HYDROMORPHONE HYDROCHLORIDE 0.5 MILLIGRAM(S): 2 INJECTION INTRAMUSCULAR; INTRAVENOUS; SUBCUTANEOUS at 00:04

## 2023-04-15 RX ADMIN — HYDROMORPHONE HYDROCHLORIDE 0.5 MILLIGRAM(S): 2 INJECTION INTRAMUSCULAR; INTRAVENOUS; SUBCUTANEOUS at 06:09

## 2023-04-15 RX ADMIN — FOSPHENYTOIN 102 MILLIGRAM(S) PE: 50 INJECTION INTRAMUSCULAR; INTRAVENOUS at 18:21
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
## 2023-04-15 NOTE — PROGRESS NOTE ADULT - ASSESSMENT
An 83 year old female with ascending colon cancer  S/P open right hemicolectomy with small bowel resection    Plan:  Analgesia  sips and ice chips for comfort  Ambulation  Incentive spirometry  Daily labs    Plan discussed with Colorectal surgery team

## 2023-04-15 NOTE — PHARMACY COMMUNICATION NOTE - COMMENTS
Pharmacy PN Follow Up Note    *new pertinent meds: NaPhos 30mmol given 4/14/23    *labs reviewed; added 50gm lipids (4/14/23 triglycerides=107); increased KPhos from 10 to 20mmol; increased NaPhos from 20 to 25 mmol    04-15    135  |  104  |  11  ----------------------------<  134<H>  3.4<L>   |  27  |  0.30<L>    Ca    7.8<L>      15 Apr 2023 05:44  Phos  1.9     04-15  Mg     1.8     04-15    TPro  5.4<L>  /  Alb  2.1<L>  /  TBili  0.3  /  DBili  x   /  AST  11<L>  /  ALT  15  /  AlkPhos  67  04-14      BMI: BMI (kg/m2): 18.8 (04-11-23 @ 10:57)  HbA1c: A1C with Estimated Average Glucose Result: 5.4 % (04-06-23 @ 15:27)    Glucose: POCT Blood Glucose.: 127 mg/dL (04-15-23 @ 05:44)    BP: 109/49 (04-15-23 @ 08:00) (109/49 - 166/65)  Lipid Panel: Date/Time: 04-14-23 @ 06:09  Cholesterol, Serum: --  Direct LDL: --  HDL Cholesterol, Serum: --  Total Cholesterol/HDL Ration Measurement: --  Triglycerides, Serum: 107    POCT Blood Glucose.: 127 mg/dL (04-15-23 @ 05:44)  POCT Blood Glucose.: 143 mg/dL (04-14-23 @ 23:02)  POCT Blood Glucose.: 178 mg/dL (04-14-23 @ 17:08)  POCT Blood Glucose.: 164 mg/dL (04-14-23 @ 12:33)        *I&O's Detail    14 Apr 2023 07:01  -  15 Apr 2023 07:00  --------------------------------------------------------  IN:    IV PiggyBack: 600 mL    Lactated Ringers: 492 mL    PPN (Peripheral Parenteral Nutrition): 1372 mL  Total IN: 2464 mL    OUT:    Indwelling Catheter - Urethral (mL): 500 mL    Intermittent Catheterization - Urethral (mL): 350 mL  Total OUT: 850 mL    Total NET: 1614 mL              *will continue to monitor and adjust PN prn*    PPN Recommendations: via peripheral line  Total Volume: 1800 mL over 24 hours  65 g  Amino Acids  100 g Dextrose  50 g Lipids 20%  98 mEq Sodium Chloride  14 mEq Sodium Acetate  25 mmol Sodium Phosphate  30 mEq Potassium Chloride  16 mEq Potassium Acetate  20 mmol Potassium Phosphate  0 mEq Calcium Gluconate  10 mEq Magnesium Sulfate  200 mg Thiamine  1 ml Trace Elements  10 ml MVI    Total Calories: 1100 kcal     (Provides 56% of daily energy requirements and 58% of daily protein requirements)    (osmolarity = approx. 900)

## 2023-04-15 NOTE — CHART NOTE - NSCHARTNOTEFT_GEN_A_CORE
Clinical Nutrition PN Follow Up Note    * 83 year old female recently diagnosed with colon cancer presents to PST for planned laparoscopic possible open right hemicolectomy, lymph node dissection, mobilization of splenic flexure on 04/13/2023 with Dr. Lopez.    *PPN initiated 2/2 suspected prolonged NPO status    *4/14 -  PPN day #2, c/w NPO and NGT to LWS. Large invasive tumor found intraop. Pt c/o abd pain, and mild nausea. denies flatus. Spoke with surgical resident this morning, plan to increase K and Phos in PN bag.      *current status: 4/15 - PPN day #3, c/w NPO and NGT to LWS. Pt still c/o abd pain, no flatus; pain is better controlled and no N/V as per MD note. C/w PPN as per surgical resident Dr. Lechuga. See below for other recommendations.      *pertinent meds: Abx, Synthroid, MVI, Protonix, Dilantin, Dilaudid, Toradol, LR    *labs reviewed;     04-15    135  |  104  |  11  ----------------------------<  134<H>  3.4<L>   |  27  |  0.30<L>    Ca    7.8<L>      15 Apr 2023 05:44  Phos  1.9     04-15  Mg     1.8     04-15    TPro  5.4<L>  /  Alb  2.1<L>  /  TBili  0.3  /  DBili  x   /  AST  11<L>  /  ALT  15  /  AlkPhos  67  04-14      BMI: BMI (kg/m2): 18.8 (04-11-23 @ 10:57)  HbA1c: A1C with Estimated Average Glucose Result: 5.4 % (04-06-23 @ 15:27)    Glucose: POCT Blood Glucose.: 127 mg/dL (04-15-23 @ 05:44)    BP: 117/56 (04-15-23 @ 03:00) (117/44 - 166/65)  Lipid Panel: Date/Time: 04-14-23 @ 06:09  Triglycerides, Serum: 107    POCT Blood Glucose.: 127 mg/dL (15 Apr 2023 05:44)  POCT Blood Glucose.: 143 mg/dL (14 Apr 2023 23:02)  POCT Blood Glucose.: 178 mg/dL (14 Apr 2023 17:08)  POCT Blood Glucose.: 164 mg/dL (14 Apr 2023 12:33)    *I&O's Detail    14 Apr 2023 07:01  -  15 Apr 2023 07:00  --------------------------------------------------------  IN:    IV PiggyBack: 600 mL    Lactated Ringers: 492 mL    PPN (Peripheral Parenteral Nutrition): 1372 mL  Total IN: 2464 mL    OUT:    Indwelling Catheter - Urethral (mL): 500 mL    Intermittent Catheterization - Urethral (mL): 350 mL  Total OUT: 850 mL    Total NET: 1614 mL    *malnutrition: Pt meets criteria for severe protein-calorie malnutrition in context of chronic disease r/t decreased ability to meet increased nutrient needs 2/2 cancer AEB severe muscle/ fat wasting    Estimated Needs: based on 56.2 Kg (wt from 4/13)  Calories: 7987-5840 Kcal (30-35 Kcal/Kg)  Protein:  g (1.5-2 g/Kg)  Fluids: 8888-5606 mL (30-35 mL/Kg)    Diet, NPO:   Except Medications  With Ice Chips/Sips of Water (04-12-23 @ 14:57) [Active]    Weight History:  Height (cm): 167.6 (04-11-23 @ 10:57), 167.6 (04-06-23 @ 15:13)  Weight (kg): 52.9 (04-11-23 @ 10:57), 53.1 (04-06-23 @ 17:03)  BMI (kg/m2): 18.8 (04-11-23 @ 10:57), 18.9 (04-06-23 @ 17:03)  BSA (m2): 1.59 (04-11-23 @ 10:57), 1.59 (04-06-23 @ 17:03)  IBW: 59.1kg   IBW %: 95.1%    **PPN via pheripheral venous line recommendations as per pharmacy - see pharmacy intervention note      Additional Recommendations:    1) Daily weights  2) Strict I & O's  3) Daily lyte checks including magnesium and phos  4) Weekly triglycerides/LFT checks  5) POCT q6hrs; maintain 140-180mg/dL  6) if on PN > 6 days, consider placing PICC line to meet 100% of nutr needs  7) Consider dc'ing IVF while on PN    *will continue to monitor and adjust PN prn*    Ondina Espino M.S., Registered Dietitian Nutritionist [phone]

## 2023-04-16 LAB
ALBUMIN SERPL ELPH-MCNC: 2 G/DL — LOW (ref 3.3–5)
ALP SERPL-CCNC: 65 U/L — SIGNIFICANT CHANGE UP (ref 40–120)
ALT FLD-CCNC: 13 U/L — SIGNIFICANT CHANGE UP (ref 12–78)
ANION GAP SERPL CALC-SCNC: 4 MMOL/L — LOW (ref 5–17)
AST SERPL-CCNC: 10 U/L — LOW (ref 15–37)
BASOPHILS # BLD AUTO: 0.03 K/UL — SIGNIFICANT CHANGE UP (ref 0–0.2)
BASOPHILS NFR BLD AUTO: 0.7 % — SIGNIFICANT CHANGE UP (ref 0–2)
BILIRUB SERPL-MCNC: 0.2 MG/DL — SIGNIFICANT CHANGE UP (ref 0.2–1.2)
BUN SERPL-MCNC: 11 MG/DL — SIGNIFICANT CHANGE UP (ref 7–23)
CALCIUM SERPL-MCNC: 7.9 MG/DL — LOW (ref 8.5–10.1)
CHLORIDE SERPL-SCNC: 106 MMOL/L — SIGNIFICANT CHANGE UP (ref 96–108)
CO2 SERPL-SCNC: 28 MMOL/L — SIGNIFICANT CHANGE UP (ref 22–31)
CREAT SERPL-MCNC: 0.35 MG/DL — LOW (ref 0.5–1.3)
EGFR: 101 ML/MIN/1.73M2 — SIGNIFICANT CHANGE UP
EOSINOPHIL # BLD AUTO: 0.23 K/UL — SIGNIFICANT CHANGE UP (ref 0–0.5)
EOSINOPHIL NFR BLD AUTO: 5 % — SIGNIFICANT CHANGE UP (ref 0–6)
GLUCOSE BLDC GLUCOMTR-MCNC: 122 MG/DL — HIGH (ref 70–99)
GLUCOSE BLDC GLUCOMTR-MCNC: 135 MG/DL — HIGH (ref 70–99)
GLUCOSE BLDC GLUCOMTR-MCNC: 148 MG/DL — HIGH (ref 70–99)
GLUCOSE BLDC GLUCOMTR-MCNC: 149 MG/DL — HIGH (ref 70–99)
GLUCOSE SERPL-MCNC: 146 MG/DL — HIGH (ref 70–99)
HCT VFR BLD CALC: 24.7 % — LOW (ref 34.5–45)
HGB BLD-MCNC: 8.3 G/DL — LOW (ref 11.5–15.5)
IMM GRANULOCYTES NFR BLD AUTO: 0.2 % — SIGNIFICANT CHANGE UP (ref 0–0.9)
LYMPHOCYTES # BLD AUTO: 0.81 K/UL — LOW (ref 1–3.3)
LYMPHOCYTES # BLD AUTO: 17.6 % — SIGNIFICANT CHANGE UP (ref 13–44)
MAGNESIUM SERPL-MCNC: 1.9 MG/DL — SIGNIFICANT CHANGE UP (ref 1.6–2.6)
MCHC RBC-ENTMCNC: 33.6 GM/DL — SIGNIFICANT CHANGE UP (ref 32–36)
MCHC RBC-ENTMCNC: 34.2 PG — HIGH (ref 27–34)
MCV RBC AUTO: 101.6 FL — HIGH (ref 80–100)
MONOCYTES # BLD AUTO: 0.39 K/UL — SIGNIFICANT CHANGE UP (ref 0–0.9)
MONOCYTES NFR BLD AUTO: 8.5 % — SIGNIFICANT CHANGE UP (ref 2–14)
NEUTROPHILS # BLD AUTO: 3.14 K/UL — SIGNIFICANT CHANGE UP (ref 1.8–7.4)
NEUTROPHILS NFR BLD AUTO: 68 % — SIGNIFICANT CHANGE UP (ref 43–77)
PHOSPHATE SERPL-MCNC: 2.6 MG/DL — SIGNIFICANT CHANGE UP (ref 2.5–4.5)
PLATELET # BLD AUTO: 194 K/UL — SIGNIFICANT CHANGE UP (ref 150–400)
POTASSIUM SERPL-MCNC: 3.8 MMOL/L — SIGNIFICANT CHANGE UP (ref 3.5–5.3)
POTASSIUM SERPL-SCNC: 3.8 MMOL/L — SIGNIFICANT CHANGE UP (ref 3.5–5.3)
PROT SERPL-MCNC: 5.6 GM/DL — LOW (ref 6–8.3)
RBC # BLD: 2.43 M/UL — LOW (ref 3.8–5.2)
RBC # FLD: 13 % — SIGNIFICANT CHANGE UP (ref 10.3–14.5)
SODIUM SERPL-SCNC: 138 MMOL/L — SIGNIFICANT CHANGE UP (ref 135–145)
WBC # BLD: 4.61 K/UL — SIGNIFICANT CHANGE UP (ref 3.8–10.5)
WBC # FLD AUTO: 4.61 K/UL — SIGNIFICANT CHANGE UP (ref 3.8–10.5)

## 2023-04-16 RX ORDER — ACETAMINOPHEN 500 MG
1000 TABLET ORAL ONCE
Refills: 0 | Status: COMPLETED | OUTPATIENT
Start: 2023-04-16 | End: 2023-04-16

## 2023-04-16 RX ORDER — POTASSIUM CHLORIDE 20 MEQ
10 PACKET (EA) ORAL
Refills: 0 | Status: COMPLETED | OUTPATIENT
Start: 2023-04-16 | End: 2023-04-16

## 2023-04-16 RX ORDER — ELECTROLYTE SOLUTION,INJ
1 VIAL (ML) INTRAVENOUS
Refills: 0 | Status: DISCONTINUED | OUTPATIENT
Start: 2023-04-16 | End: 2023-04-16

## 2023-04-16 RX ORDER — ACETAMINOPHEN 500 MG
1000 TABLET ORAL ONCE
Refills: 0 | Status: DISCONTINUED | OUTPATIENT
Start: 2023-04-16 | End: 2023-04-21

## 2023-04-16 RX ORDER — I.V. FAT EMULSION 20 G/100ML
0.96 EMULSION INTRAVENOUS
Qty: 50 | Refills: 0 | Status: DISCONTINUED | OUTPATIENT
Start: 2023-04-16 | End: 2023-04-16

## 2023-04-16 RX ORDER — POTASSIUM PHOSPHATE, MONOBASIC POTASSIUM PHOSPHATE, DIBASIC 236; 224 MG/ML; MG/ML
15 INJECTION, SOLUTION INTRAVENOUS ONCE
Refills: 0 | Status: COMPLETED | OUTPATIENT
Start: 2023-04-16 | End: 2023-04-16

## 2023-04-16 RX ORDER — ALVIMOPAN 12 MG/1
12 CAPSULE ORAL
Refills: 0 | Status: COMPLETED | OUTPATIENT
Start: 2023-04-16 | End: 2023-04-19

## 2023-04-16 RX ADMIN — Medication 1 EACH: at 23:17

## 2023-04-16 RX ADMIN — Medication 100 MILLIEQUIVALENT(S): at 17:20

## 2023-04-16 RX ADMIN — Medication 100 MILLIEQUIVALENT(S): at 12:46

## 2023-04-16 RX ADMIN — Medication 10 MILLIGRAM(S): at 15:36

## 2023-04-16 RX ADMIN — Medication 1000 MILLIGRAM(S): at 21:25

## 2023-04-16 RX ADMIN — I.V. FAT EMULSION 20.83 GM/KG/DAY: 20 EMULSION INTRAVENOUS at 23:20

## 2023-04-16 RX ADMIN — Medication 100 MILLIEQUIVALENT(S): at 10:11

## 2023-04-16 RX ADMIN — Medication 70 MICROGRAM(S): at 21:41

## 2023-04-16 RX ADMIN — Medication 400 MILLIGRAM(S): at 12:03

## 2023-04-16 RX ADMIN — PANTOPRAZOLE SODIUM 40 MILLIGRAM(S): 20 TABLET, DELAYED RELEASE ORAL at 09:25

## 2023-04-16 RX ADMIN — Medication 1000 MILLIGRAM(S): at 12:15

## 2023-04-16 RX ADMIN — HYDROMORPHONE HYDROCHLORIDE 0.5 MILLIGRAM(S): 2 INJECTION INTRAMUSCULAR; INTRAVENOUS; SUBCUTANEOUS at 09:30

## 2023-04-16 RX ADMIN — Medication 400 MILLIGRAM(S): at 21:10

## 2023-04-16 RX ADMIN — Medication 1 TABLET(S): at 09:25

## 2023-04-16 RX ADMIN — POTASSIUM PHOSPHATE, MONOBASIC POTASSIUM PHOSPHATE, DIBASIC 62.5 MILLIMOLE(S): 236; 224 INJECTION, SOLUTION INTRAVENOUS at 21:24

## 2023-04-16 RX ADMIN — FOSPHENYTOIN 102 MILLIGRAM(S) PE: 50 INJECTION INTRAMUSCULAR; INTRAVENOUS at 17:51

## 2023-04-16 RX ADMIN — ALVIMOPAN 12 MILLIGRAM(S): 12 CAPSULE ORAL at 21:27

## 2023-04-16 RX ADMIN — FOSPHENYTOIN 104 MILLIGRAM(S) PE: 50 INJECTION INTRAMUSCULAR; INTRAVENOUS at 09:25

## 2023-04-16 RX ADMIN — HEPARIN SODIUM 5000 UNIT(S): 5000 INJECTION INTRAVENOUS; SUBCUTANEOUS at 21:25

## 2023-04-16 RX ADMIN — HEPARIN SODIUM 5000 UNIT(S): 5000 INJECTION INTRAVENOUS; SUBCUTANEOUS at 09:24

## 2023-04-16 RX ADMIN — HYDROMORPHONE HYDROCHLORIDE 0.5 MILLIGRAM(S): 2 INJECTION INTRAMUSCULAR; INTRAVENOUS; SUBCUTANEOUS at 09:13

## 2023-04-16 RX ADMIN — HYDROMORPHONE HYDROCHLORIDE 0.5 MILLIGRAM(S): 2 INJECTION INTRAMUSCULAR; INTRAVENOUS; SUBCUTANEOUS at 03:40

## 2023-04-16 RX ADMIN — HYDROMORPHONE HYDROCHLORIDE 0.5 MILLIGRAM(S): 2 INJECTION INTRAMUSCULAR; INTRAVENOUS; SUBCUTANEOUS at 04:00
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
## 2023-04-16 NOTE — PROGRESS NOTE ADULT - ASSESSMENT
An 83 year old female with ascending colon cancer  S/P open right hemicolectomy with small bowel resection    Plan:  Analgesia  sips and ice chips for comfort  no bowel function yet  PT- Ambulation  Incentive spirometry  DVT ppx  Daily labs    Plan discussed with Colorectal surgery team

## 2023-04-16 NOTE — PHARMACY COMMUNICATION NOTE - COMMENTS
Pharmacy PN Follow Up Note    *new pertinent meds: no electrolyte supplementation outside PPN bag yesterday    *labs reviewed; increased Magnesium sulfate from 10 to 12 mEq; increased NaPhos from 25 to 30 mmol; increased KPhos from 20 to 25 mmol    04-16    138  |  106  |  11  ----------------------------<  146<H>  3.8   |  28  |  0.35<L>    Ca    7.9<L>      16 Apr 2023 04:47  Phos  2.6     04-16  Mg     1.9     04-16    TPro  5.6<L>  /  Alb  2.0<L>  /  TBili  0.2  /  DBili  x   /  AST  10<L>  /  ALT  13  /  AlkPhos  65  04-16      BMI: BMI (kg/m2): 18.8 (04-11-23 @ 10:57)  HbA1c: A1C with Estimated Average Glucose Result: 5.4 % (04-06-23 @ 15:27)    Glucose: POCT Blood Glucose.: 149 mg/dL (04-16-23 @ 04:43)    BP: 131/54 (04-16-23 @ 07:00) (109/49 - 166/65)  Lipid Panel: Date/Time: 04-14-23 @ 06:09  Cholesterol, Serum: --  Direct LDL: --  HDL Cholesterol, Serum: --  Total Cholesterol/HDL Ration Measurement: --  Triglycerides, Serum: 107    POCT Blood Glucose.: 149 mg/dL (04-16-23 @ 04:43)  POCT Blood Glucose.: 146 mg/dL (04-15-23 @ 22:10)  POCT Blood Glucose.: 143 mg/dL (04-15-23 @ 17:05)  POCT Blood Glucose.: 143 mg/dL (04-15-23 @ 11:37)        *I&O's Detail    15 Apr 2023 07:01  -  16 Apr 2023 07:00  --------------------------------------------------------  IN:    Fat Emulsion (Fish Oil &amp; Plant Based) 20% Infusion: 208 mL    Lactated Ringers: 250 mL    PPN (Peripheral Parenteral Nutrition): 750 mL  Total IN: 1208 mL    OUT:    Indwelling Catheter - Urethral (mL): 1300 mL  Total OUT: 1300 mL    Total NET: -92 mL              *will continue to monitor and adjust PN prn*    PPN Recommendations: via peripheral line  Total Volume: 1800 mL over 24 hours  65 g  Amino Acids  100 g Dextrose  50 g Lipids 20%  98 mEq Sodium Chloride  14 mEq Sodium Acetate  30 mmol Sodium Phosphate  30 mEq Potassium Chloride  16 mEq Potassium Acetate  25 mmol Potassium Phosphate  0 mEq Calcium Gluconate  12 mEq Magnesium Sulfate  200 mg Thiamine  1 ml Trace Elements  10 ml MVI    Total Calories: 1100 kcal     (Provides 56% of daily energy requirements and 58% of daily protein requirements)    (osmolarity = approx. 900)

## 2023-04-16 NOTE — PROGRESS NOTE ADULT - ASSESSMENT
83 year old female with PMHx seizures, hypothyroid, recently diagnosed with colon cancer presents for planned laparoscopic possible open right hemicolectomy, lymph node dissection, mobilization of splenic flexure  with Dr. Lopez.    S/P lap to open R hemicolectomy, extensive GUILLERMO with anastomosis to ileum 4/12  Large invasive tumor found intraop  EBL 200cc  POD#4    PLAN  - mentation intact. AAOx 3. pain control ok  - cont IV fosphenytoin 100mg in AM and 50mg PM for hx seizures change to PO when taking po  - BP ok   - on room air sating 95-97%. encourage incentive spirometer   - NPO with NGT. PPI. NGT as per surgery  - cont PPN  - Cr stable. monitor I & Os and electrolytes. straight cath x 1 overnight. cont bladder scan q6hr  - IV synthroid  - DVT ppx - hep sq. SCDs

## 2023-04-16 NOTE — CHART NOTE - NSCHARTNOTEFT_GEN_A_CORE
Clinical Nutrition PN Follow Up Note    * 83 year old female recently diagnosed with colon cancer presents to PST for planned laparoscopic possible open right hemicolectomy, lymph node dissection, mobilization of splenic flexure on 04/13/2023 with Dr. Lopez.    *PPN initiated 2/2 suspected prolonged NPO status    *4/14 -  PPN day #2, c/w NPO and NGT to LWS. Large invasive tumor found intraop. Pt c/o abd pain, and mild nausea. denies flatus. Spoke with surgical resident this morning, plan to increase K and Phos in PN bag.    4/15 - PPN day #3, c/w NPO and NGT to LWS. Pt still c/o abd pain, no flatus; pain is better controlled and no N/V as per MD note. C/w PPN as per surgical resident Dr. Lechuga. See below for other recommendations.      *current status: 4/16 - PPN day #4, NGT discontinued and NPO x 6 days; trial of sips and ice chips for comfort. Is encouraged to ambulate to stimulate bowel function 2-3x/day. C/w no bowel function, pain better controlled and no N/V as per MD note. Consider placing PICC line so start TPN so pt can meet 100% of ENN. C/w PPN as per surgical resident.       *pertinent meds: Abx, Synthroid, MVI, Protonix, Dilantin, Dilaudid, Toradol, LR    *labs reviewed;     04-16    138  |  106  |  11  ----------------------------<  146<H>  3.8   |  28  |  0.35<L>    Ca    7.9<L>      16 Apr 2023 04:47  Phos  2.6     04-16  Mg     1.9     04-16    TPro  5.6<L>  /  Alb  2.0<L>  /  TBili  0.2  /  DBili  x   /  AST  10<L>  /  ALT  13  /  AlkPhos  65  04-16      BMI: BMI (kg/m2): 18.8 (04-11-23 @ 10:57)  HbA1c: A1C with Estimated Average Glucose Result: 5.4 % (04-06-23 @ 15:27)    Glucose: POCT Blood Glucose.: 149 mg/dL (04-16-23 @ 04:43)    BP: 132/52 (04-16-23 @ 06:00) (109/49 - 166/65)  Lipid Panel: Date/Time: 04-14-23 @ 06:09  Triglycerides, Serum: 107    CAPILLARY BLOOD GLUCOSE    POCT Blood Glucose.: 149 mg/dL (16 Apr 2023 04:43)  POCT Blood Glucose.: 146 mg/dL (15 Apr 2023 22:10)  POCT Blood Glucose.: 143 mg/dL (15 Apr 2023 17:05)  POCT Blood Glucose.: 143 mg/dL (15 Apr 2023 11:37)    *I&O's Detail    I&O's Detail    15 Apr 2023 07:01  -  16 Apr 2023 07:00  --------------------------------------------------------  IN:    Fat Emulsion (Fish Oil &amp; Plant Based) 20% Infusion: 208 mL    Lactated Ringers: 250 mL    PPN (Peripheral Parenteral Nutrition): 750 mL  Total IN: 1208 mL    OUT:    Indwelling Catheter - Urethral (mL): 1300 mL  Total OUT: 1300 mL    Total NET: -92 mL      *malnutrition: Pt meets criteria for severe protein-calorie malnutrition in context of chronic disease r/t decreased ability to meet increased nutrient needs 2/2 cancer AEB severe muscle/ fat wasting    Estimated Needs: based on 56.2 Kg (wt from 4/13)  Calories: 9564-1093 Kcal (30-35 Kcal/Kg)  Protein:  g (1.5-2 g/Kg)  Fluids: 0633-4679 mL (30-35 mL/Kg)    Diet, NPO:   Except Medications  With Ice Chips/Sips of Water (04-12-23 @ 14:57) [Active]    Weight History:  Height (cm): 167.6 (04-11-23 @ 10:57), 167.6 (04-06-23 @ 15:13)  Weight (kg): 52.9 (04-11-23 @ 10:57), 53.1 (04-06-23 @ 17:03)  BMI (kg/m2): 18.8 (04-11-23 @ 10:57), 18.9 (04-06-23 @ 17:03)  BSA (m2): 1.59 (04-11-23 @ 10:57), 1.59 (04-06-23 @ 17:03)  IBW: 59.1kg   IBW %: 95.1%    **PPN via pheripheral venous line recommendations as per pharmacy - see pharmacy intervention note      Additional Recommendations:    1) Daily weights  2) Strict I & O's  3) Daily lyte checks including magnesium and phos  4) Weekly triglycerides/LFT checks  5) POCT q6hrs; maintain 140-180mg/dL  6) If on PN > 6 days, consider placing PICC line to meet 100% of nutr needs  7) Consider dc'ing IVF while on PN  8) Confirm goals of care regarding LONG-TERM nutrition support      Ondina Espino M.S., Registered Dietitian Nutritionist [phone]
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
## 2023-04-17 LAB
ALBUMIN SERPL ELPH-MCNC: 2.1 G/DL — LOW (ref 3.3–5)
ALP SERPL-CCNC: 87 U/L — SIGNIFICANT CHANGE UP (ref 40–120)
ALT FLD-CCNC: 16 U/L — SIGNIFICANT CHANGE UP (ref 12–78)
ANION GAP SERPL CALC-SCNC: 5 MMOL/L — SIGNIFICANT CHANGE UP (ref 5–17)
AST SERPL-CCNC: 16 U/L — SIGNIFICANT CHANGE UP (ref 15–37)
BASOPHILS # BLD AUTO: 0.02 K/UL — SIGNIFICANT CHANGE UP (ref 0–0.2)
BASOPHILS NFR BLD AUTO: 0.4 % — SIGNIFICANT CHANGE UP (ref 0–2)
BILIRUB SERPL-MCNC: 0.2 MG/DL — SIGNIFICANT CHANGE UP (ref 0.2–1.2)
BUN SERPL-MCNC: 10 MG/DL — SIGNIFICANT CHANGE UP (ref 7–23)
CALCIUM SERPL-MCNC: 8.1 MG/DL — LOW (ref 8.5–10.1)
CHLORIDE SERPL-SCNC: 106 MMOL/L — SIGNIFICANT CHANGE UP (ref 96–108)
CO2 SERPL-SCNC: 25 MMOL/L — SIGNIFICANT CHANGE UP (ref 22–31)
CREAT SERPL-MCNC: 0.32 MG/DL — LOW (ref 0.5–1.3)
EGFR: 103 ML/MIN/1.73M2 — SIGNIFICANT CHANGE UP
EOSINOPHIL # BLD AUTO: 0.34 K/UL — SIGNIFICANT CHANGE UP (ref 0–0.5)
EOSINOPHIL NFR BLD AUTO: 6.8 % — HIGH (ref 0–6)
GLUCOSE BLDC GLUCOMTR-MCNC: 123 MG/DL — HIGH (ref 70–99)
GLUCOSE BLDC GLUCOMTR-MCNC: 128 MG/DL — HIGH (ref 70–99)
GLUCOSE BLDC GLUCOMTR-MCNC: 134 MG/DL — HIGH (ref 70–99)
GLUCOSE SERPL-MCNC: 133 MG/DL — HIGH (ref 70–99)
HCT VFR BLD CALC: 26.8 % — LOW (ref 34.5–45)
HGB BLD-MCNC: 8.8 G/DL — LOW (ref 11.5–15.5)
IMM GRANULOCYTES NFR BLD AUTO: 0.2 % — SIGNIFICANT CHANGE UP (ref 0–0.9)
LYMPHOCYTES # BLD AUTO: 0.86 K/UL — LOW (ref 1–3.3)
LYMPHOCYTES # BLD AUTO: 17.2 % — SIGNIFICANT CHANGE UP (ref 13–44)
MAGNESIUM SERPL-MCNC: 2.2 MG/DL — SIGNIFICANT CHANGE UP (ref 1.6–2.6)
MCHC RBC-ENTMCNC: 32.8 GM/DL — SIGNIFICANT CHANGE UP (ref 32–36)
MCHC RBC-ENTMCNC: 33.3 PG — SIGNIFICANT CHANGE UP (ref 27–34)
MCV RBC AUTO: 101.5 FL — HIGH (ref 80–100)
MONOCYTES # BLD AUTO: 0.45 K/UL — SIGNIFICANT CHANGE UP (ref 0–0.9)
MONOCYTES NFR BLD AUTO: 9 % — SIGNIFICANT CHANGE UP (ref 2–14)
NEUTROPHILS # BLD AUTO: 3.32 K/UL — SIGNIFICANT CHANGE UP (ref 1.8–7.4)
NEUTROPHILS NFR BLD AUTO: 66.4 % — SIGNIFICANT CHANGE UP (ref 43–77)
PHOSPHATE SERPL-MCNC: 4.2 MG/DL — SIGNIFICANT CHANGE UP (ref 2.5–4.5)
PLATELET # BLD AUTO: 228 K/UL — SIGNIFICANT CHANGE UP (ref 150–400)
POTASSIUM SERPL-MCNC: 4.4 MMOL/L — SIGNIFICANT CHANGE UP (ref 3.5–5.3)
POTASSIUM SERPL-SCNC: 4.4 MMOL/L — SIGNIFICANT CHANGE UP (ref 3.5–5.3)
PROT SERPL-MCNC: 5.8 GM/DL — LOW (ref 6–8.3)
RBC # BLD: 2.64 M/UL — LOW (ref 3.8–5.2)
RBC # FLD: 13.2 % — SIGNIFICANT CHANGE UP (ref 10.3–14.5)
SODIUM SERPL-SCNC: 136 MMOL/L — SIGNIFICANT CHANGE UP (ref 135–145)
WBC # BLD: 5 K/UL — SIGNIFICANT CHANGE UP (ref 3.8–10.5)
WBC # FLD AUTO: 5 K/UL — SIGNIFICANT CHANGE UP (ref 3.8–10.5)

## 2023-04-17 PROCEDURE — 99233 SBSQ HOSP IP/OBS HIGH 50: CPT

## 2023-04-17 RX ORDER — KETOROLAC TROMETHAMINE 30 MG/ML
15 SYRINGE (ML) INJECTION EVERY 8 HOURS
Refills: 0 | Status: DISCONTINUED | OUTPATIENT
Start: 2023-04-17 | End: 2023-04-18

## 2023-04-17 RX ORDER — I.V. FAT EMULSION 20 G/100ML
0.94 EMULSION INTRAVENOUS
Qty: 50 | Refills: 0 | Status: DISCONTINUED | OUTPATIENT
Start: 2023-04-17 | End: 2023-04-18

## 2023-04-17 RX ORDER — OXYCODONE HYDROCHLORIDE 5 MG/1
5 TABLET ORAL EVERY 4 HOURS
Refills: 0 | Status: DISCONTINUED | OUTPATIENT
Start: 2023-04-17 | End: 2023-04-21

## 2023-04-17 RX ORDER — ELECTROLYTE SOLUTION,INJ
1 VIAL (ML) INTRAVENOUS
Refills: 0 | Status: DISCONTINUED | OUTPATIENT
Start: 2023-04-17 | End: 2023-04-18

## 2023-04-17 RX ORDER — ACETAMINOPHEN 500 MG
1000 TABLET ORAL ONCE
Refills: 0 | Status: COMPLETED | OUTPATIENT
Start: 2023-04-17 | End: 2023-04-17

## 2023-04-17 RX ADMIN — Medication 15 MILLIGRAM(S): at 18:52

## 2023-04-17 RX ADMIN — ALVIMOPAN 12 MILLIGRAM(S): 12 CAPSULE ORAL at 21:33

## 2023-04-17 RX ADMIN — HYDROMORPHONE HYDROCHLORIDE 0.5 MILLIGRAM(S): 2 INJECTION INTRAMUSCULAR; INTRAVENOUS; SUBCUTANEOUS at 00:11

## 2023-04-17 RX ADMIN — Medication 15 MILLIGRAM(S): at 09:30

## 2023-04-17 RX ADMIN — FOSPHENYTOIN 104 MILLIGRAM(S) PE: 50 INJECTION INTRAMUSCULAR; INTRAVENOUS at 09:16

## 2023-04-17 RX ADMIN — OXYCODONE HYDROCHLORIDE 5 MILLIGRAM(S): 5 TABLET ORAL at 20:20

## 2023-04-17 RX ADMIN — Medication 400 MILLIGRAM(S): at 12:09

## 2023-04-17 RX ADMIN — PANTOPRAZOLE SODIUM 40 MILLIGRAM(S): 20 TABLET, DELAYED RELEASE ORAL at 09:16

## 2023-04-17 RX ADMIN — FOSPHENYTOIN 102 MILLIGRAM(S) PE: 50 INJECTION INTRAMUSCULAR; INTRAVENOUS at 18:51

## 2023-04-17 RX ADMIN — SODIUM CHLORIDE 25 MILLILITER(S): 9 INJECTION, SOLUTION INTRAVENOUS at 02:18

## 2023-04-17 RX ADMIN — HYDROMORPHONE HYDROCHLORIDE 0.5 MILLIGRAM(S): 2 INJECTION INTRAMUSCULAR; INTRAVENOUS; SUBCUTANEOUS at 05:43

## 2023-04-17 RX ADMIN — Medication 15 MILLIGRAM(S): at 09:15

## 2023-04-17 RX ADMIN — ALVIMOPAN 12 MILLIGRAM(S): 12 CAPSULE ORAL at 12:12

## 2023-04-17 RX ADMIN — Medication 1000 MILLIGRAM(S): at 12:39

## 2023-04-17 RX ADMIN — Medication 15 MILLIGRAM(S): at 19:00

## 2023-04-17 RX ADMIN — HEPARIN SODIUM 5000 UNIT(S): 5000 INJECTION INTRAVENOUS; SUBCUTANEOUS at 21:33

## 2023-04-17 RX ADMIN — Medication 1 TABLET(S): at 09:16

## 2023-04-17 RX ADMIN — HEPARIN SODIUM 5000 UNIT(S): 5000 INJECTION INTRAVENOUS; SUBCUTANEOUS at 12:13

## 2023-04-17 RX ADMIN — Medication 70 MICROGRAM(S): at 21:33

## 2023-04-17 RX ADMIN — HYDROMORPHONE HYDROCHLORIDE 0.5 MILLIGRAM(S): 2 INJECTION INTRAMUSCULAR; INTRAVENOUS; SUBCUTANEOUS at 05:55

## 2023-04-17 RX ADMIN — HYDROMORPHONE HYDROCHLORIDE 0.5 MILLIGRAM(S): 2 INJECTION INTRAMUSCULAR; INTRAVENOUS; SUBCUTANEOUS at 00:25
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
## 2023-04-17 NOTE — PROGRESS NOTE ADULT - SUBJECTIVE AND OBJECTIVE BOX
Patient was seen and examined at the bedside this morning  No acute events overnight  Pain is better controlled  No nausea or vomiting      Physical Exam:   Alert, conscious, oriented  No pallor, jaundice or cyanosis  Not in pain or distress  Chest clear, equal air entry bilaterally  Abdomen soft and lax, no tenderness, incisions clean and dry  Extremities: No swelling or tenderness    Vitals:  T(C): 37 ( @ 22:01), Max: 37.2 ( @ 09:19)  HR: 61 ( @ 04:00) (61 - 71)  BP: 150/62 ( @ 04:00) (127/70 - 163/62)  RR: 16 ( @ 04:00) (13 - 22)  SpO2: 97% ( @ 04:00) (95% - 100%)    04-15 @ 07:01  -   @ 07:00  --------------------------------------------------------  IN:    Fat Emulsion (Fish Oil &amp; Plant Based) 20% Infusion: 208 mL    Lactated Ringers: 250 mL    PPN (Peripheral Parenteral Nutrition): 750 mL  Total IN: 1208 mL    OUT:    Indwelling Catheter - Urethral (mL): 1300 mL  Total OUT: 1300 mL    Total NET: -92 mL       @ 07:01  -   @ 05:36  --------------------------------------------------------  IN:    IV PiggyBack: 500 mL    Lactated Ringers: 300 mL    PPN (Peripheral Parenteral Nutrition): 903.2 mL  Total IN: 1703.2 mL    OUT:    Indwelling Catheter - Urethral (mL): 1700 mL  Total OUT: 1700 mL    Total NET: 3.2 mL     @ 05:25                    8.8  CBC: 5.00>)-------(<228                     26.8                 - | - | -    CMP:  ----------------------< -               - | - | -                      Ca:-  Phos:-  Mg:-               -|      |-        LFTs:  ------|-|-----             -|      |-  04-16 @ 04:47                    8.3  CBC: 4.61>)-------(<194                     24.7                 138 | 106 | 11    CMP:  ----------------------< 146               3.8 | 28 | 0.35                      Ca:7.9  Phos:2.6  M.9               0.2|      |10        LFTs:  ------|65|-----             -|      |-      Current Inpatient Medications:  acetaminophen     Tablet .. 650 milliGRAM(s) Oral every 6 hours PRN  acetaminophen   IVPB .. 1000 milliGRAM(s) IV Intermittent once  alvimopan 12 milliGRAM(s) Oral two times a day  dextrose 5%. 1000 milliLiter(s) (50 mL/Hr) IV Continuous <Continuous>  dextrose 5%. 1000 milliLiter(s) (100 mL/Hr) IV Continuous <Continuous>  dextrose 50% Injectable 25 Gram(s) IV Push once  dextrose 50% Injectable 25 Gram(s) IV Push once  dextrose 50% Injectable 12.5 Gram(s) IV Push once  dextrose Oral Gel 15 Gram(s) Oral once PRN  fat emulsion (Fish Oil and Plant Based) 20% Infusion 0.96 Gm/kG/Day (20.83 mL/Hr) IV Continuous <Continuous>  fosphenytoin IVPB 100 milliGRAM(s) PE IV Intermittent <User Schedule>  fosphenytoin IVPB 50 milliGRAM(s) PE IV Intermittent <User Schedule>  glucagon  Injectable 1 milliGRAM(s) IntraMuscular once  heparin   Injectable 5000 Unit(s) SubCutaneous every 12 hours  HYDROmorphone  Injectable 0.5 milliGRAM(s) IV Push every 4 hours PRN  insulin lispro (ADMELOG) corrective regimen sliding scale   SubCutaneous every 6 hours  ketorolac   Injectable 15 milliGRAM(s) IV Push every 8 hours PRN  lactated ringers. 1000 milliLiter(s) (25 mL/Hr) IV Continuous <Continuous>  levothyroxine Injectable 70 MICROGram(s) IV Push at bedtime  multivitamin 1 Tablet(s) Oral daily  ondansetron Injectable 4 milliGRAM(s) IV Push every 6 hours PRN  pantoprazole  Injectable 40 milliGRAM(s) IV Push daily  Parenteral Nutrition - Adult 1 Each (75 mL/Hr) TPN Continuous <Continuous>

## 2023-04-17 NOTE — PROGRESS NOTE ADULT - ASSESSMENT
ASSESSMENT  83 year old female with PMHx seizures, hypothyroid, recently diagnosed with colon cancer presents for planned laparoscopic possible open right hemicolectomy, lymph node dissection, mobilization of splenic flexure  with Dr. Lopez.    S/P lap to open R hemicolectomy, extensive GUILLERMO with anastomosis to ileum 4/12  Large invasive tumor found intraop  EBL 200cc  POD#2    PLAN  - mentation intact. AAOx 3. pain control with IV tylenol and toradol. pt with local reaction to morphine and dilaudid making pt confused  - cont IV fosphenytoin 100mg in AM and 50mg PM for hx seizures  - BP elevated, suspect 2/2 to pain. cont to monitor   - on room air sating 95-97%. encourage incentive spirometer   - NPO with NGT. PPI. NGT as per surgery  - cont PPN  - Cr stable. monitor I & Os and electrolytes. straight cath x 1 overnight. cont bladder scan q6hr  - IV synthroid  - s/p post op cefotetan. monitor temps and trend CBC  - DVT ppx - hep sq. SCDs  PT eval    Dispo: SICU. IV fluids. NPO. NGT. PPN. pain control. PT. monitor bowel fxn     ASSESSMENT  83 year old female with PMHx seizures, hypothyroid, recently diagnosed with colon cancer presents for planned laparoscopic possible open right hemicolectomy, lymph node dissection, mobilization of splenic flexure  with Dr. Lopez.    S/P lap to open R hemicolectomy, extensive GUILLERMO with anastomosis to ileum 4/12  Large invasive tumor found intraop  EBL 200cc  POD#5    PLAN  - mentation intact. AAOx 3. pain control with IV tylenol and toradol.  - cont IV fosphenytoin 100mg in AM and 50mg PM for hx seizures  - BP controlled, suspect 2/2 to pain. cont to monitor   - on room air sating 95-97%. encourage incentive spirometer   - start clears as per surgery  - cont PPN with lipids  - Cr stable. monitor I & Os and electrolytes. franklin re-inserted over the weekend d/t retention. will have TOV again today. add flomax if tolerating PO  - IV synthroid  - s/p post op cefotetan. monitor temps and trend CBC  - DVT ppx - hep sq. SCDs  PT eval    Dispo: SICU. IV fluids.PPN. trial on clear liquids. pain control. PT. monitor bowel fxn    Will discuss with Dr. Mirza

## 2023-04-17 NOTE — PROGRESS NOTE ADULT - ASSESSMENT
An 83 year old female with ascending colon cancer  S/P open right hemicolectomy with small bowel resection POD 5    Patient seen and examined at bedside  Patient is NPO, denies N/V, denies belching  No bowel function at this time  Abdomen is soft, ND, AT    Plan:    Continue sips of water and ice chips, PPN  Follow up bowel function, s/p suppository for stimulation  May chew gum and have hard candy  Ensure patient is up and ambulating 2-3 times daily  Daily PT  Continue close supportive care    Plan discussed with Colorectal surgery team

## 2023-04-17 NOTE — CHART NOTE - NSCHARTNOTEFT_GEN_A_CORE
Clinical Nutrition PN Follow Up Note    * 83 year old female recently diagnosed with colon cancer presents to PST for planned laparoscopic possible open right hemicolectomy, lymph node dissection, mobilization of splenic flexure on 04/13/2023 with Dr. Lopez.    *PPN initiated 2/2 suspected prolonged NPO status    *current status: PPN day #5 and POD5, continues to be NPO and has NGT to LWS. Large invasive tumor found intraop. Follow up bowel function, s/p suppository for stimulation. Will c/w PPN - no changes to bag today.     *pertinent meds: admelog, LR, MVI, pantoprazole, hydromorphone, zofran     *labs reviewed; Lytes WNL. T Bili WNL for trace elements. POCTs WNL.  Last triglycerides WNL.     04-17    136  |  106  |  10  ----------------------------<  133<H>  4.4   |  25  |  0.32<L>    Ca    8.1<L>      17 Apr 2023 05:25  Phos  4.2     04-17  Mg     2.2     04-17    TPro  5.8<L>  /  Alb  2.1<L>  /  TBili  0.2  /  DBili  x   /  AST  16  /  ALT  16  /  AlkPhos  87  04-17    BMI: BMI (kg/m2): 18.8 (04-11-23 @ 10:57)  HbA1c: A1C with Estimated Average Glucose Result: 5.4 % (04-06-23 @ 15:27)    BP: 145/62 (04-17-23 @ 06:00) (109/49 - 163/62)  Lipid Panel: Date/Time: 04-14-23 @ 06:09  Triglycerides, Serum: 107    POCT Blood Glucose.: 128 mg/dL (17 Apr 2023 05:34)  POCT Blood Glucose.: 148 mg/dL (16 Apr 2023 23:28)  POCT Blood Glucose.: 122 mg/dL (16 Apr 2023 16:45)  POCT Blood Glucose.: 135 mg/dL (16 Apr 2023 11:53)    *I&O's Detail    16 Apr 2023 07:01  -  17 Apr 2023 07:00  --------------------------------------------------------  IN:    Fat Emulsion (Fish Oil &amp; Plant Based) 20% Infusion: 125 mL    IV PiggyBack: 850 mL    Lactated Ringers: 300 mL    PPN (Peripheral Parenteral Nutrition): 1728.2 mL  Total IN: 3003.2 mL    OUT:    Indwelling Catheter - Urethral (mL): 3600 mL  Total OUT: 3600 mL    Total NET: -596.8 mL  * fluid status: negative; will monitor/ adjust prn.  *last BM (+) on 4/11 - liquid, loose, light brown.   *mild edema noted    *malnutrition: Pt meets criteria for severe protein-calorie malnutrition in context of chronic disease r/t decreased ability to meet increased nutrient needs 2/2 cancer AEB severe muscle/ fat wasting    Estimated Needs: based on 56.2 Kg (wt from 4/13)  Calories: 5280-2027 Kcal (30-35 Kcal/Kg)  Protein:  g (1.5-2 g/Kg)  Fluids: 6115-6026 mL (30-35 mL/Kg)    Diet, NPO:   Except Medications  With Ice Chips/Sips of Water (04-12-23 @ 14:57) [Active]    Weight History:  Height (cm): 167.6 (04-11-23 @ 10:57), 167.6 (04-06-23 @ 15:13)  Weight (kg): 52.9 (04-11-23 @ 10:57), 53.1 (04-06-23 @ 17:03)  BMI (kg/m2): 18.8 (04-11-23 @ 10:57), 18.9 (04-06-23 @ 17:03)  BSA (m2): 1.59 (04-11-23 @ 10:57), 1.59 (04-06-23 @ 17:03)  IBW: 59.1kg   IBW %: 95.1%    PPN Recommendations: via Peripheral Line  Total Volume: 1800mL x 24 hours  65 g  Amino Acids  100 g Dextrose  0 g Lipids 20% (If TG <400, add 50g of lipids)  98 mEq Sodium Chloride  14 mEq Sodium Acetate  30 mmol Sodium Phosphate  30 mEq Potassium Chloride  16 mEq Potassium Acetate  25 mmol Potassium Phosphate  0 mEq Calcium Gluconate (CAPS out of PN solution)  12 mEq Magnesium Sulfate  200 mg Thiamine  1 ml Trace Elements  10 ml MVI    Total Calories   1100  kcals     ( Meet   61%  of  Estimated Energy needs  and   68%  Protein needs)  (osmolarity ~900)    Additional Recommendations:    1) Daily weights  2) Strict I & O's  3) Daily lyte checks including magnesium and phos  4) Weekly triglycerides/LFT checks  5) POCT q6hrs; maintain 140-180mg/dL  6) if on PN > 6 days, consider placing PICC line to meet 100% of nutr needs  7) Consider dc'ing IVF while on PN    *will continue to monitor and adjust PN prn*  Thu Castillo, MS, RDN, [facility], [phone]  Certified Nutrition
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
## 2023-04-18 LAB
ALBUMIN SERPL ELPH-MCNC: 2.2 G/DL — LOW (ref 3.3–5)
ALP SERPL-CCNC: 111 U/L — SIGNIFICANT CHANGE UP (ref 40–120)
ALT FLD-CCNC: 19 U/L — SIGNIFICANT CHANGE UP (ref 12–78)
ANION GAP SERPL CALC-SCNC: 6 MMOL/L — SIGNIFICANT CHANGE UP (ref 5–17)
AST SERPL-CCNC: 14 U/L — LOW (ref 15–37)
BILIRUB SERPL-MCNC: 0.2 MG/DL — SIGNIFICANT CHANGE UP (ref 0.2–1.2)
BUN SERPL-MCNC: 9 MG/DL — SIGNIFICANT CHANGE UP (ref 7–23)
CALCIUM SERPL-MCNC: 8.4 MG/DL — LOW (ref 8.5–10.1)
CHLORIDE SERPL-SCNC: 104 MMOL/L — SIGNIFICANT CHANGE UP (ref 96–108)
CO2 SERPL-SCNC: 26 MMOL/L — SIGNIFICANT CHANGE UP (ref 22–31)
CREAT SERPL-MCNC: 0.41 MG/DL — LOW (ref 0.5–1.3)
EGFR: 97 ML/MIN/1.73M2 — SIGNIFICANT CHANGE UP
GLUCOSE BLDC GLUCOMTR-MCNC: 106 MG/DL — HIGH (ref 70–99)
GLUCOSE BLDC GLUCOMTR-MCNC: 112 MG/DL — HIGH (ref 70–99)
GLUCOSE BLDC GLUCOMTR-MCNC: 123 MG/DL — HIGH (ref 70–99)
GLUCOSE BLDC GLUCOMTR-MCNC: 128 MG/DL — HIGH (ref 70–99)
GLUCOSE SERPL-MCNC: 150 MG/DL — HIGH (ref 70–99)
HCT VFR BLD CALC: 28.3 % — LOW (ref 34.5–45)
HGB BLD-MCNC: 9.5 G/DL — LOW (ref 11.5–15.5)
MAGNESIUM SERPL-MCNC: 2.1 MG/DL — SIGNIFICANT CHANGE UP (ref 1.6–2.6)
MCHC RBC-ENTMCNC: 33.5 PG — SIGNIFICANT CHANGE UP (ref 27–34)
MCHC RBC-ENTMCNC: 33.6 GM/DL — SIGNIFICANT CHANGE UP (ref 32–36)
MCV RBC AUTO: 99.6 FL — SIGNIFICANT CHANGE UP (ref 80–100)
PHOSPHATE SERPL-MCNC: 3.9 MG/DL — SIGNIFICANT CHANGE UP (ref 2.5–4.5)
PLATELET # BLD AUTO: 279 K/UL — SIGNIFICANT CHANGE UP (ref 150–400)
POTASSIUM SERPL-MCNC: 4.1 MMOL/L — SIGNIFICANT CHANGE UP (ref 3.5–5.3)
POTASSIUM SERPL-SCNC: 4.1 MMOL/L — SIGNIFICANT CHANGE UP (ref 3.5–5.3)
PROT SERPL-MCNC: 6.2 GM/DL — SIGNIFICANT CHANGE UP (ref 6–8.3)
RBC # BLD: 2.84 M/UL — LOW (ref 3.8–5.2)
RBC # FLD: 13.1 % — SIGNIFICANT CHANGE UP (ref 10.3–14.5)
SODIUM SERPL-SCNC: 136 MMOL/L — SIGNIFICANT CHANGE UP (ref 135–145)
SURGICAL PATHOLOGY STUDY: SIGNIFICANT CHANGE UP
WBC # BLD: 6.44 K/UL — SIGNIFICANT CHANGE UP (ref 3.8–10.5)
WBC # FLD AUTO: 6.44 K/UL — SIGNIFICANT CHANGE UP (ref 3.8–10.5)

## 2023-04-18 PROCEDURE — 99233 SBSQ HOSP IP/OBS HIGH 50: CPT

## 2023-04-18 RX ORDER — SIMETHICONE 80 MG/1
80 TABLET, CHEWABLE ORAL ONCE
Refills: 0 | Status: COMPLETED | OUTPATIENT
Start: 2023-04-18 | End: 2023-04-19

## 2023-04-18 RX ORDER — ACETAMINOPHEN 500 MG
1000 TABLET ORAL ONCE
Refills: 0 | Status: COMPLETED | OUTPATIENT
Start: 2023-04-18 | End: 2023-04-18

## 2023-04-18 RX ORDER — I.V. FAT EMULSION 20 G/100ML
0.94 EMULSION INTRAVENOUS
Qty: 50 | Refills: 0 | Status: DISCONTINUED | OUTPATIENT
Start: 2023-04-18 | End: 2023-04-19

## 2023-04-18 RX ORDER — ELECTROLYTE SOLUTION,INJ
1 VIAL (ML) INTRAVENOUS
Refills: 0 | Status: DISCONTINUED | OUTPATIENT
Start: 2023-04-18 | End: 2023-04-19

## 2023-04-18 RX ADMIN — OXYCODONE HYDROCHLORIDE 5 MILLIGRAM(S): 5 TABLET ORAL at 20:31

## 2023-04-18 RX ADMIN — FOSPHENYTOIN 102 MILLIGRAM(S) PE: 50 INJECTION INTRAMUSCULAR; INTRAVENOUS at 17:50

## 2023-04-18 RX ADMIN — OXYCODONE HYDROCHLORIDE 5 MILLIGRAM(S): 5 TABLET ORAL at 12:00

## 2023-04-18 RX ADMIN — OXYCODONE HYDROCHLORIDE 5 MILLIGRAM(S): 5 TABLET ORAL at 11:09

## 2023-04-18 RX ADMIN — FOSPHENYTOIN 104 MILLIGRAM(S) PE: 50 INJECTION INTRAMUSCULAR; INTRAVENOUS at 11:10

## 2023-04-18 RX ADMIN — OXYCODONE HYDROCHLORIDE 5 MILLIGRAM(S): 5 TABLET ORAL at 21:01

## 2023-04-18 RX ADMIN — ALVIMOPAN 12 MILLIGRAM(S): 12 CAPSULE ORAL at 23:47

## 2023-04-18 RX ADMIN — I.V. FAT EMULSION 20.83 GM/KG/DAY: 20 EMULSION INTRAVENOUS at 00:04

## 2023-04-18 RX ADMIN — Medication 400 MILLIGRAM(S): at 02:23

## 2023-04-18 RX ADMIN — Medication 1 TABLET(S): at 11:09

## 2023-04-18 RX ADMIN — ALVIMOPAN 12 MILLIGRAM(S): 12 CAPSULE ORAL at 13:31

## 2023-04-18 RX ADMIN — HEPARIN SODIUM 5000 UNIT(S): 5000 INJECTION INTRAVENOUS; SUBCUTANEOUS at 11:09

## 2023-04-18 RX ADMIN — Medication 1000 MILLIGRAM(S): at 04:05

## 2023-04-18 RX ADMIN — Medication 1 EACH: at 00:03

## 2023-04-18 RX ADMIN — OXYCODONE HYDROCHLORIDE 5 MILLIGRAM(S): 5 TABLET ORAL at 01:28

## 2023-04-18 RX ADMIN — HEPARIN SODIUM 5000 UNIT(S): 5000 INJECTION INTRAVENOUS; SUBCUTANEOUS at 23:47

## 2023-04-18 RX ADMIN — PANTOPRAZOLE SODIUM 40 MILLIGRAM(S): 20 TABLET, DELAYED RELEASE ORAL at 11:10

## 2023-04-18 RX ADMIN — Medication 15 MILLIGRAM(S): at 16:58

## 2023-04-18 NOTE — CHART NOTE - NSCHARTNOTEFT_GEN_A_CORE
Clinical Nutrition PN Follow Up Note    * 83 year old female recently diagnosed with colon cancer presents to PST for planned laparoscopic possible open right hemicolectomy, lymph node dissection, mobilization of splenic flexure on 04/13/2023 with Dr. Lopez.    *PPN initiated 2/2 suspected prolonged NPO status  *4/17: PPN day #5 and POD5, continues to be NPO and has NGT to LWS. Large invasive tumor found intraop. Follow up bowel function, s/p suppository for stimulation.     *current status: c/w PPN - labs stable, no changes.  On CLD, as per surgical resident, eating minimally and not tolerating well, will stay on clears. Pt feeling very weak. Reports having a BM but not passing flatus.     *pertinent meds: admelog, LR, MVI, pantoprazole, hydromorphone, zofran     *labs reviewed; Lytes WNL. T Bili WNL for trace elements. POCTs WNL.  Last triglyceride WNL.     04-18    136  |  104  |  9   ----------------------------<  150<H>  4.1   |  26  |  0.41<L>    Ca    8.4<L>      18 Apr 2023 05:37  Phos  3.9     04-18  Mg     2.1     04-18    TPro  6.2  /  Alb  2.2<L>  /  TBili  0.2  /  DBili  x   /  AST  14<L>  /  ALT  19  /  AlkPhos  111  04-18    BMI: BMI (kg/m2): 18.8 (04-11-23 @ 10:57)  HbA1c: A1C with Estimated Average Glucose Result: 5.4 % (04-06-23 @ 15:27)    BP: 145/62 (04-17-23 @ 06:00) (109/49 - 163/62)  Lipid Panel: Date/Time: 04-14-23 @ 06:09  Triglycerides, Serum: 107    POCT Blood Glucose.: 106 mg/dL (18 Apr 2023 00:34)  POCT Blood Glucose.: 123 mg/dL (17 Apr 2023 18:46)  POCT Blood Glucose.: 134 mg/dL (17 Apr 2023 12:02)    *I&O's Detail    17 Apr 2023 07:01  -  18 Apr 2023 07:00  --------------------------------------------------------  IN:    Fat Emulsion (Fish Oil &amp; Plant Based) 20% Infusion: 160 mL    IV PiggyBack: 100 mL    PPN (Peripheral Parenteral Nutrition): 840 mL  Total IN: 1100 mL    OUT:    Indwelling Catheter - Urethral (mL): 875 mL    Intermittent Catheterization - Urethral (mL): 2000 mL  Total OUT: 2875 mL    Total NET: -1775 mL  * fluid status: negative; will monitor/ adjust prn.  *last BM (+) on 4/16 - gas, discomfort, belching.   *mild edema noted    *malnutrition: Pt meets criteria for severe protein-calorie malnutrition in context of chronic disease r/t decreased ability to meet increased nutrient needs 2/2 cancer AEB severe muscle/ fat wasting    Estimated Needs: based on 56.2 Kg (wt from 4/13)  Calories: 0391-0882 Kcal (30-35 Kcal/Kg)  Protein:  g (1.5-2 g/Kg)  Fluids: 9754-4720 mL (30-35 mL/Kg)    Diet, Clear Liquid (04-17-23 @ 10:01) [Active]    Weight History:  Height (cm): 167.6 (04-11-23 @ 10:57), 167.6 (04-06-23 @ 15:13)  Weight (kg): 52.9 (04-11-23 @ 10:57), 53.1 (04-06-23 @ 17:03)  BMI (kg/m2): 18.8 (04-11-23 @ 10:57), 18.9 (04-06-23 @ 17:03)  BSA (m2): 1.59 (04-11-23 @ 10:57), 1.59 (04-06-23 @ 17:03)  IBW: 59.1kg   IBW %: 95.1%    **no changes to PPN**  PPN Recommendations: via Peripheral Line  Total Volume: 1800mL x 24 hours  65 g  Amino Acids  100 g Dextrose  0 g Lipids 20% (If TG <400, add 50g of lipids)  98 mEq Sodium Chloride  14 mEq Sodium Acetate  30 mmol Sodium Phosphate  30 mEq Potassium Chloride  16 mEq Potassium Acetate  25 mmol Potassium Phosphate  0 mEq Calcium Gluconate (CAPS out of PN solution)  12 mEq Magnesium Sulfate  200 mg Thiamine  1 ml Trace Elements  10 ml MVI    Total Calories   1100  kcals     ( Meet   61%  of  Estimated Energy needs  and   68%  Protein needs)  (osmolarity ~900)    Additional Recommendations:    1) Daily weights  2) Strict I & O's  3) Daily lyte checks including magnesium and phos  4) Weekly triglycerides/LFT checks  5) POCT q6hrs; maintain 140-180mg/dL  6) if on PN > 6 days, consider placing PICC line to meet 100% of nutr needs  7) Consider dc'ing IVF while on PN    *will continue to monitor and adjust PN prn*  Thu Castillo, MS, RDN, CNSC, [phone]  Certified Nutrition  Clinical Nutrition PN Follow Up Note    * 83 year old female recently diagnosed with colon cancer presents to PST for planned laparoscopic possible open right hemicolectomy, lymph node dissection, mobilization of splenic flexure on 04/13/2023 with Dr. Lopez.    *PPN initiated 2/2 suspected prolonged NPO status  *4/17: PPN day #5 and POD5, continues to be NPO and has NGT to LWS. Large invasive tumor found intraop. Follow up bowel function, s/p suppository for stimulation.     *current status: NGT removed; c/w PPN - labs stable, no changes.  On CLD, as per surgical resident, eating minimally and not tolerating well, will stay on clears. Pt feeling very weak. Reports having a BM but not passing flatus.     *pertinent meds: admelog, LR, MVI, pantoprazole, hydromorphone, zofran     *labs reviewed; Lytes WNL. T Bili WNL for trace elements. POCTs WNL.  Last triglyceride WNL.     04-18    136  |  104  |  9   ----------------------------<  150<H>  4.1   |  26  |  0.41<L>    Ca    8.4<L>      18 Apr 2023 05:37  Phos  3.9     04-18  Mg     2.1     04-18    TPro  6.2  /  Alb  2.2<L>  /  TBili  0.2  /  DBili  x   /  AST  14<L>  /  ALT  19  /  AlkPhos  111  04-18    BMI: BMI (kg/m2): 18.8 (04-11-23 @ 10:57)  HbA1c: A1C with Estimated Average Glucose Result: 5.4 % (04-06-23 @ 15:27)    BP: 145/62 (04-17-23 @ 06:00) (109/49 - 163/62)  Lipid Panel: Date/Time: 04-14-23 @ 06:09  Triglycerides, Serum: 107    POCT Blood Glucose.: 106 mg/dL (18 Apr 2023 00:34)  POCT Blood Glucose.: 123 mg/dL (17 Apr 2023 18:46)  POCT Blood Glucose.: 134 mg/dL (17 Apr 2023 12:02)    *I&O's Detail    17 Apr 2023 07:01  -  18 Apr 2023 07:00  --------------------------------------------------------  IN:    Fat Emulsion (Fish Oil &amp; Plant Based) 20% Infusion: 160 mL    IV PiggyBack: 100 mL    PPN (Peripheral Parenteral Nutrition): 840 mL  Total IN: 1100 mL    OUT:    Indwelling Catheter - Urethral (mL): 875 mL    Intermittent Catheterization - Urethral (mL): 2000 mL  Total OUT: 2875 mL    Total NET: -1775 mL  * fluid status: negative; will monitor/ adjust prn.  *last BM (+) on 4/16 - gas, discomfort, belching.   *mild edema noted    *malnutrition: Pt meets criteria for severe protein-calorie malnutrition in context of chronic disease r/t decreased ability to meet increased nutrient needs 2/2 cancer AEB severe muscle/ fat wasting    Estimated Needs: based on 56.2 Kg (wt from 4/13)  Calories: 4564-3074 Kcal (30-35 Kcal/Kg)  Protein:  g (1.5-2 g/Kg)  Fluids: 8336-2065 mL (30-35 mL/Kg)    Diet, Clear Liquid (04-17-23 @ 10:01) [Active]    Weight History:  Height (cm): 167.6 (04-11-23 @ 10:57), 167.6 (04-06-23 @ 15:13)  Weight (kg): 52.9 (04-11-23 @ 10:57), 53.1 (04-06-23 @ 17:03)  BMI (kg/m2): 18.8 (04-11-23 @ 10:57), 18.9 (04-06-23 @ 17:03)  BSA (m2): 1.59 (04-11-23 @ 10:57), 1.59 (04-06-23 @ 17:03)  IBW: 59.1kg   IBW %: 95.1%    **no changes to PPN**  PPN Recommendations: via Peripheral Line  Total Volume: 1800mL x 24 hours  65 g  Amino Acids  100 g Dextrose  0 g Lipids 20% (If TG <400, add 50g of lipids)  98 mEq Sodium Chloride  14 mEq Sodium Acetate  30 mmol Sodium Phosphate  30 mEq Potassium Chloride  16 mEq Potassium Acetate  25 mmol Potassium Phosphate  0 mEq Calcium Gluconate (CAPS out of PN solution)  12 mEq Magnesium Sulfate  200 mg Thiamine  1 ml Trace Elements  10 ml MVI    Total Calories   1100  kcals     ( Meet   61%  of  Estimated Energy needs  and   68%  Protein needs)  (osmolarity ~900)    Additional Recommendations:    1) Daily weights  2) Strict I & O's  3) Daily lyte checks including magnesium and phos  4) Weekly triglycerides/LFT checks  5) POCT q6hrs; maintain 140-180mg/dL  6) if on PN > 6 days, consider placing PICC line to meet 100% of nutr needs  7) Consider dc'ing IVF while on PN    *will continue to monitor and adjust PN prn*  Thu Castillo, MS, RDN, [facility], [phone]  Certified Nutrition  Clinical Nutrition PN Follow Up Note    * 83 year old female recently diagnosed with colon cancer presents to PST for planned laparoscopic possible open right hemicolectomy, lymph node dissection, mobilization of splenic flexure on 04/13/2023 with Dr. Lopez.    *PPN initiated 2/2 suspected prolonged NPO status  *4/17: PPN day #5 and POD5, continues to be NPO and has NGT to LWS. Large invasive tumor found intraop. Follow up bowel function, s/p suppository for stimulation.     *current status: NGT removed; c/w PPN - labs stable, no changes.  On CLD, as per surgical resident, eating minimally and not tolerating well, will stay on clears. Pt feeling very weak. Reports having a BM but not passing flatus.     *pertinent meds: admelog, LR, MVI, pantoprazole, hydromorphone, zofran     *labs reviewed; Lytes WNL. T Bili WNL for trace elements. POCTs WNL.  Last triglyceride WNL.     04-18    136  |  104  |  9   ----------------------------<  150<H>  4.1   |  26  |  0.41<L>    Ca    8.4<L>      18 Apr 2023 05:37  Phos  3.9     04-18  Mg     2.1     04-18    TPro  6.2  /  Alb  2.2<L>  /  TBili  0.2  /  DBili  x   /  AST  14<L>  /  ALT  19  /  AlkPhos  111  04-18    BMI: BMI (kg/m2): 18.8 (04-11-23 @ 10:57)  HbA1c: A1C with Estimated Average Glucose Result: 5.4 % (04-06-23 @ 15:27)    BP: 145/62 (04-17-23 @ 06:00) (109/49 - 163/62)  Lipid Panel: Date/Time: 04-14-23 @ 06:09  Triglycerides, Serum: 107    POCT Blood Glucose.: 106 mg/dL (18 Apr 2023 00:34)  POCT Blood Glucose.: 123 mg/dL (17 Apr 2023 18:46)  POCT Blood Glucose.: 134 mg/dL (17 Apr 2023 12:02)    *I&O's Detail    17 Apr 2023 07:01  -  18 Apr 2023 07:00  --------------------------------------------------------  IN:    Fat Emulsion (Fish Oil &amp; Plant Based) 20% Infusion: 160 mL    IV PiggyBack: 100 mL    PPN (Peripheral Parenteral Nutrition): 840 mL  Total IN: 1100 mL    OUT:    Indwelling Catheter - Urethral (mL): 875 mL    Intermittent Catheterization - Urethral (mL): 2000 mL  Total OUT: 2875 mL    Total NET: -1775 mL  * fluid status: negative; will monitor/ adjust prn.  *last BM (+) on 4/16 - gas, discomfort, belching.   *mild edema noted    *malnutrition: Pt meets criteria for severe protein-calorie malnutrition in context of chronic disease r/t decreased ability to meet increased nutrient needs 2/2 cancer AEB severe muscle/ fat wasting    Estimated Needs: based on 56.2 Kg (wt from 4/13)  Calories: 0341-9003 Kcal (30-35 Kcal/Kg)  Protein:  g (1.5-2 g/Kg)  Fluids: 4772-8093 mL (30-35 mL/Kg)    Diet, Clear Liquid (04-17-23 @ 10:01) [Active]    Weight History:  Height (cm): 167.6 (04-11-23 @ 10:57), 167.6 (04-06-23 @ 15:13)  Weight (kg): 52.9 (04-11-23 @ 10:57), 53.1 (04-06-23 @ 17:03)  BMI (kg/m2): 18.8 (04-11-23 @ 10:57), 18.9 (04-06-23 @ 17:03)  BSA (m2): 1.59 (04-11-23 @ 10:57), 1.59 (04-06-23 @ 17:03)  IBW: 59.1kg   IBW %: 95.1%    **no changes to PPN**  PPN Recommendations: via Peripheral Line  Total Volume: 1800mL x 24 hours  65 g  Amino Acids  100 g Dextrose  50 g Lipids 20%  98 mEq Sodium Chloride  14 mEq Sodium Acetate  30 mmol Sodium Phosphate  30 mEq Potassium Chloride  16 mEq Potassium Acetate  25 mmol Potassium Phosphate  0 mEq Calcium Gluconate (CAPS out of PN solution)  12 mEq Magnesium Sulfate  200 mg Thiamine  1 ml Trace Elements  10 ml MVI    Total Calories   1100  kcals     ( Meet   61%  of  Estimated Energy needs  and   68%  Protein needs)  (osmolarity ~900)    Additional Recommendations:    1) Daily weights  2) Strict I & O's  3) Daily lyte checks including magnesium and phos  4) Weekly triglycerides/LFT checks  5) POCT q6hrs; maintain 140-180mg/dL  6) if on PN > 6 days, consider placing PICC line to meet 100% of nutr needs  7) Consider dc'ing IVF while on PN    *will continue to monitor and adjust PN prn*  Thu Castillo MS, RDN, CNSC, [phone]  Certified Nutrition

## 2023-04-18 NOTE — PROGRESS NOTE ADULT - SUBJECTIVE AND OBJECTIVE BOX
SURGERY DAILY PROGRESS NOTE:     Subjective:  Patient seen and examined this AM at bedside. No acute events overnight and patient resting comfortably. Tolerating clears, but no bowel function yesterday. Young was removed yesterday? unable to void, straight cath x2, and overnight voided, however possible overactive bladder. Denies fever/chills, shortness of breath, chest pain. VS reviewed    Objective:    MEDICATIONS  (STANDING):  acetaminophen   IVPB .. 1000 milliGRAM(s) IV Intermittent once  alvimopan 12 milliGRAM(s) Oral two times a day  dextrose 5%. 1000 milliLiter(s) (50 mL/Hr) IV Continuous <Continuous>  dextrose 5%. 1000 milliLiter(s) (100 mL/Hr) IV Continuous <Continuous>  dextrose 50% Injectable 25 Gram(s) IV Push once  dextrose 50% Injectable 25 Gram(s) IV Push once  dextrose 50% Injectable 12.5 Gram(s) IV Push once  fat emulsion (Fish Oil and Plant Based) 20% Infusion 0.945 Gm/kG/Day (20.83 mL/Hr) IV Continuous <Continuous>  fosphenytoin IVPB 50 milliGRAM(s) PE IV Intermittent <User Schedule>  fosphenytoin IVPB 100 milliGRAM(s) PE IV Intermittent <User Schedule>  glucagon  Injectable 1 milliGRAM(s) IntraMuscular once  heparin   Injectable 5000 Unit(s) SubCutaneous every 12 hours  insulin lispro (ADMELOG) corrective regimen sliding scale   SubCutaneous every 6 hours  lactated ringers. 1000 milliLiter(s) (25 mL/Hr) IV Continuous <Continuous>  levothyroxine Injectable 70 MICROGram(s) IV Push at bedtime  multivitamin 1 Tablet(s) Oral daily  pantoprazole  Injectable 40 milliGRAM(s) IV Push daily  Parenteral Nutrition - Adult 1 Each (75 mL/Hr) TPN Continuous <Continuous>    MEDICATIONS  (PRN):  acetaminophen     Tablet .. 650 milliGRAM(s) Oral every 6 hours PRN Temp greater or equal to 38C (100.4F), Mild Pain (1 - 3)  dextrose Oral Gel 15 Gram(s) Oral once PRN Blood Glucose LESS THAN 70 milliGRAM(s)/deciliter  ketorolac   Injectable 15 milliGRAM(s) IV Push every 8 hours PRN Moderate Pain (4 - 6)  ondansetron Injectable 4 milliGRAM(s) IV Push every 6 hours PRN Nausea  oxyCODONE    IR 5 milliGRAM(s) Oral every 4 hours PRN Severe Pain (7 - 10)      Vital Signs Last 24 Hrs  T(C): 36.6 (18 Apr 2023 06:09), Max: 37 (17 Apr 2023 09:50)  T(F): 97.8 (18 Apr 2023 06:09), Max: 98.6 (17 Apr 2023 09:50)  HR: 73 (18 Apr 2023 06:00) (59 - 77)  BP: 152/95 (18 Apr 2023 06:00) (116/100 - 169/57)  BP(mean): 113 (18 Apr 2023 06:00) (79 - 132)  RR: 19 (18 Apr 2023 06:00) (15 - 22)  SpO2: 94% (18 Apr 2023 06:00) (94% - 100%)          PHYSICAL EXAM   GENERAL: NAD, AOx3, well developed  HEAD: Atraumatic, normocephalic  EYES: EOMI, PERRLA, conjunctiva and sclera clear  ENT: moist mucous membrane  NECK: supple, No JVD, midline trachea  CHEST/LUNG: unlabored respirations  Heart: S1, S2 normal  ABDOMEN: soft, nondistended, tenderness to palpation at right and midline  NERVOUS SYSTEM: AOx3, speech clear, no neuro-deficits  MSK: full ROM, no deformities  SKIN: warm to touch, no rash or lesions      I&O's Detail    17 Apr 2023 07:01  -  18 Apr 2023 07:00  --------------------------------------------------------  IN:    Fat Emulsion (Fish Oil &amp; Plant Based) 20% Infusion: 160 mL    IV PiggyBack: 100 mL    PPN (Peripheral Parenteral Nutrition): 840 mL  Total IN: 1100 mL    OUT:    Indwelling Catheter - Urethral (mL): 875 mL    Intermittent Catheterization - Urethral (mL): 2000 mL  Total OUT: 2875 mL    Total NET: -1775 mL          Daily     Daily     LABS:                        9.5    6.44  )-----------( 279      ( 18 Apr 2023 05:37 )             28.3     04-18    136  |  104  |  9   ----------------------------<  150<H>  4.1   |  26  |  0.41<L>    Ca    8.4<L>      18 Apr 2023 05:37  Phos  3.9     04-18  Mg     2.1     04-18    TPro  6.2  /  Alb  2.2<L>  /  TBili  0.2  /  DBili  x   /  AST  14<L>  /  ALT  19  /  AlkPhos  111  04-18          RADIOLOGY & ADDITIONAL STUDIES:

## 2023-04-18 NOTE — PROGRESS NOTE ADULT - ASSESSMENT
83 year old female with ascending colon cancer  S/P open right hemicolectomy with small bowel resection POD6    Plan:  pain control prn  monitor VS  Continue clears  monitor bowel function  post voidal bladder scan. may need franklin back  encourage IS/OOB/PT  Continue close supportive care    Plan discussed with Colorectal surgery team

## 2023-04-19 LAB
ALBUMIN SERPL ELPH-MCNC: 2.2 G/DL — LOW (ref 3.3–5)
ALP SERPL-CCNC: 121 U/L — HIGH (ref 40–120)
ALT FLD-CCNC: 17 U/L — SIGNIFICANT CHANGE UP (ref 12–78)
ANION GAP SERPL CALC-SCNC: 5 MMOL/L — SIGNIFICANT CHANGE UP (ref 5–17)
AST SERPL-CCNC: 14 U/L — LOW (ref 15–37)
BILIRUB SERPL-MCNC: 0.2 MG/DL — SIGNIFICANT CHANGE UP (ref 0.2–1.2)
BUN SERPL-MCNC: 10 MG/DL — SIGNIFICANT CHANGE UP (ref 7–23)
CALCIUM SERPL-MCNC: 8.4 MG/DL — LOW (ref 8.5–10.1)
CHLORIDE SERPL-SCNC: 106 MMOL/L — SIGNIFICANT CHANGE UP (ref 96–108)
CO2 SERPL-SCNC: 25 MMOL/L — SIGNIFICANT CHANGE UP (ref 22–31)
CREAT SERPL-MCNC: 0.38 MG/DL — LOW (ref 0.5–1.3)
EGFR: 99 ML/MIN/1.73M2 — SIGNIFICANT CHANGE UP
GLUCOSE BLDC GLUCOMTR-MCNC: 105 MG/DL — HIGH (ref 70–99)
GLUCOSE BLDC GLUCOMTR-MCNC: 127 MG/DL — HIGH (ref 70–99)
GLUCOSE BLDC GLUCOMTR-MCNC: 130 MG/DL — HIGH (ref 70–99)
GLUCOSE BLDC GLUCOMTR-MCNC: 155 MG/DL — HIGH (ref 70–99)
GLUCOSE SERPL-MCNC: 122 MG/DL — HIGH (ref 70–99)
HCT VFR BLD CALC: 26 % — LOW (ref 34.5–45)
HGB BLD-MCNC: 8.9 G/DL — LOW (ref 11.5–15.5)
MAGNESIUM SERPL-MCNC: 2.1 MG/DL — SIGNIFICANT CHANGE UP (ref 1.6–2.6)
MCHC RBC-ENTMCNC: 34 PG — SIGNIFICANT CHANGE UP (ref 27–34)
MCHC RBC-ENTMCNC: 34.2 GM/DL — SIGNIFICANT CHANGE UP (ref 32–36)
MCV RBC AUTO: 99.2 FL — SIGNIFICANT CHANGE UP (ref 80–100)
PHOSPHATE SERPL-MCNC: 3.7 MG/DL — SIGNIFICANT CHANGE UP (ref 2.5–4.5)
PLATELET # BLD AUTO: 282 K/UL — SIGNIFICANT CHANGE UP (ref 150–400)
POTASSIUM SERPL-MCNC: 4.2 MMOL/L — SIGNIFICANT CHANGE UP (ref 3.5–5.3)
POTASSIUM SERPL-SCNC: 4.2 MMOL/L — SIGNIFICANT CHANGE UP (ref 3.5–5.3)
PROT SERPL-MCNC: 6.3 GM/DL — SIGNIFICANT CHANGE UP (ref 6–8.3)
RBC # BLD: 2.62 M/UL — LOW (ref 3.8–5.2)
RBC # FLD: 13.4 % — SIGNIFICANT CHANGE UP (ref 10.3–14.5)
SODIUM SERPL-SCNC: 136 MMOL/L — SIGNIFICANT CHANGE UP (ref 135–145)
TRIGL SERPL-MCNC: 119 MG/DL — SIGNIFICANT CHANGE UP
WBC # BLD: 5.76 K/UL — SIGNIFICANT CHANGE UP (ref 3.8–10.5)
WBC # FLD AUTO: 5.76 K/UL — SIGNIFICANT CHANGE UP (ref 3.8–10.5)

## 2023-04-19 PROCEDURE — 74018 RADEX ABDOMEN 1 VIEW: CPT | Mod: 26

## 2023-04-19 RX ORDER — I.V. FAT EMULSION 20 G/100ML
0.94 EMULSION INTRAVENOUS
Qty: 50 | Refills: 0 | Status: DISCONTINUED | OUTPATIENT
Start: 2023-04-19 | End: 2023-04-19

## 2023-04-19 RX ORDER — ELECTROLYTE SOLUTION,INJ
1 VIAL (ML) INTRAVENOUS
Refills: 0 | Status: DISCONTINUED | OUTPATIENT
Start: 2023-04-19 | End: 2023-04-19

## 2023-04-19 RX ADMIN — OXYCODONE HYDROCHLORIDE 5 MILLIGRAM(S): 5 TABLET ORAL at 09:37

## 2023-04-19 RX ADMIN — HEPARIN SODIUM 5000 UNIT(S): 5000 INJECTION INTRAVENOUS; SUBCUTANEOUS at 10:42

## 2023-04-19 RX ADMIN — I.V. FAT EMULSION 20.83 GM/KG/DAY: 20 EMULSION INTRAVENOUS at 01:00

## 2023-04-19 RX ADMIN — OXYCODONE HYDROCHLORIDE 5 MILLIGRAM(S): 5 TABLET ORAL at 18:29

## 2023-04-19 RX ADMIN — Medication 70 MICROGRAM(S): at 01:02

## 2023-04-19 RX ADMIN — HEPARIN SODIUM 5000 UNIT(S): 5000 INJECTION INTRAVENOUS; SUBCUTANEOUS at 22:29

## 2023-04-19 RX ADMIN — OXYCODONE HYDROCHLORIDE 5 MILLIGRAM(S): 5 TABLET ORAL at 12:23

## 2023-04-19 RX ADMIN — FOSPHENYTOIN 104 MILLIGRAM(S) PE: 50 INJECTION INTRAMUSCULAR; INTRAVENOUS at 10:42

## 2023-04-19 RX ADMIN — ONDANSETRON 4 MILLIGRAM(S): 8 TABLET, FILM COATED ORAL at 19:20

## 2023-04-19 RX ADMIN — ALVIMOPAN 12 MILLIGRAM(S): 12 CAPSULE ORAL at 10:42

## 2023-04-19 RX ADMIN — Medication 1 TABLET(S): at 10:42

## 2023-04-19 RX ADMIN — Medication 1 EACH: at 00:59

## 2023-04-19 RX ADMIN — SODIUM CHLORIDE 25 MILLILITER(S): 9 INJECTION, SOLUTION INTRAVENOUS at 01:29

## 2023-04-19 RX ADMIN — OXYCODONE HYDROCHLORIDE 5 MILLIGRAM(S): 5 TABLET ORAL at 08:44

## 2023-04-19 RX ADMIN — Medication 70 MICROGRAM(S): at 22:29

## 2023-04-19 RX ADMIN — FOSPHENYTOIN 102 MILLIGRAM(S) PE: 50 INJECTION INTRAMUSCULAR; INTRAVENOUS at 18:29

## 2023-04-19 RX ADMIN — PANTOPRAZOLE SODIUM 40 MILLIGRAM(S): 20 TABLET, DELAYED RELEASE ORAL at 10:42

## 2023-04-19 NOTE — PROGRESS NOTE ADULT - ASSESSMENT
83 year old female with ascending colon cancer  S/P open right hemicolectomy with small bowel resection POD7  Had 1 BM, passing gas     Plan:  pain control prn  monitor VS  FLD  possible LRD later today  monitor bowel function  encourage IS/OOB/PT  Continue close supportive care  CM for dispo planning    Plan discussed with Colorectal surgery team

## 2023-04-19 NOTE — PROGRESS NOTE ADULT - SUBJECTIVE AND OBJECTIVE BOX
SURGERY DAILY PROGRESS NOTE:     Subjective:  Patient seen and examined this AM at bedside.   No acute events overnight and patient resting comfortably.   Tolerating FLD, but no bowel function yesterday.   Denies fever/chills, shortness of breath, chest pain. VS reviewed    PHYSICAL EXAM   GENERAL: NAD, AOx3, well developed  HEAD: Atraumatic, normocephalic  EYES: EOMI, PERRLA, conjunctiva and sclera clear  ENT: moist mucous membrane  NECK: supple, No JVD, midline trachea  CHEST/LUNG: unlabored respirations  Heart: S1, S2 normal  ABDOMEN: soft, nondistended, tenderness to palpation at right and midline  NERVOUS SYSTEM: AOx3, speech clear, no neuro-deficits  MSK: full ROM, no deformities  SKIN: warm to touch, no rash or lesions    Vitals:  T(C): 36.3 ( @ 00:21), Max: 36.9 ( @ 17:13)  HR: 71 ( @ 02:33) (62 - 71)  BP: 147/49 ( @ 02:33) (147/49 - 184/64)  RR: 17 ( @ 02:33) (11 - 18)  SpO2: 95% ( @ 02:33) (90% - 100%)     @ 07:01  -   @ 07:00  --------------------------------------------------------  IN:    Lactated Ringers: 1000 mL    PPN (Peripheral Parenteral Nutrition): 850 mL  Total IN: 1850 mL    OUT:    Voided (mL): 500 mL  Total OUT: 500 mL    Total NET: 1350 mL     @ 06:52                    8.9  CBC: 5.76>)-------(<282                     26.0                 136 | 106 | 10    CMP:  ----------------------< 122               4.2 | 25 | 0.38                      Ca:8.4  Phos:3.7  M.1               0.2|      |14        LFTs:  ------|121|-----             -|      |-  04-18 @ 05:37                    9.5  CBC: 6.44>)-------(<279                     28.3                 136 | 104 | 9    CMP:  ----------------------< 150               4.1 | 26 | 0.41                      Ca:8.4  Phos:3.9  M.1               0.2|      |14        LFTs:  ------|111|-----             -|      |-      Current Inpatient Medications:  acetaminophen     Tablet .. 650 milliGRAM(s) Oral every 6 hours PRN  acetaminophen   IVPB .. 1000 milliGRAM(s) IV Intermittent once  alvimopan 12 milliGRAM(s) Oral two times a day  dextrose 5%. 1000 milliLiter(s) (100 mL/Hr) IV Continuous <Continuous>  dextrose 5%. 1000 milliLiter(s) (50 mL/Hr) IV Continuous <Continuous>  dextrose 50% Injectable 25 Gram(s) IV Push once  dextrose 50% Injectable 12.5 Gram(s) IV Push once  dextrose 50% Injectable 25 Gram(s) IV Push once  dextrose Oral Gel 15 Gram(s) Oral once PRN  fat emulsion (Fish Oil and Plant Based) 20% Infusion 0.945 Gm/kG/Day (20.83 mL/Hr) IV Continuous <Continuous>  fosphenytoin IVPB 100 milliGRAM(s) PE IV Intermittent <User Schedule>  fosphenytoin IVPB 50 milliGRAM(s) PE IV Intermittent <User Schedule>  glucagon  Injectable 1 milliGRAM(s) IntraMuscular once  heparin   Injectable 5000 Unit(s) SubCutaneous every 12 hours  insulin lispro (ADMELOG) corrective regimen sliding scale   SubCutaneous every 6 hours  lactated ringers. 1000 milliLiter(s) (25 mL/Hr) IV Continuous <Continuous>  levothyroxine Injectable 70 MICROGram(s) IV Push at bedtime  multivitamin 1 Tablet(s) Oral daily  ondansetron Injectable 4 milliGRAM(s) IV Push every 6 hours PRN  oxyCODONE    IR 5 milliGRAM(s) Oral every 4 hours PRN  pantoprazole  Injectable 40 milliGRAM(s) IV Push daily  Parenteral Nutrition - Adult 1 Each (75 mL/Hr) TPN Continuous <Continuous>

## 2023-04-19 NOTE — CHART NOTE - NSCHARTNOTEFT_GEN_A_CORE
Clinical Nutrition PN Follow Up Note    * 83 year old female recently diagnosed with colon cancer presents to PST for planned laparoscopic possible open right hemicolectomy, lymph node dissection, mobilization of splenic flexure on 04/13/2023 with Dr. Lopez.    *PPN initiated 2/2 suspected prolonged NPO status  *4/17: PPN day #5 and POD5, continues to be NPO and has NGT to LWS. Large invasive tumor found intraop. Follow up bowel function, s/p suppository for stimulation.     *current status: NGT removed; c/w PPN - labs stable, no changes.  On CLD, as per surgical resident, eating minimally and not tolerating well, will stay on clears. Pt feeling very weak. Reports having a BM but not passing flatus.     *pertinent meds: admelog, LR, MVI, pantoprazole, hydromorphone, zofran     *labs reviewed; Lytes WNL. T Bili WNL for trace elements. POCTs WNL.  Last triglyceride WNL.     04-18    136  |  104  |  9   ----------------------------<  150<H>  4.1   |  26  |  0.41<L>    Ca    8.4<L>      18 Apr 2023 05:37  Phos  3.9     04-18  Mg     2.1     04-18    TPro  6.2  /  Alb  2.2<L>  /  TBili  0.2  /  DBili  x   /  AST  14<L>  /  ALT  19  /  AlkPhos  111  04-18    BMI: BMI (kg/m2): 18.8 (04-11-23 @ 10:57)  HbA1c: A1C with Estimated Average Glucose Result: 5.4 % (04-06-23 @ 15:27)    BP: 145/62 (04-17-23 @ 06:00) (109/49 - 163/62)  Lipid Panel: Date/Time: 04-14-23 @ 06:09  Triglycerides, Serum: 107    POCT Blood Glucose.: 106 mg/dL (18 Apr 2023 00:34)  POCT Blood Glucose.: 123 mg/dL (17 Apr 2023 18:46)  POCT Blood Glucose.: 134 mg/dL (17 Apr 2023 12:02)    *I&O's Detail    17 Apr 2023 07:01  -  18 Apr 2023 07:00  --------------------------------------------------------  IN:    Fat Emulsion (Fish Oil &amp; Plant Based) 20% Infusion: 160 mL    IV PiggyBack: 100 mL    PPN (Peripheral Parenteral Nutrition): 840 mL  Total IN: 1100 mL    OUT:    Indwelling Catheter - Urethral (mL): 875 mL    Intermittent Catheterization - Urethral (mL): 2000 mL  Total OUT: 2875 mL    Total NET: -1775 mL  * fluid status: negative; will monitor/ adjust prn.  *last BM (+) on 4/16 - gas, discomfort, belching.   *mild edema noted    *malnutrition: Pt meets criteria for severe protein-calorie malnutrition in context of chronic disease r/t decreased ability to meet increased nutrient needs 2/2 cancer AEB severe muscle/ fat wasting    Estimated Needs: based on 56.2 Kg (wt from 4/13)  Calories: 8598-0488 Kcal (30-35 Kcal/Kg)  Protein:  g (1.5-2 g/Kg)  Fluids: 6133-1599 mL (30-35 mL/Kg)    Diet, Clear Liquid (04-17-23 @ 10:01) [Active]    Weight History:  Height (cm): 167.6 (04-11-23 @ 10:57), 167.6 (04-06-23 @ 15:13)  Weight (kg): 52.9 (04-11-23 @ 10:57), 53.1 (04-06-23 @ 17:03)  BMI (kg/m2): 18.8 (04-11-23 @ 10:57), 18.9 (04-06-23 @ 17:03)  BSA (m2): 1.59 (04-11-23 @ 10:57), 1.59 (04-06-23 @ 17:03)  IBW: 59.1kg   IBW %: 95.1%    **no changes to PPN**  PPN Recommendations: via Peripheral Line  Total Volume: 1800mL x 24 hours  65 g  Amino Acids  100 g Dextrose  50 g Lipids 20%  98 mEq Sodium Chloride  14 mEq Sodium Acetate  30 mmol Sodium Phosphate  30 mEq Potassium Chloride  16 mEq Potassium Acetate  25 mmol Potassium Phosphate  0 mEq Calcium Gluconate (CAPS out of PN solution)  12 mEq Magnesium Sulfate  200 mg Thiamine  1 ml Trace Elements  10 ml MVI    Total Calories   1100  kcals     ( Meet   61%  of  Estimated Energy needs  and   68%  Protein needs)  (osmolarity ~900)    Additional Recommendations:    1) Daily weights  2) Strict I & O's  3) Daily lyte checks including magnesium and phos  4) Weekly triglycerides/LFT checks  5) POCT q6hrs; maintain 140-180mg/dL  6) if on PN > 6 days, consider placing PICC line to meet 100% of nutr needs  7) Consider dc'ing IVF while on PN    *will continue to monitor and adjust PN prn*  Thu Castillo MS, RDN, CNSC, [phone]  Certified Nutrition  Clinical Nutrition PN Follow Up Note    * 83 year old female recently diagnosed with colon cancer presents to PST for planned laparoscopic possible open right hemicolectomy, lymph node dissection, mobilization of splenic flexure on 04/13/2023 with Dr. Lopez.    *PPN initiated 2/2 suspected prolonged NPO status  *4/17: PPN day #5 and POD5, continues to be NPO and has NGT to LWS. Large invasive tumor found intraop. Follow up bowel function, s/p suppository for stimulation.   *4/18: NGT removed; c/w PPN - labs stable, no changes.  On CLD, as per surgical resident, eating minimally and not tolerating well, will stay on clears. Pt feeling very weak. Reports having a BM but not passing flatus.     *current status: tolerated FLD well as per surg resident. Having bowel function. Plan to advance to low fiber today; will c/w PPN one more day. Lytes stable, no chnages to PN bag today.    *pertinent meds: admelog, LR, MVI, pantoprazole, hydromorphone, zofran     *labs reviewed; Lytes WNL. T Bili WNL for trace elements. POCTs WNL.  Last triglyceride WNL.     04-19    136  |  106  |  10  ----------------------------<  122<H>  4.2   |  25  |  0.38<L>    Ca    8.4<L>      19 Apr 2023 06:52  Phos  3.7     04-19  Mg     2.1     04-19    TPro  6.3  /  Alb  2.2<L>  /  TBili  0.2  /  DBili  x   /  AST  14<L>  /  ALT  17  /  AlkPhos  121<H>  04-19    BMI: BMI (kg/m2): 18.8 (04-11-23 @ 10:57)  HbA1c: A1C with Estimated Average Glucose Result: 5.4 % (04-06-23 @ 15:27)    BP: 145/62 (04-17-23 @ 06:00) (109/49 - 163/62)  Lipid Panel: Date/Time: 04-14-23 @ 06:09  Triglycerides, Serum: 107    POCT Blood Glucose.: 130 mg/dL (19 Apr 2023 06:27)  POCT Blood Glucose.: 128 mg/dL (18 Apr 2023 23:53)  POCT Blood Glucose.: 112 mg/dL (18 Apr 2023 17:45)  POCT Blood Glucose.: 123 mg/dL (18 Apr 2023 12:25)    *I&O's Detail    18 Apr 2023 07:01  -  19 Apr 2023 07:00  --------------------------------------------------------  IN:    Lactated Ringers: 1000 mL    PPN (Peripheral Parenteral Nutrition): 850 mL  Total IN: 1850 mL    OUT:    Voided (mL): 500 mL  Total OUT: 500 mL    Total NET: 1350 mL  * fluid status: positive; will monitor/ adjust prn.  *last BM (+) on 4/18 - passing flatus, abd discomfort.   *mild edema noted on 4/16    *malnutrition: Pt continues to meet criteria for severe protein-calorie malnutrition in context of chronic disease r/t decreased ability to meet increased nutrient needs 2/2 cancer AEB severe muscle/ fat wasting    Estimated Needs: based on 56.2 Kg (wt from 4/13)  Calories: 0174-1366 Kcal (30-35 Kcal/Kg)  Protein:  g (1.5-2 g/Kg)  Fluids: 3461-2643 mL (30-35 mL/Kg)    Diet, Low Fiber (04-19-23 @ 09:49) [Active]    Weight History:  Height (cm): 167.6 (04-11-23 @ 10:57), 167.6 (04-06-23 @ 15:13)  Weight (kg): 52.9 (04-11-23 @ 10:57), 53.1 (04-06-23 @ 17:03)  BMI (kg/m2): 18.8 (04-11-23 @ 10:57), 18.9 (04-06-23 @ 17:03)  BSA (m2): 1.59 (04-11-23 @ 10:57), 1.59 (04-06-23 @ 17:03)  IBW: 59.1kg   IBW %: 95.1%    **no changes to PPN**  PPN Recommendations: via Peripheral Line  Total Volume: 1800mL x 24 hours  65 g  Amino Acids  100 g Dextrose  50 g Lipids 20%  98 mEq Sodium Chloride  14 mEq Sodium Acetate  30 mmol Sodium Phosphate  30 mEq Potassium Chloride  16 mEq Potassium Acetate  25 mmol Potassium Phosphate  0 mEq Calcium Gluconate (CAPS out of PN solution)  12 mEq Magnesium Sulfate  200 mg Thiamine  1 ml Trace Elements  10 ml MVI    Total Calories   1100  kcals     ( Meet   61%  of  Estimated Energy needs  and   68%  Protein needs)  (osmolarity ~900)    Additional Recommendations:    1) Daily weights  2) Strict I & O's  3) Daily lyte checks including magnesium and phos  4) Weekly triglycerides/LFT checks  5) POCT q6hrs; maintain 140-180mg/dL  6) D/c PPN after today and add premier protein shakes BID to optimize nutr status    *will continue to monitor and adjust PN prn*  Thu Castillo, MS, RDN, CNSC, [phone]  Certified Nutrition  Clinical Nutrition PN Follow Up Note    * 83 year old female recently diagnosed with colon cancer presents to PST for planned laparoscopic possible open right hemicolectomy, lymph node dissection, mobilization of splenic flexure on 04/13/2023 with Dr. Lopez.    *PPN initiated 2/2 suspected prolonged NPO status  *4/17: PPN day #5 and POD5, continues to be NPO and has NGT to LWS. Large invasive tumor found intraop. Follow up bowel function, s/p suppository for stimulation.   *4/18: NGT removed; c/w PPN - labs stable, no changes.  On CLD, as per surgical resident, eating minimally and not tolerating well, will stay on clears. Pt feeling very weak. Reports having a BM but not passing flatus.     *current status: transferred to  from SICU.   tolerated FLD well as per surg resident. Having bowel function. Plan to advance to low fiber today; will c/w PPN one more day. Lytes stable, no chnages to PN bag today.    *pertinent meds: admelog, LR, MVI, pantoprazole, hydromorphone, zofran     *labs reviewed; Lytes WNL. T Bili WNL for trace elements. POCTs WNL.  Last triglyceride WNL.     04-19    136  |  106  |  10  ----------------------------<  122<H>  4.2   |  25  |  0.38<L>    Ca    8.4<L>      19 Apr 2023 06:52  Phos  3.7     04-19  Mg     2.1     04-19    TPro  6.3  /  Alb  2.2<L>  /  TBili  0.2  /  DBili  x   /  AST  14<L>  /  ALT  17  /  AlkPhos  121<H>  04-19    BMI: BMI (kg/m2): 18.8 (04-11-23 @ 10:57)  HbA1c: A1C with Estimated Average Glucose Result: 5.4 % (04-06-23 @ 15:27)    BP: 145/62 (04-17-23 @ 06:00) (109/49 - 163/62)  Lipid Panel: Date/Time: 04-14-23 @ 06:09  Triglycerides, Serum: 107    POCT Blood Glucose.: 130 mg/dL (19 Apr 2023 06:27)  POCT Blood Glucose.: 128 mg/dL (18 Apr 2023 23:53)  POCT Blood Glucose.: 112 mg/dL (18 Apr 2023 17:45)  POCT Blood Glucose.: 123 mg/dL (18 Apr 2023 12:25)    *I&O's Detail    18 Apr 2023 07:01  -  19 Apr 2023 07:00  --------------------------------------------------------  IN:    Lactated Ringers: 1000 mL    PPN (Peripheral Parenteral Nutrition): 850 mL  Total IN: 1850 mL    OUT:    Voided (mL): 500 mL  Total OUT: 500 mL    Total NET: 1350 mL  * fluid status: positive; will monitor/ adjust prn.  *last BM (+) on 4/18 - passing flatus, abd discomfort.   *mild edema noted on 4/16    *malnutrition: Pt continues to meet criteria for severe protein-calorie malnutrition in context of chronic disease r/t decreased ability to meet increased nutrient needs 2/2 cancer AEB severe muscle/ fat wasting    Estimated Needs: based on 56.2 Kg (wt from 4/13)  Calories: 4365-0974 Kcal (30-35 Kcal/Kg)  Protein:  g (1.5-2 g/Kg)  Fluids: 8249-0022 mL (30-35 mL/Kg)    Diet, Low Fiber (04-19-23 @ 09:49) [Active]    Weight History:  Height (cm): 167.6 (04-11-23 @ 10:57), 167.6 (04-06-23 @ 15:13)  Weight (kg): 52.9 (04-11-23 @ 10:57), 53.1 (04-06-23 @ 17:03)  BMI (kg/m2): 18.8 (04-11-23 @ 10:57), 18.9 (04-06-23 @ 17:03)  BSA (m2): 1.59 (04-11-23 @ 10:57), 1.59 (04-06-23 @ 17:03)  IBW: 59.1kg   IBW %: 95.1%    **no changes to PPN**  PPN Recommendations: via Peripheral Line  Total Volume: 1800mL x 24 hours  65 g  Amino Acids  100 g Dextrose  50 g Lipids 20%  98 mEq Sodium Chloride  14 mEq Sodium Acetate  30 mmol Sodium Phosphate  30 mEq Potassium Chloride  16 mEq Potassium Acetate  25 mmol Potassium Phosphate  0 mEq Calcium Gluconate (CAPS out of PN solution)  12 mEq Magnesium Sulfate  200 mg Thiamine  1 ml Trace Elements  10 ml MVI    Total Calories   1100  kcals     ( Meet   61%  of  Estimated Energy needs  and   68%  Protein needs)  (osmolarity ~900)    Additional Recommendations:    1) Daily weights  2) Strict I & O's  3) Daily lyte checks including magnesium and phos  4) Weekly triglycerides/LFT checks  5) POCT q6hrs; maintain 140-180mg/dL  6) D/c PPN after today and add premier protein shakes BID to optimize nutr status    *will continue to monitor and adjust PN prn*  Thu Castillo, MS, RDN, CNSC, [phone]  Certified Nutrition

## 2023-04-20 ENCOUNTER — TRANSCRIPTION ENCOUNTER (OUTPATIENT)
Age: 83
End: 2023-04-20

## 2023-04-20 LAB
ALBUMIN SERPL ELPH-MCNC: 2.2 G/DL — LOW (ref 3.3–5)
ALP SERPL-CCNC: 131 U/L — HIGH (ref 40–120)
ALT FLD-CCNC: 20 U/L — SIGNIFICANT CHANGE UP (ref 12–78)
ANION GAP SERPL CALC-SCNC: 3 MMOL/L — LOW (ref 5–17)
AST SERPL-CCNC: 17 U/L — SIGNIFICANT CHANGE UP (ref 15–37)
BILIRUB SERPL-MCNC: 0.2 MG/DL — SIGNIFICANT CHANGE UP (ref 0.2–1.2)
BUN SERPL-MCNC: 12 MG/DL — SIGNIFICANT CHANGE UP (ref 7–23)
CALCIUM SERPL-MCNC: 8.3 MG/DL — LOW (ref 8.5–10.1)
CHLORIDE SERPL-SCNC: 106 MMOL/L — SIGNIFICANT CHANGE UP (ref 96–108)
CO2 SERPL-SCNC: 26 MMOL/L — SIGNIFICANT CHANGE UP (ref 22–31)
CREAT SERPL-MCNC: 0.38 MG/DL — LOW (ref 0.5–1.3)
EGFR: 99 ML/MIN/1.73M2 — SIGNIFICANT CHANGE UP
GLUCOSE BLDC GLUCOMTR-MCNC: 108 MG/DL — HIGH (ref 70–99)
GLUCOSE BLDC GLUCOMTR-MCNC: 111 MG/DL — HIGH (ref 70–99)
GLUCOSE BLDC GLUCOMTR-MCNC: 113 MG/DL — HIGH (ref 70–99)
GLUCOSE BLDC GLUCOMTR-MCNC: 116 MG/DL — HIGH (ref 70–99)
GLUCOSE BLDC GLUCOMTR-MCNC: 165 MG/DL — HIGH (ref 70–99)
GLUCOSE SERPL-MCNC: 109 MG/DL — HIGH (ref 70–99)
HCT VFR BLD CALC: 26.6 % — LOW (ref 34.5–45)
HGB BLD-MCNC: 9 G/DL — LOW (ref 11.5–15.5)
MAGNESIUM SERPL-MCNC: 2 MG/DL — SIGNIFICANT CHANGE UP (ref 1.6–2.6)
MCHC RBC-ENTMCNC: 33.8 GM/DL — SIGNIFICANT CHANGE UP (ref 32–36)
MCHC RBC-ENTMCNC: 34.1 PG — HIGH (ref 27–34)
MCV RBC AUTO: 100.8 FL — HIGH (ref 80–100)
PHOSPHATE SERPL-MCNC: 3.5 MG/DL — SIGNIFICANT CHANGE UP (ref 2.5–4.5)
PLATELET # BLD AUTO: 261 K/UL — SIGNIFICANT CHANGE UP (ref 150–400)
POTASSIUM SERPL-MCNC: 4.2 MMOL/L — SIGNIFICANT CHANGE UP (ref 3.5–5.3)
POTASSIUM SERPL-SCNC: 4.2 MMOL/L — SIGNIFICANT CHANGE UP (ref 3.5–5.3)
PROT SERPL-MCNC: 6.2 GM/DL — SIGNIFICANT CHANGE UP (ref 6–8.3)
RBC # BLD: 2.64 M/UL — LOW (ref 3.8–5.2)
RBC # FLD: 13.4 % — SIGNIFICANT CHANGE UP (ref 10.3–14.5)
SODIUM SERPL-SCNC: 135 MMOL/L — SIGNIFICANT CHANGE UP (ref 135–145)
WBC # BLD: 6.31 K/UL — SIGNIFICANT CHANGE UP (ref 3.8–10.5)
WBC # FLD AUTO: 6.31 K/UL — SIGNIFICANT CHANGE UP (ref 3.8–10.5)

## 2023-04-20 PROCEDURE — 93970 EXTREMITY STUDY: CPT | Mod: 26

## 2023-04-20 RX ORDER — PANTOPRAZOLE SODIUM 20 MG/1
40 TABLET, DELAYED RELEASE ORAL
Refills: 0 | Status: DISCONTINUED | OUTPATIENT
Start: 2023-04-20 | End: 2023-04-21

## 2023-04-20 RX ORDER — LEVOTHYROXINE SODIUM 125 MCG
100 TABLET ORAL DAILY
Refills: 0 | Status: DISCONTINUED | OUTPATIENT
Start: 2023-04-20 | End: 2023-04-21

## 2023-04-20 RX ADMIN — HEPARIN SODIUM 5000 UNIT(S): 5000 INJECTION INTRAVENOUS; SUBCUTANEOUS at 11:24

## 2023-04-20 RX ADMIN — Medication 1 TABLET(S): at 11:25

## 2023-04-20 RX ADMIN — Medication 100 MILLIGRAM(S): at 11:35

## 2023-04-20 RX ADMIN — HEPARIN SODIUM 5000 UNIT(S): 5000 INJECTION INTRAVENOUS; SUBCUTANEOUS at 22:39

## 2023-04-20 RX ADMIN — OXYCODONE HYDROCHLORIDE 5 MILLIGRAM(S): 5 TABLET ORAL at 21:00

## 2023-04-20 RX ADMIN — Medication 50 MILLIGRAM(S): at 20:41

## 2023-04-20 RX ADMIN — OXYCODONE HYDROCHLORIDE 5 MILLIGRAM(S): 5 TABLET ORAL at 20:28

## 2023-04-20 NOTE — DISCHARGE NOTE PROVIDER - NSDCCPTREATMENT_GEN_ALL_CORE_FT
PRINCIPAL PROCEDURE  Procedure: Extended right hemicolectomy and anastomosis of ileum to colon  Findings and Treatment:       SECONDARY PROCEDURE  Procedure: Diagnostic laparoscopy with laparotomy  Findings and Treatment:     Procedure: Open lysis of intestinal adhesions  Findings and Treatment:

## 2023-04-20 NOTE — PROGRESS NOTE ADULT - ASSESSMENT
83 year old female with ascending colon cancer  S/P open right hemicolectomy with small bowel resection POD8  Had BM, passing gas     Plan:  pain control prn  monitor VS  LRD  monitor bowel function  encourage IS/OOB/PT  Continue close supportive care  CM for dispo planning  if swelling does not improve Vdup will be consider    Plan discussed with Colorectal surgery team

## 2023-04-20 NOTE — PROVIDER CONTACT NOTE (OTHER) - SITUATION
Office is aware that patient is in [facility].  Please fax discharge papers to [phone].
PT c/o of feet feeling "swollen"

## 2023-04-20 NOTE — DISCHARGE NOTE PROVIDER - CARE PROVIDER_API CALL
Roel Lopez; FABIAN)  ColonRectal Surgery; [place]  [address] [place] [address]  Phone: [phone]  Fax: [phone]  Follow Up Time: 2 weeks

## 2023-04-20 NOTE — PROVIDER CONTACT NOTE (OTHER) - ASSESSMENT
Pt found OOB to chair. Denies pain. B/l lungs clear. Belching, c/o nausea and normal active bowl sounds noted. B/L LE +2 edema with + pulses.

## 2023-04-20 NOTE — DISCHARGE NOTE PROVIDER - NSDCMRMEDTOKEN_GEN_ALL_CORE_FT
acetaminophen 325 mg oral tablet: 2 tab(s) orally every 6 hours, As needed, Mild Pain  CoQ10 100 mg oral capsule: 1 orally once a day  D3 25 mcg (1000 intl units) oral tablet: 1 orally once a day  Multiple Vitamins oral tablet: 1 orally once a day  Osteo Bi-Flex Triple Strength oral tablet: 2 tab(s) orally once a day  phenytoin 100 mg oral capsule: 1 tab(s) orally once a day  phenytoin 50 mg oral tablet, chewable: 1 orally once a day (at bedtime)  Synthroid 100 mcg (0.1 mg) oral tablet: 1 orally once a day *patient takes brand name at home *  turmeric 500 mg oral capsule: 1 orally once a day

## 2023-04-20 NOTE — CHART NOTE - NSCHARTNOTEFT_GEN_A_CORE
Clinical Nutrition PN Follow Up Note    * 83 year old female recently diagnosed with colon cancer presents to PST for planned laparoscopic possible open right hemicolectomy, lymph node dissection, mobilization of splenic flexure on 04/13/2023 with Dr. Lopez.    *PPN initiated 2/2 suspected prolonged NPO status  *4/17: PPN day #5 and POD5, continues to be NPO and has NGT to LWS. Large invasive tumor found intraop. Follow up bowel function, s/p suppository for stimulation.   *4/18: NGT removed; c/w PPN - labs stable, no changes.  On CLD, as per surgical resident, eating minimally and not tolerating well, will stay on clears. Pt feeling very weak. Reports having a BM but not passing flatus.   *4/19: transferred to  from SICU.   tolerated FLD well as per surg resident. Having bowel function. Plan to advance to low fiber today; will c/w PPN one more day. Lytes stable, no chnages to PN bag today.    *current status: tolerating low fiber diet wlel - will d/c PPN. Encourage protein-rich foods.     *pertinent meds: admelog, LR, MVI, pantoprazole, hydromorphone, zofran     *labs reviewed; 04-20    135  |  106  |  12  ----------------------------<  109<H>  4.2   |  26  |  0.38<L>    Ca    8.3<L>      20 Apr 2023 04:47  Phos  3.5     04-20  Mg     2.0     04-20    TPro  6.2  /  Alb  2.2<L>  /  TBili  0.2  /  DBili  x   /  AST  17  /  ALT  20  /  AlkPhos  131<H>  04-20    BMI: BMI (kg/m2): 18.8 (04-11-23 @ 10:57)  HbA1c: A1C with Estimated Average Glucose Result: 5.4 % (04-06-23 @ 15:27)    BP: 145/62 (04-17-23 @ 06:00) (109/49 - 163/62)  Lipid Panel: Date/Time: 04-14-23 @ 06:09  Triglycerides, Serum: 107    POCT Blood Glucose.: 111 mg/dL (20 Apr 2023 06:02)  POCT Blood Glucose.: 105 mg/dL (19 Apr 2023 22:44)  POCT Blood Glucose.: 127 mg/dL (19 Apr 2023 18:22)  POCT Blood Glucose.: 155 mg/dL (19 Apr 2023 11:35)    *I&O's Detail    20 Apr 2023 07:01  -  20 Apr 2023 07:50  --------------------------------------------------------  IN:  Total IN: 0 mL    OUT:    Voided (mL): 400 mL  Total OUT: 400 mL    Total NET: -400 mL  * fluid status: positive; will monitor/ adjust prn.  *last BM (+) on 4/18 - passing flatus, abd discomfort.   *mild edema noted on 4/16    *malnutrition: Pt continues to meet criteria for severe protein-calorie malnutrition in context of chronic disease r/t decreased ability to meet increased nutrient needs 2/2 cancer AEB severe muscle/ fat wasting    Estimated Needs: based on 56.2 Kg (wt from 4/13)  Calories: 8248-0338 Kcal (30-35 Kcal/Kg)  Protein:  g (1.5-2 g/Kg)  Fluids: 6033-6635 mL (30-35 mL/Kg)    Diet, Low Fiber (04-19-23 @ 09:49) [Active]    Weight History:  Height (cm): 167.6 (04-11-23 @ 10:57), 167.6 (04-06-23 @ 15:13)  Weight (kg): 52.9 (04-11-23 @ 10:57), 53.1 (04-06-23 @ 17:03)  BMI (kg/m2): 18.8 (04-11-23 @ 10:57), 18.9 (04-06-23 @ 17:03)  BSA (m2): 1.59 (04-11-23 @ 10:57), 1.59 (04-06-23 @ 17:03)  IBW: 59.1kg   IBW %: 95.1%    Additional Recommendations:    1) Weekly weights  2) Strict I & O's  3) Monitor lytes/ min and replete prn  4) Add premier protein shakes BID to optimize nutr status  5) MVI w/ minerals daily to ensure 100% RDA met   6) Monitor bowel movements, if no BM for >3 days, consider implementing bowel regimen.     RD will continue to monitor PO intake, labs, hydration, and wt prn.   Thu Castillo MS, RDN, CNSC, [phone]  Certified Nutrition

## 2023-04-20 NOTE — DISCHARGE NOTE PROVIDER - HOSPITAL COURSE
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 Pt is ready for discharge today.

## 2023-04-20 NOTE — DISCHARGE NOTE PROVIDER - NSDCCPCAREPLAN_GEN_ALL_CORE_FT
PRINCIPAL DISCHARGE DIAGNOSIS  Diagnosis: Colon cancer, ascending  Assessment and Plan of Treatment:

## 2023-04-20 NOTE — PROVIDER CONTACT NOTE (OTHER) - ACTION/TREATMENT ORDERED:
MD informed. DC IV fluids. Hold PPN for tonight. Cont. diet recommend. Elevate legs and ace wraps if patient desires. Will reassess in the AM when rounding.

## 2023-04-20 NOTE — DISCHARGE NOTE PROVIDER - NSDCACTIVITY_GEN_ALL_CORE
Return to Work/School allowed/Sex allowed/Showering allowed/Stairs allowed/Driving allowed/Walking - Indoors allowed/No heavy lifting/straining/Walking - Outdoors allowed Showering allowed/Stairs allowed/Walking - Indoors allowed/No heavy lifting/straining/Walking - Outdoors allowed/Follow Instructions Provided by your Surgical Team

## 2023-04-20 NOTE — DISCHARGE NOTE PROVIDER - NSDCFUSCHEDAPPT_GEN_ALL_CORE_FT
Loyd, Robert  [facility] Physician [facility] [address]   Scheduled Appointment: 04/24/2023

## 2023-04-21 ENCOUNTER — NON-APPOINTMENT (OUTPATIENT)
Age: 83
End: 2023-04-21

## 2023-04-21 ENCOUNTER — TRANSCRIPTION ENCOUNTER (OUTPATIENT)
Age: 83
End: 2023-04-21

## 2023-04-21 VITALS
SYSTOLIC BLOOD PRESSURE: 126 MMHG | HEART RATE: 70 BPM | OXYGEN SATURATION: 94 % | TEMPERATURE: 99 F | DIASTOLIC BLOOD PRESSURE: 54 MMHG | RESPIRATION RATE: 18 BRPM

## 2023-04-21 LAB
ANION GAP SERPL CALC-SCNC: 3 MMOL/L — LOW (ref 5–17)
BUN SERPL-MCNC: 10 MG/DL — SIGNIFICANT CHANGE UP (ref 7–23)
CALCIUM SERPL-MCNC: 8.6 MG/DL — SIGNIFICANT CHANGE UP (ref 8.5–10.1)
CHLORIDE SERPL-SCNC: 106 MMOL/L — SIGNIFICANT CHANGE UP (ref 96–108)
CO2 SERPL-SCNC: 27 MMOL/L — SIGNIFICANT CHANGE UP (ref 22–31)
CREAT SERPL-MCNC: 0.4 MG/DL — LOW (ref 0.5–1.3)
EGFR: 98 ML/MIN/1.73M2 — SIGNIFICANT CHANGE UP
GLUCOSE BLDC GLUCOMTR-MCNC: 101 MG/DL — HIGH (ref 70–99)
GLUCOSE BLDC GLUCOMTR-MCNC: 131 MG/DL — HIGH (ref 70–99)
GLUCOSE SERPL-MCNC: 103 MG/DL — HIGH (ref 70–99)
HCT VFR BLD CALC: 26.5 % — LOW (ref 34.5–45)
HGB BLD-MCNC: 8.8 G/DL — LOW (ref 11.5–15.5)
MAGNESIUM SERPL-MCNC: 1.8 MG/DL — SIGNIFICANT CHANGE UP (ref 1.6–2.6)
MCHC RBC-ENTMCNC: 33.2 GM/DL — SIGNIFICANT CHANGE UP (ref 32–36)
MCHC RBC-ENTMCNC: 33.8 PG — SIGNIFICANT CHANGE UP (ref 27–34)
MCV RBC AUTO: 101.9 FL — HIGH (ref 80–100)
PHOSPHATE SERPL-MCNC: 3.3 MG/DL — SIGNIFICANT CHANGE UP (ref 2.5–4.5)
PLATELET # BLD AUTO: 305 K/UL — SIGNIFICANT CHANGE UP (ref 150–400)
POTASSIUM SERPL-MCNC: 4 MMOL/L — SIGNIFICANT CHANGE UP (ref 3.5–5.3)
POTASSIUM SERPL-SCNC: 4 MMOL/L — SIGNIFICANT CHANGE UP (ref 3.5–5.3)
RBC # BLD: 2.6 M/UL — LOW (ref 3.8–5.2)
RBC # FLD: 13.3 % — SIGNIFICANT CHANGE UP (ref 10.3–14.5)
SARS-COV-2 RNA SPEC QL NAA+PROBE: SIGNIFICANT CHANGE UP
SODIUM SERPL-SCNC: 136 MMOL/L — SIGNIFICANT CHANGE UP (ref 135–145)
WBC # BLD: 5.98 K/UL — SIGNIFICANT CHANGE UP (ref 3.8–10.5)
WBC # FLD AUTO: 5.98 K/UL — SIGNIFICANT CHANGE UP (ref 3.8–10.5)

## 2023-04-21 RX ORDER — FUROSEMIDE 40 MG
20 TABLET ORAL ONCE
Refills: 0 | Status: COMPLETED | OUTPATIENT
Start: 2023-04-21 | End: 2023-04-21

## 2023-04-21 RX ADMIN — Medication 650 MILLIGRAM(S): at 02:48

## 2023-04-21 RX ADMIN — Medication 100 MILLIGRAM(S): at 10:05

## 2023-04-21 RX ADMIN — HEPARIN SODIUM 5000 UNIT(S): 5000 INJECTION INTRAVENOUS; SUBCUTANEOUS at 10:04

## 2023-04-21 RX ADMIN — Medication 650 MILLIGRAM(S): at 03:20

## 2023-04-21 RX ADMIN — Medication 100 MICROGRAM(S): at 06:03

## 2023-04-21 RX ADMIN — Medication 650 MILLIGRAM(S): at 10:05

## 2023-04-21 RX ADMIN — Medication 650 MILLIGRAM(S): at 10:35

## 2023-04-21 RX ADMIN — PANTOPRAZOLE SODIUM 40 MILLIGRAM(S): 20 TABLET, DELAYED RELEASE ORAL at 06:03

## 2023-04-21 RX ADMIN — Medication 1 TABLET(S): at 10:04

## 2023-04-21 RX ADMIN — Medication 20 MILLIGRAM(S): at 10:04

## 2023-04-21 NOTE — PROGRESS NOTE ADULT - ATTENDING COMMENTS
Patient seen and examined at bedside  NGT discontinued yesterday  Patient is NPO, denies N/V, reports belching  No bowel function at this time  Abdomen is soft, ND, AT  Can have sips of water and ice chips  PPN  Follow up bowel function  May chew gum and have hard candy  Ensure patient is up and ambulating 2-3 times daily  Daily PT  Continue close supportive care    Vital Signs Last 24 Hrs  T(C): 37 (15 Apr 2023 10:17), Max: 37 (15 Apr 2023 10:17)  T(F): 98.6 (15 Apr 2023 10:17), Max: 98.6 (15 Apr 2023 10:17)  HR: 68 (15 Apr 2023 12:00) (68 - 79)  BP: 142/77 (15 Apr 2023 12:00) (109/49 - 143/53)  BP(mean): 97 (15 Apr 2023 12:00) (67 - 97)  RR: 19 (15 Apr 2023 12:00) (13 - 20)  SpO2: 97% (15 Apr 2023 12:00) (93% - 99%)    Parameters below as of 15 Apr 2023 12:00  Patient On (Oxygen Delivery Method): nasal cannula  O2 Flow (L/min): 2                          8.4    5.88  )-----------( 196      ( 15 Apr 2023 05:44 )             24.9
Patient reports passing BM but not much flatus.  Still intermittently burping.  No overt nausea/vomiting.  Pain somewhat poorly controlled this morning.  Feels weak.  On exam abdomen is soft but distended, no rebound/guarding.  Incision C/D/I.  Plan for cautious advancement to clear liquids, continue TPN until tolerating better PO.  Needs to ambulate - PT.  Multimodal pain control.
Patient seen and examined at bedside  Patient is NPO with NGT, minimal output  She denies N/V, denies bowel function at this time  Abdomen is soft, ND, AT  Patient required straight cath after Young removal  Plan to DC NGT, Keep NPO, follow up for bowel function  Replace Young if patient is unable to void  Continue close supportive care    Vital Signs Last 24 Hrs  T(C): 36.7 (14 Apr 2023 14:26), Max: 37 (13 Apr 2023 21:51)  T(F): 98 (14 Apr 2023 14:26), Max: 98.6 (13 Apr 2023 21:51)  HR: 70 (14 Apr 2023 17:00) (70 - 85)  BP: 135/56 (14 Apr 2023 17:00) (122/48 - 155/64)  BP(mean): 78 (14 Apr 2023 17:00) (70 - 91)  RR: 16 (14 Apr 2023 17:00) (14 - 22)  SpO2: 95% (14 Apr 2023 17:00) (95% - 99%)                          9.1    5.93  )-----------( 202      ( 14 Apr 2023 06:09 )             27.0
Patient seen and examined at bedside  Patient is NPO, denies N/V, denies belching  No bowel function at this time  Abdomen is soft, ND, AT  Continue sips of water and ice chips, PPN  Follow up bowel function, suppository for stimulation  May chew gum and have hard candy  Ensure patient is up and ambulating 2-3 times daily  Daily PT  Continue close supportive care    Vital Signs Last 24 Hrs  T(C): 37.2 (16 Apr 2023 09:19), Max: 37.7 (15 Apr 2023 20:46)  T(F): 99 (16 Apr 2023 09:19), Max: 99.9 (15 Apr 2023 20:46)  HR: 68 (16 Apr 2023 14:01) (64 - 79)  BP: 145/57 (16 Apr 2023 14:01) (111/95 - 150/53)  BP(mean): 81 (16 Apr 2023 14:01) (68 - 105)  RR: 22 (16 Apr 2023 14:01) (13 - 22)  SpO2: 100% (16 Apr 2023 14:01) (92% - 100%)    Parameters below as of 15 Apr 2023 22:00  Patient On (Oxygen Delivery Method): nasal cannula  O2 Flow (L/min): 2                          8.3    4.61  )-----------( 194      ( 16 Apr 2023 04:47 )             24.7
Patient seen and examined this morning. Feels well and is sitting in chair. Pain well controlled. No fevers or chills. Had BM 2 days ago but none since. No nausea or emesis. Abdomen is soft, non distended and incisions are clean and intact. On PPN. Will start full liquid diet today and advised to go slow. Ambulate and IS.
Patient seen and examined, no acute issues overnight.  Exam benign, soft, nondistended, appropriately tender, incision C/D/I without erythema or discharge.  No labs. Currently awaiting GABE placement - stable for discharge when bed available.
S&E   as above.  No change in plans.
S&E  No complaints.  Mild incisional pain.  AFVSS  NAD  soft, NTND  Labs reviewed  A/P -   D/C franklin. Void trial.  PT/OT. OOB.  Cont NPO, NGT, PPN.

## 2023-04-21 NOTE — PROGRESS NOTE ADULT - ASSESSMENT
83 year old female with ascending colon cancer  S/P open right hemicolectomy with small bowel resection POD8  Had BM, passing gas   Vdup(-)    Plan:  pain control prn  monitor VS  LRD  monitor bowel function  encourage IS/OOB/PT  CM for dispo planning - possible dc to GABE today    Plan discussed with Colorectal surgery team

## 2023-04-21 NOTE — CHART NOTE - NSCHARTNOTESELECT_GEN_ALL_CORE
Dietitian F/U w/ PPN
Dietitian F/U
Dietitian F/U  w/ PPN
Event Note
Event Note

## 2023-04-21 NOTE — PROGRESS NOTE ADULT - PROVIDER SPECIALTY LIST ADULT
Colorectal Surgery
SICU
Colorectal Surgery
Hospitalist
Critical Care
SICU
SICU

## 2023-04-21 NOTE — DISCHARGE NOTE NURSING/CASE MANAGEMENT/SOCIAL WORK - PATIENT PORTAL LINK FT
You can access the FollowMyHealth Patient Portal offered by [facility] by registering at the following website: [email or web address]. By joining [employer]’s FollowMyHealth portal, you will also be able to view your health information using other applications (apps) compatible with our system.

## 2023-04-21 NOTE — DISCHARGE NOTE NURSING/CASE MANAGEMENT/SOCIAL WORK - NSDCVIVACCINE_GEN_ALL_CORE_FT
Influenza, seasonal, injectable; 2014/10/0 ; Margo Rico (RN);   pneumococcal polysaccharide PPV23; 29-Oct-2014 14:45; Maribel Guadalupe (RN); [employer] [id number]; IntraMuscular; Deltoid Left.; 0.5 milliLiter(s);

## 2023-04-21 NOTE — PROGRESS NOTE ADULT - SUBJECTIVE AND OBJECTIVE BOX
SURGERY DAILY PROGRESS NOTE:     Subjective:  Patient seen and examined this AM at bedside. No acute events overnight and patient resting comfortably. Tolerating diet, having bowel function. Denies fever/chills, shortness of breath, chest pain. VS reviewed    Objective:    MEDICATIONS  (STANDING):  acetaminophen   IVPB .. 1000 milliGRAM(s) IV Intermittent once  dextrose 5%. 1000 milliLiter(s) (100 mL/Hr) IV Continuous <Continuous>  dextrose 5%. 1000 milliLiter(s) (50 mL/Hr) IV Continuous <Continuous>  dextrose 50% Injectable 25 Gram(s) IV Push once  dextrose 50% Injectable 12.5 Gram(s) IV Push once  dextrose 50% Injectable 25 Gram(s) IV Push once  glucagon  Injectable 1 milliGRAM(s) IntraMuscular once  heparin   Injectable 5000 Unit(s) SubCutaneous every 12 hours  insulin lispro (ADMELOG) corrective regimen sliding scale   SubCutaneous every 6 hours  levothyroxine 100 MICROGram(s) Oral daily  multivitamin 1 Tablet(s) Oral daily  pantoprazole    Tablet 40 milliGRAM(s) Oral before breakfast  phenytoin   Capsule 100 milliGRAM(s) Oral daily  phenytoin   Chewable 50 milliGRAM(s) Chew <User Schedule>    MEDICATIONS  (PRN):  acetaminophen     Tablet .. 650 milliGRAM(s) Oral every 6 hours PRN Temp greater or equal to 38C (100.4F), Mild Pain (1 - 3)  dextrose Oral Gel 15 Gram(s) Oral once PRN Blood Glucose LESS THAN 70 milliGRAM(s)/deciliter  ondansetron Injectable 4 milliGRAM(s) IV Push every 6 hours PRN Nausea  oxyCODONE    IR 5 milliGRAM(s) Oral every 4 hours PRN Severe Pain (7 - 10)      Vital Signs Last 24 Hrs  T(C): 36.6 (20 Apr 2023 20:57), Max: 37 (20 Apr 2023 08:27)  T(F): 97.8 (20 Apr 2023 20:57), Max: 98.6 (20 Apr 2023 08:27)  HR: 58 (20 Apr 2023 20:57) (56 - 69)  BP: 138/52 (20 Apr 2023 20:57) (135/46 - 146/48)  BP(mean): --  RR: 18 (20 Apr 2023 20:57) (18 - 18)  SpO2: 98% (20 Apr 2023 20:57) (97% - 98%)    Parameters below as of 20 Apr 2023 20:57  Patient On (Oxygen Delivery Method): room air          PHYSICAL EXAM   GENERAL: NAD, AOx3, well developed  HEAD: Atraumatic, normocephalic  EYES: EOMI, PERRLA, conjunctiva and sclera clear  ENT: moist mucous membrane  NECK: supple, No JVD, midline trachea  CHEST/LUNG: unlabored respirations  Heart: S1, S2 normal  ABDOMEN: soft, nondistended, appropriate tenderness to palpation. Surgical site c/d/i  NERVOUS SYSTEM: AOx3, speech clear, no neuro-deficits  MSK: full ROM, no deformities  SKIN: warm to touch, no rash or lesions      I&O's Detail    20 Apr 2023 07:01  -  21 Apr 2023 06:11  --------------------------------------------------------  IN:  Total IN: 0 mL    OUT:    Voided (mL): 400 mL  Total OUT: 400 mL    Total NET: -400 mL          Daily     Daily     LABS:                        9.0    6.31  )-----------( 261      ( 20 Apr 2023 04:47 )             26.6     04-20    135  |  106  |  12  ----------------------------<  109<H>  4.2   |  26  |  0.38<L>    Ca    8.3<L>      20 Apr 2023 04:47  Phos  3.5     04-20  Mg     2.0     04-20    TPro  6.2  /  Alb  2.2<L>  /  TBili  0.2  /  DBili  x   /  AST  17  /  ALT  20  /  AlkPhos  131<H>  04-20          RADIOLOGY & ADDITIONAL STUDIES:

## 2023-04-21 NOTE — PROGRESS NOTE ADULT - NUTRITIONAL ASSESSMENT
This patient has been assessed with a concern for Malnutrition and has been determined to have a diagnosis/diagnoses of Severe protein-calorie malnutrition and Underweight (BMI < 19).    This patient is being managed with:   fat emulsion (Fish Oil and Plant Based) 20% Infusion-[Known as SMOFLIPID 20% Infusion]  50 Gram(s) in IV Solution 250 milliLiter(s) infuse at 20.83 mL/Hr  Dose Rate: 0.96 Gm/kG/Day Infuse Over: 12 Hours; Stop After 12 Hours  Administration Instructions: Use 1.2 micron in-line filter  Entered: Apr 16 2023 10:00PM    Parenteral Nutrition - Adult-  Entered: Apr 16 2023 10:00PM    fat emulsion (Fish Oil and Plant Based) 20% Infusion-[Known as SMOFLIPID 20% Infusion]  50 Gram(s) in IV Solution 250 milliLiter(s) infuse at 20.83 mL/Hr  Dose Rate: 0.96 Gm/kG/Day Infuse Over: 12 Hours; Stop After 12 Hours  Administration Instructions: Use 1.2 micron in-line filter  Entered: Apr 15 2023 10:00PM    Parenteral Nutrition - Adult-  Entered: Apr 15 2023 10:00PM    Diet NPO-  Except Medications  With Ice Chips/Sips of Water  Entered: Apr 12 2023  2:56PM  
This patient has been assessed with a concern for Malnutrition and has been determined to have a diagnosis/diagnoses of Severe protein-calorie malnutrition and Underweight (BMI < 19).    This patient is being managed with:   Parenteral Nutrition - Adult-  Entered: Apr 13 2023 10:00PM    Diet NPO-  Except Medications  With Ice Chips/Sips of Water  Entered: Apr 12 2023  2:56PM  
This patient has been assessed with a concern for Malnutrition and has been determined to have a diagnosis/diagnoses of Severe protein-calorie malnutrition and Underweight (BMI < 19).    This patient is being managed with:   Parenteral Nutrition - Adult-  Entered: Apr 14 2023 10:00PM    Parenteral Nutrition - Adult-  Entered: Apr 13 2023 10:00PM    Diet NPO-  Except Medications  With Ice Chips/Sips of Water  Entered: Apr 12 2023  2:56PM  
This patient has been assessed with a concern for Malnutrition and has been determined to have a diagnosis/diagnoses of Severe protein-calorie malnutrition and Underweight (BMI < 19).    This patient is being managed with:   Diet Low Fiber-  Entered: Apr 19 2023  9:49AM  
This patient has been assessed with a concern for Malnutrition and has been determined to have a diagnosis/diagnoses of Severe protein-calorie malnutrition and Underweight (BMI < 19).    This patient is being managed with:   fat emulsion (Fish Oil and Plant Based) 20% Infusion-[Known as SMOFLIPID 20% Infusion]  50 Gram(s) in IV Solution 250 milliLiter(s) infuse at 20.8 mL/Hr  Dose Rate: 0.945 Gm/kG/Day Infuse Over: 12 Hours; Stop After 12 Hours  Administration Instructions: Use 1.2 micron in-line filter  Entered: Apr 18 2023  5:00PM    Parenteral Nutrition - Adult-  Entered: Apr 18 2023  5:00PM    fat emulsion (Fish Oil and Plant Based) 20% Infusion-[Known as SMOFLIPID 20% Infusion]  50 Gram(s) in IV Solution 250 milliLiter(s) infuse at 20.83 mL/Hr  Dose Rate: 0.945 Gm/kG/Day Infuse Over: 12 Hours; Stop After 12 Hours  Administration Instructions: Use 1.2 micron in-line filter  Entered: Apr 17 2023 10:00PM    Parenteral Nutrition - Adult-  Entered: Apr 17 2023 10:00PM    Diet Clear Liquid-  Entered: Apr 17 2023 10:01AM    The following pending diet order is being considered for treatment of Severe protein-calorie malnutrition and Underweight (BMI < 19):null
This patient has been assessed with a concern for Malnutrition and has been determined to have a diagnosis/diagnoses of Severe protein-calorie malnutrition and Underweight (BMI < 19).    This patient is being managed with:   Parenteral Nutrition - Adult-  Entered: Apr 14 2023 10:00PM    Parenteral Nutrition - Adult-  Entered: Apr 13 2023 10:00PM    Diet NPO-  Except Medications  With Ice Chips/Sips of Water  Entered: Apr 12 2023  2:56PM

## 2023-04-21 NOTE — DISCHARGE NOTE NURSING/CASE MANAGEMENT/SOCIAL WORK - NSDCPEFALRISK_GEN_ALL_CORE
For information on Fall & Injury Prevention, visit: [email or web address] OR  [email or web address] OR  https://www.cdc.gov/steadi/patient.html

## 2023-04-24 ENCOUNTER — APPOINTMENT (OUTPATIENT)
Dept: FAMILY MEDICINE | Facility: CLINIC | Age: 83
End: 2023-04-24

## 2023-04-26 DIAGNOSIS — K66.0 PERITONEAL ADHESIONS (POSTPROCEDURAL) (POSTINFECTION): ICD-10-CM

## 2023-04-26 DIAGNOSIS — C18.0 MALIGNANT NEOPLASM OF CECUM: ICD-10-CM

## 2023-04-26 DIAGNOSIS — E43 UNSPECIFIED SEVERE PROTEIN-CALORIE MALNUTRITION: ICD-10-CM

## 2023-04-26 DIAGNOSIS — K21.9 GASTRO-ESOPHAGEAL REFLUX DISEASE WITHOUT ESOPHAGITIS: ICD-10-CM

## 2023-04-26 DIAGNOSIS — E03.9 HYPOTHYROIDISM, UNSPECIFIED: ICD-10-CM

## 2023-04-26 DIAGNOSIS — K29.70 GASTRITIS, UNSPECIFIED, WITHOUT BLEEDING: ICD-10-CM

## 2023-04-26 DIAGNOSIS — R01.1 CARDIAC MURMUR, UNSPECIFIED: ICD-10-CM

## 2023-04-26 DIAGNOSIS — C18.2 MALIGNANT NEOPLASM OF ASCENDING COLON: ICD-10-CM

## 2023-04-26 DIAGNOSIS — C77.2 SECONDARY AND UNSPECIFIED MALIGNANT NEOPLASM OF INTRA-ABDOMINAL LYMPH NODES: ICD-10-CM

## 2023-04-26 DIAGNOSIS — M19.90 UNSPECIFIED OSTEOARTHRITIS, UNSPECIFIED SITE: ICD-10-CM

## 2023-04-26 DIAGNOSIS — C18.4 MALIGNANT NEOPLASM OF TRANSVERSE COLON: ICD-10-CM

## 2023-04-26 DIAGNOSIS — Z53.31 LAPAROSCOPIC SURGICAL PROCEDURE CONVERTED TO OPEN PROCEDURE: ICD-10-CM

## 2023-04-26 DIAGNOSIS — G40.909 EPILEPSY, UNSPECIFIED, NOT INTRACTABLE, WITHOUT STATUS EPILEPTICUS: ICD-10-CM

## 2023-04-26 DIAGNOSIS — R33.8 OTHER RETENTION OF URINE: ICD-10-CM

## 2023-05-05 NOTE — PATIENT PROFILE ADULT - NSPROGENSOURCEINFO_GEN_A_NUR
patient Show Topical Anesthesia Variable?: Yes Spray Paint Technique: No Duration Of Freeze Thaw-Cycle (Seconds): 5-10 Detail Level: Simple Medical Necessity Information: It is in your best interest to select a reason for this procedure from the list below. All of these items fulfill various CMS LCD requirements except the new and changing color options. Consent: The patient's consent was obtained including but not limited to risks of crusting, scabbing, blistering, scarring, darker or lighter pigmentary change, recurrence, incomplete removal and infection. Number Of Freeze-Thaw Cycles: 3 freeze-thaw cycles Topical Anesthesia Type: liquid nitrogen Spray Paint Text: The liquid nitrogen was applied to the skin utilizing a spray paint frosting technique. Post-Care Instructions: I reviewed with the patient in detail post-care instructions. Patient is to wear sunprotection, and avoid picking at any of the treated lesions. Pt may apply Vaseline to crusted or scabbing areas. Medical Necessity Clause: This procedure was medically necessary because the lesions that were treated were: Application Tool (Optional): Liquid Nitrogen Sprayer Detail Level: Detailed Duration Of Freeze Thaw-Cycle (Seconds): 1 Number Of Freeze-Thaw Cycles: 4 freeze-thaw cycles

## 2023-05-20 ENCOUNTER — NON-APPOINTMENT (OUTPATIENT)
Age: 83
End: 2023-05-20

## 2023-05-23 DIAGNOSIS — K64.9 UNSPECIFIED HEMORRHOIDS: ICD-10-CM

## 2023-05-23 RX ORDER — FLUOCINOLONE ACETONIDE 0.25 MG/G
0.03 CREAM TOPICAL TWICE DAILY
Qty: 1 | Refills: 3 | Status: ACTIVE | COMMUNITY
Start: 2023-05-23 | End: 1900-01-01

## 2023-05-23 RX ORDER — HYDROCORTISONE ACETATE, PRAMOXINE HCL 2.5; 1 G/100G; G/100G
2.5-1 CREAM TOPICAL 3 TIMES DAILY
Qty: 1 | Refills: 5 | Status: ACTIVE | COMMUNITY
Start: 2023-05-23 | End: 1900-01-01

## 2023-05-25 RX ORDER — KIT FOR THE PREPARATION OF TECHNETIUM TC99M SESTAMIBI 1 MG/5ML
INJECTION, POWDER, LYOPHILIZED, FOR SOLUTION PARENTERAL
Refills: 0 | Status: COMPLETED | OUTPATIENT
Start: 2023-05-25

## 2023-05-25 RX ADMIN — KIT FOR THE PREPARATION OF TECHNETIUM TC99M SESTAMIBI 0: 1 INJECTION, POWDER, LYOPHILIZED, FOR SOLUTION PARENTERAL at 00:00

## 2023-05-26 RX ORDER — LEVOTHYROXINE SODIUM 0.1 MG/1
100 TABLET ORAL
Qty: 90 | Refills: 1 | Status: ACTIVE | COMMUNITY
Start: 2020-09-12 | End: 1900-01-01

## 2023-06-06 ENCOUNTER — APPOINTMENT (OUTPATIENT)
Dept: COLORECTAL SURGERY | Facility: CLINIC | Age: 83
End: 2023-06-06

## 2023-06-06 ENCOUNTER — APPOINTMENT (OUTPATIENT)
Dept: COLORECTAL SURGERY | Facility: CLINIC | Age: 83
End: 2023-06-06
Payer: MEDICARE

## 2023-06-06 VITALS
RESPIRATION RATE: 14 BRPM | DIASTOLIC BLOOD PRESSURE: 81 MMHG | WEIGHT: 105 LBS | BODY MASS INDEX: 16.88 KG/M2 | SYSTOLIC BLOOD PRESSURE: 159 MMHG | HEART RATE: 71 BPM | HEIGHT: 66 IN

## 2023-06-06 PROCEDURE — 99024 POSTOP FOLLOW-UP VISIT: CPT

## 2023-06-06 NOTE — HISTORY OF PRESENT ILLNESS
[FreeTextEntry1] : 83-year-old female postop open right hemicolectomy for advanced stage III colon cancer.  Patient is doing well overall

## 2023-06-06 NOTE — ASSESSMENT
[FreeTextEntry1] : 83-year-old female with stage III colon cancer with multiple metastatic lymph nodes.  Recommend regular diet increase activities as tolerated consultation with oncology for chemotherapy

## 2023-06-12 ENCOUNTER — APPOINTMENT (OUTPATIENT)
Dept: FAMILY MEDICINE | Facility: CLINIC | Age: 83
End: 2023-06-12
Payer: MEDICARE

## 2023-06-12 VITALS
HEART RATE: 88 BPM | SYSTOLIC BLOOD PRESSURE: 146 MMHG | WEIGHT: 101 LBS | BODY MASS INDEX: 16.23 KG/M2 | DIASTOLIC BLOOD PRESSURE: 78 MMHG | HEIGHT: 66 IN | OXYGEN SATURATION: 95 % | RESPIRATION RATE: 14 BRPM

## 2023-06-12 PROCEDURE — 99214 OFFICE O/P EST MOD 30 MIN: CPT

## 2023-06-12 RX ORDER — ONDANSETRON 4 MG/1
4 TABLET, ORALLY DISINTEGRATING ORAL
Qty: 30 | Refills: 0 | Status: ACTIVE | COMMUNITY
Start: 2023-06-12 | End: 1900-01-01

## 2023-06-12 RX ORDER — SIMETHICONE 125 MG/1
125 TABLET, CHEWABLE ORAL
Qty: 90 | Refills: 0 | Status: ACTIVE | COMMUNITY
Start: 2023-06-12 | End: 1900-01-01

## 2023-06-13 DIAGNOSIS — C18.2 MALIGNANT NEOPLASM OF ASCENDING COLON: ICD-10-CM

## 2023-06-13 NOTE — ASSESSMENT
[FreeTextEntry1] : Malignant Colon cancer - stage III with multiple metastatic lymph nodes\par Patient's current GI symptoms likely secondary to colon cancer diagnosis as well as recent surgery\par Will try simethicone 125 mg up to 4 times a day for symptoms of frequent belching\par We will prescribe Zofran 4 mg to be used as needed up to twice a day for nausea\par Called patients daughter Herminia while in the room with patient to discuss diagnosis\par Advised daughter to try and get an appointment with oncology for sometime sooner than next month, can place referral to [facility] if needed, daughter states they would prefer to go to her current oncologist's office\par follow up in 1 month or as needed

## 2023-06-13 NOTE — HISTORY OF PRESENT ILLNESS
[FreeTextEntry1] : Nausea 2 hours after eating, constant belching, poor appetite [de-identified] : 82 y/o female with pmhx of anemia, seizure disorder, arthritis, anxiety, hypothyroidism, stage 3 colon ca, presents for follow up\par \par For Colon ca, In January 2023, she had a CT A/P performed for reports of abdominal discomfort. This revealed irregular mural thickening of the ascending colon to the level of the hepatic flexure with associated mesenteric lymphadenopathy. 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 \par \par Patient is now S/P open extended right hemicolectomy with small bowel resection and extensive lysis of adhesion with Dr. Lopez. Diagnosis is stage III colon cancer with multiple metastatic lymph nodes. Patient states she has appointment with oncology next month.\par \par Patient has been experiencing symptoms of frequent belching, nausea and poor appetite.  Patient states that she is still passing gas and stools.  Denies any fevers or chills.

## 2023-06-13 NOTE — PHYSICAL EXAM
[Normal Rate] : normal rate  [Regular Rhythm] : with a regular rhythm [Normal S1, S2] : normal S1 and S2 [No Edema] : there was no peripheral edema [Soft] : abdomen soft [Non-distended] : non-distended [No Masses] : no abdominal mass palpated [Coordination Grossly Intact] : coordination grossly intact [No Focal Deficits] : no focal deficits [Normal Gait] : normal gait [Normal] : affect was normal and insight and judgment were intact [de-identified] : frail appearing [de-identified] : systolic murmur LUSB [de-identified] : hyperactive bowel sounds

## 2023-06-14 ENCOUNTER — INPATIENT (INPATIENT)
Facility: HOSPITAL | Age: 83
LOS: 4 days | Discharge: HOSPICE | DRG: 840 | End: 2023-06-19
Attending: STUDENT IN AN ORGANIZED HEALTH CARE EDUCATION/TRAINING PROGRAM | Admitting: STUDENT IN AN ORGANIZED HEALTH CARE EDUCATION/TRAINING PROGRAM
Payer: MEDICARE

## 2023-06-14 ENCOUNTER — NON-APPOINTMENT (OUTPATIENT)
Age: 83
End: 2023-06-14

## 2023-06-14 VITALS
WEIGHT: 100.97 LBS | HEART RATE: 80 BPM | HEIGHT: 66 IN | OXYGEN SATURATION: 98 % | DIASTOLIC BLOOD PRESSURE: 91 MMHG | RESPIRATION RATE: 20 BRPM | SYSTOLIC BLOOD PRESSURE: 160 MMHG | TEMPERATURE: 98 F

## 2023-06-14 DIAGNOSIS — T14.8 OTHER INJURY OF UNSPECIFIED BODY REGION: Chronic | ICD-10-CM

## 2023-06-14 DIAGNOSIS — Z98.49 CATARACT EXTRACTION STATUS, UNSPECIFIED EYE: Chronic | ICD-10-CM

## 2023-06-14 LAB
ALBUMIN SERPL ELPH-MCNC: 4.3 G/DL — SIGNIFICANT CHANGE UP (ref 3.3–5.2)
ALP SERPL-CCNC: 196 U/L — HIGH (ref 40–120)
ALT FLD-CCNC: 18 U/L — SIGNIFICANT CHANGE UP
ANION GAP SERPL CALC-SCNC: 14 MMOL/L — SIGNIFICANT CHANGE UP (ref 5–17)
APPEARANCE UR: ABNORMAL
APTT BLD: 26.8 SEC — LOW (ref 27.5–35.5)
AST SERPL-CCNC: 21 U/L — SIGNIFICANT CHANGE UP
BACTERIA # UR AUTO: ABNORMAL
BASOPHILS # BLD AUTO: 0.02 K/UL — SIGNIFICANT CHANGE UP (ref 0–0.2)
BASOPHILS NFR BLD AUTO: 0.2 % — SIGNIFICANT CHANGE UP (ref 0–2)
BILIRUB SERPL-MCNC: 0.3 MG/DL — LOW (ref 0.4–2)
BILIRUB UR-MCNC: NEGATIVE — SIGNIFICANT CHANGE UP
BUN SERPL-MCNC: 13 MG/DL — SIGNIFICANT CHANGE UP (ref 8–20)
CALCIUM SERPL-MCNC: 9.9 MG/DL — SIGNIFICANT CHANGE UP (ref 8.4–10.5)
CHLORIDE SERPL-SCNC: 87 MMOL/L — LOW (ref 96–108)
CO2 SERPL-SCNC: 32 MMOL/L — HIGH (ref 22–29)
COLOR SPEC: YELLOW — SIGNIFICANT CHANGE UP
CREAT SERPL-MCNC: 0.5 MG/DL — SIGNIFICANT CHANGE UP (ref 0.5–1.3)
DIFF PNL FLD: ABNORMAL
EGFR: 92 ML/MIN/1.73M2 — SIGNIFICANT CHANGE UP
EOSINOPHIL # BLD AUTO: 0.07 K/UL — SIGNIFICANT CHANGE UP (ref 0–0.5)
EOSINOPHIL NFR BLD AUTO: 0.7 % — SIGNIFICANT CHANGE UP (ref 0–6)
EPI CELLS # UR: SIGNIFICANT CHANGE UP
GAS PNL BLDV: SIGNIFICANT CHANGE UP
GLUCOSE SERPL-MCNC: 122 MG/DL — HIGH (ref 70–99)
GLUCOSE UR QL: NEGATIVE — SIGNIFICANT CHANGE UP
HCT VFR BLD CALC: 40.4 % — SIGNIFICANT CHANGE UP (ref 34.5–45)
HGB BLD-MCNC: 14 G/DL — SIGNIFICANT CHANGE UP (ref 11.5–15.5)
HIV 1 & 2 AB SERPL IA.RAPID: SIGNIFICANT CHANGE UP
IMM GRANULOCYTES NFR BLD AUTO: 0.3 % — SIGNIFICANT CHANGE UP (ref 0–0.9)
INR BLD: 1.13 RATIO — SIGNIFICANT CHANGE UP (ref 0.88–1.16)
KETONES UR-MCNC: ABNORMAL
LEUKOCYTE ESTERASE UR-ACNC: ABNORMAL
LIDOCAIN IGE QN: 35 U/L — SIGNIFICANT CHANGE UP (ref 22–51)
LYMPHOCYTES # BLD AUTO: 1.8 K/UL — SIGNIFICANT CHANGE UP (ref 1–3.3)
LYMPHOCYTES # BLD AUTO: 19 % — SIGNIFICANT CHANGE UP (ref 13–44)
MCHC RBC-ENTMCNC: 32.8 PG — SIGNIFICANT CHANGE UP (ref 27–34)
MCHC RBC-ENTMCNC: 34.7 GM/DL — SIGNIFICANT CHANGE UP (ref 32–36)
MCV RBC AUTO: 94.6 FL — SIGNIFICANT CHANGE UP (ref 80–100)
MONOCYTES # BLD AUTO: 0.73 K/UL — SIGNIFICANT CHANGE UP (ref 0–0.9)
MONOCYTES NFR BLD AUTO: 7.7 % — SIGNIFICANT CHANGE UP (ref 2–14)
NEUTROPHILS # BLD AUTO: 6.82 K/UL — SIGNIFICANT CHANGE UP (ref 1.8–7.4)
NEUTROPHILS NFR BLD AUTO: 72.1 % — SIGNIFICANT CHANGE UP (ref 43–77)
NITRITE UR-MCNC: POSITIVE
PH UR: 7 — SIGNIFICANT CHANGE UP (ref 5–8)
PLATELET # BLD AUTO: 304 K/UL — SIGNIFICANT CHANGE UP (ref 150–400)
POTASSIUM SERPL-MCNC: 3.5 MMOL/L — SIGNIFICANT CHANGE UP (ref 3.5–5.3)
POTASSIUM SERPL-SCNC: 3.5 MMOL/L — SIGNIFICANT CHANGE UP (ref 3.5–5.3)
PROT SERPL-MCNC: 7.9 G/DL — SIGNIFICANT CHANGE UP (ref 6.6–8.7)
PROT UR-MCNC: 300 MG/DL — SIGNIFICANT CHANGE UP
PROTHROM AB SERPL-ACNC: 13.1 SEC — SIGNIFICANT CHANGE UP (ref 10.5–13.4)
RBC # BLD: 4.27 M/UL — SIGNIFICANT CHANGE UP (ref 3.8–5.2)
RBC # FLD: 12.6 % — SIGNIFICANT CHANGE UP (ref 10.3–14.5)
RBC CASTS # UR COMP ASSIST: ABNORMAL /HPF (ref 0–4)
SODIUM SERPL-SCNC: 133 MMOL/L — LOW (ref 135–145)
SP GR SPEC: 1.01 — SIGNIFICANT CHANGE UP (ref 1.01–1.02)
UROBILINOGEN FLD QL: NEGATIVE — SIGNIFICANT CHANGE UP
WBC # BLD: 9.47 K/UL — SIGNIFICANT CHANGE UP (ref 3.8–10.5)
WBC # FLD AUTO: 9.47 K/UL — SIGNIFICANT CHANGE UP (ref 3.8–10.5)
WBC UR QL: ABNORMAL /HPF (ref 0–5)

## 2023-06-14 PROCEDURE — 99285 EMERGENCY DEPT VISIT HI MDM: CPT

## 2023-06-14 PROCEDURE — 71045 X-RAY EXAM CHEST 1 VIEW: CPT | Mod: 26

## 2023-06-14 PROCEDURE — G1004: CPT

## 2023-06-14 PROCEDURE — 71260 CT THORAX DX C+: CPT | Mod: 26,MA

## 2023-06-14 PROCEDURE — 70491 CT SOFT TISSUE NECK W/DYE: CPT | Mod: 26,MG

## 2023-06-14 PROCEDURE — 74177 CT ABD & PELVIS W/CONTRAST: CPT | Mod: 26,MG

## 2023-06-14 RX ORDER — SODIUM CHLORIDE 9 MG/ML
1000 INJECTION INTRAMUSCULAR; INTRAVENOUS; SUBCUTANEOUS ONCE
Refills: 0 | Status: COMPLETED | OUTPATIENT
Start: 2023-06-14 | End: 2023-06-14

## 2023-06-14 RX ORDER — METOCLOPRAMIDE HCL 10 MG
10 TABLET ORAL ONCE
Refills: 0 | Status: COMPLETED | OUTPATIENT
Start: 2023-06-14 | End: 2023-06-14

## 2023-06-14 RX ORDER — CEFTRIAXONE 500 MG/1
1000 INJECTION, POWDER, FOR SOLUTION INTRAMUSCULAR; INTRAVENOUS ONCE
Refills: 0 | Status: DISCONTINUED | OUTPATIENT
Start: 2023-06-14 | End: 2023-06-14

## 2023-06-14 RX ORDER — CEFTRIAXONE 500 MG/1
1000 INJECTION, POWDER, FOR SOLUTION INTRAMUSCULAR; INTRAVENOUS ONCE
Refills: 0 | Status: COMPLETED | OUTPATIENT
Start: 2023-06-14 | End: 2023-06-14

## 2023-06-14 RX ORDER — ONDANSETRON 8 MG/1
4 TABLET, FILM COATED ORAL ONCE
Refills: 0 | Status: COMPLETED | OUTPATIENT
Start: 2023-06-14 | End: 2023-06-14

## 2023-06-14 RX ORDER — FAMOTIDINE 10 MG/ML
20 INJECTION INTRAVENOUS ONCE
Refills: 0 | Status: COMPLETED | OUTPATIENT
Start: 2023-06-14 | End: 2023-06-14

## 2023-06-14 RX ADMIN — ONDANSETRON 4 MILLIGRAM(S): 8 TABLET, FILM COATED ORAL at 17:45

## 2023-06-14 RX ADMIN — FAMOTIDINE 20 MILLIGRAM(S): 10 INJECTION INTRAVENOUS at 17:44

## 2023-06-14 RX ADMIN — CEFTRIAXONE 1000 MILLIGRAM(S): 500 INJECTION, POWDER, FOR SOLUTION INTRAMUSCULAR; INTRAVENOUS at 23:12

## 2023-06-14 RX ADMIN — SODIUM CHLORIDE 1000 MILLILITER(S): 9 INJECTION INTRAMUSCULAR; INTRAVENOUS; SUBCUTANEOUS at 17:41

## 2023-06-14 RX ADMIN — Medication 10 MILLIGRAM(S): at 19:02

## 2023-06-14 RX ADMIN — Medication 30 MILLILITER(S): at 16:48

## 2023-06-14 NOTE — ED ADULT NURSE REASSESSMENT NOTE - NS ED NURSE REASSESS COMMENT FT1
received report from day RN, assumed care of pt at change of shift.  pt is AOX4, report slight improvement of nausea and states that she feels that she has a foreign body in her throat.  denies difficulty swallowing.  no s/s acute distress noted.  awaiting CT and urine sample.

## 2023-06-14 NOTE — ED PROVIDER NOTE - ATTENDING CONTRIBUTION TO CARE
Dr. Henriquez : I have personally seen and examined this patient at the bedside. I have fully participated in the care of this patient. I have reviewed all pertinent clinical information, including history, physical exam, plan and the Resident's note and agree except as noted.     83-year-old female history of colon cancer status post hemicolectomy, prior abdominal surgery with adhesions presents for nausea and vomiting for the past 1 week. difficulty swallowing and keeping food down. Patient endorsing epigastric abdominal pain, throat pain described as burning in nature. decreased po intake.     Denies f/c//cp/sob/palpitations/cough//d/c/dysuria/hematuria. no sick contacts/recent travel.    PE:  head; atraumatic normocephalic  eyes: perrla  Heart: rrr s1s2  lungs: ctab  abd: soft, diffuse abd ttp +scar  nd + bs no rebound/guarding no cva ttp  le: no swelling no calf ttp  back: no midline cervical/thoracic/lumbar ttp      -->concern for sbo will check labs ct pain control reassess pt signed out to incoming night team

## 2023-06-14 NOTE — ED ADULT NURSE NOTE - OBJECTIVE STATEMENT
Pt a&ox4 comes to Emergency Department with complaints of nausea, vomiting, and throat discomfort when trying to swallow food. Pt passed dysphagia screening, MD Rothman called to bed for US IV. Pt's respirations even and unlabored on room air, VSS, daughter at bedside no distress noted.

## 2023-06-14 NOTE — ED PROVIDER NOTE - OBJECTIVE STATEMENT
83-year-old female history of colon cancer status post hemicolectomy, prior abdominal surgery with adhesions presents for nausea and vomiting for the past 1 week.  States she has had no episodes of emesis today but for the past 7 days she has had pain with swallowing solids and liquids. States she feels water gets "stuck" in the back of the throat but was able to eat piece of an egg.  Patient endorsing epigastric abdominal pain, throat pain described as burning in nature.  Endorsing decreased p.o. intake.  Patient denies fever and chills.  Patient denies chest pain.  Denies shortness of breath.  States she had a bowel movement last night.  Denies trauma.

## 2023-06-14 NOTE — ED ADULT TRIAGE NOTE - CHIEF COMPLAINT QUOTE
Pt brought in by family c/o not able to eat or drink for few days  , feels like something " gets stuck in the throat" Hx of colon resection in April

## 2023-06-14 NOTE — ED ADULT NURSE NOTE - NSFALLRISKINTERV_ED_ALL_ED
Assistance OOB with selected safe patient handling equipment if applicable/Assistance with ambulation/Communicate fall risk and risk factors to all staff, patient, and family/Monitor gait and stability/Provide visual cue: yellow wristband, yellow gown, etc/Reinforce activity limits and safety measures with patient and family/Call bell, personal items and telephone in reach/Instruct patient to call for assistance before getting out of bed/chair/stretcher/Non-slip footwear applied when patient is off stretcher/[place] to call system/Physically safe environment - no spills, clutter or unnecessary equipment/Purposeful Proactive Rounding/Room/bathroom lighting operational, light cord in reach

## 2023-06-14 NOTE — ED ADULT TRIAGE NOTE - NS ED TRIAGE AVPU SCALE
10-Iftikhar-2021 18:18
Alert-The patient is alert, awake and responds to voice. The patient is oriented to time, place, and person. The triage nurse is able to obtain subjective information.

## 2023-06-15 ENCOUNTER — RESULT REVIEW (OUTPATIENT)
Age: 83
End: 2023-06-15

## 2023-06-15 ENCOUNTER — TRANSCRIPTION ENCOUNTER (OUTPATIENT)
Age: 83
End: 2023-06-15

## 2023-06-15 DIAGNOSIS — K31.1 ADULT HYPERTROPHIC PYLORIC STENOSIS: ICD-10-CM

## 2023-06-15 DIAGNOSIS — C18.9 MALIGNANT NEOPLASM OF COLON, UNSPECIFIED: ICD-10-CM

## 2023-06-15 DIAGNOSIS — K56.609 UNSPECIFIED INTESTINAL OBSTRUCTION, UNSPECIFIED AS TO PARTIAL VERSUS COMPLETE OBSTRUCTION: ICD-10-CM

## 2023-06-15 LAB
ANION GAP SERPL CALC-SCNC: 11 MMOL/L — SIGNIFICANT CHANGE UP (ref 5–17)
BASOPHILS # BLD AUTO: 0.04 K/UL — SIGNIFICANT CHANGE UP (ref 0–0.2)
BASOPHILS NFR BLD AUTO: 0.5 % — SIGNIFICANT CHANGE UP (ref 0–2)
BUN SERPL-MCNC: 9.8 MG/DL — SIGNIFICANT CHANGE UP (ref 8–20)
CALCIUM SERPL-MCNC: 8.6 MG/DL — SIGNIFICANT CHANGE UP (ref 8.4–10.5)
CHLORIDE SERPL-SCNC: 97 MMOL/L — SIGNIFICANT CHANGE UP (ref 96–108)
CO2 SERPL-SCNC: 26 MMOL/L — SIGNIFICANT CHANGE UP (ref 22–29)
CREAT SERPL-MCNC: 0.45 MG/DL — LOW (ref 0.5–1.3)
EGFR: 95 ML/MIN/1.73M2 — SIGNIFICANT CHANGE UP
EOSINOPHIL # BLD AUTO: 0.2 K/UL — SIGNIFICANT CHANGE UP (ref 0–0.5)
EOSINOPHIL NFR BLD AUTO: 2.5 % — SIGNIFICANT CHANGE UP (ref 0–6)
GLUCOSE BLDC GLUCOMTR-MCNC: 101 MG/DL — HIGH (ref 70–99)
GLUCOSE BLDC GLUCOMTR-MCNC: 104 MG/DL — HIGH (ref 70–99)
GLUCOSE SERPL-MCNC: 116 MG/DL — HIGH (ref 70–99)
HCT VFR BLD CALC: 34.5 % — SIGNIFICANT CHANGE UP (ref 34.5–45)
HGB BLD-MCNC: 11.7 G/DL — SIGNIFICANT CHANGE UP (ref 11.5–15.5)
IMM GRANULOCYTES NFR BLD AUTO: 0.3 % — SIGNIFICANT CHANGE UP (ref 0–0.9)
LYMPHOCYTES # BLD AUTO: 1.46 K/UL — SIGNIFICANT CHANGE UP (ref 1–3.3)
LYMPHOCYTES # BLD AUTO: 18.3 % — SIGNIFICANT CHANGE UP (ref 13–44)
MAGNESIUM SERPL-MCNC: 1.7 MG/DL — SIGNIFICANT CHANGE UP (ref 1.6–2.6)
MCHC RBC-ENTMCNC: 33.2 PG — SIGNIFICANT CHANGE UP (ref 27–34)
MCHC RBC-ENTMCNC: 33.9 GM/DL — SIGNIFICANT CHANGE UP (ref 32–36)
MCV RBC AUTO: 98 FL — SIGNIFICANT CHANGE UP (ref 80–100)
MONOCYTES # BLD AUTO: 0.91 K/UL — HIGH (ref 0–0.9)
MONOCYTES NFR BLD AUTO: 11.4 % — SIGNIFICANT CHANGE UP (ref 2–14)
NEUTROPHILS # BLD AUTO: 5.36 K/UL — SIGNIFICANT CHANGE UP (ref 1.8–7.4)
NEUTROPHILS NFR BLD AUTO: 67 % — SIGNIFICANT CHANGE UP (ref 43–77)
PHOSPHATE SERPL-MCNC: 3.2 MG/DL — SIGNIFICANT CHANGE UP (ref 2.4–4.7)
PLATELET # BLD AUTO: 223 K/UL — SIGNIFICANT CHANGE UP (ref 150–400)
POTASSIUM SERPL-MCNC: 3.8 MMOL/L — SIGNIFICANT CHANGE UP (ref 3.5–5.3)
POTASSIUM SERPL-SCNC: 3.8 MMOL/L — SIGNIFICANT CHANGE UP (ref 3.5–5.3)
RBC # BLD: 3.52 M/UL — LOW (ref 3.8–5.2)
RBC # FLD: 12.7 % — SIGNIFICANT CHANGE UP (ref 10.3–14.5)
SODIUM SERPL-SCNC: 134 MMOL/L — LOW (ref 135–145)
WBC # BLD: 7.99 K/UL — SIGNIFICANT CHANGE UP (ref 3.8–10.5)
WBC # FLD AUTO: 7.99 K/UL — SIGNIFICANT CHANGE UP (ref 3.8–10.5)

## 2023-06-15 PROCEDURE — 99222 1ST HOSP IP/OBS MODERATE 55: CPT

## 2023-06-15 PROCEDURE — 71045 X-RAY EXAM CHEST 1 VIEW: CPT | Mod: 26

## 2023-06-15 PROCEDURE — 88342 IMHCHEM/IMCYTCHM 1ST ANTB: CPT | Mod: 26

## 2023-06-15 PROCEDURE — 88305 TISSUE EXAM BY PATHOLOGIST: CPT | Mod: 26

## 2023-06-15 PROCEDURE — 99223 1ST HOSP IP/OBS HIGH 75: CPT

## 2023-06-15 PROCEDURE — 99232 SBSQ HOSP IP/OBS MODERATE 35: CPT

## 2023-06-15 PROCEDURE — 74018 RADEX ABDOMEN 1 VIEW: CPT | Mod: 26

## 2023-06-15 PROCEDURE — 43239 EGD BIOPSY SINGLE/MULTIPLE: CPT | Mod: 59

## 2023-06-15 RX ORDER — BENZOCAINE 10 %
1 GEL (GRAM) MUCOUS MEMBRANE ONCE
Refills: 0 | Status: DISCONTINUED | OUTPATIENT
Start: 2023-06-15 | End: 2023-06-19

## 2023-06-15 RX ORDER — ONDANSETRON 8 MG/1
4 TABLET, FILM COATED ORAL EVERY 6 HOURS
Refills: 0 | Status: DISCONTINUED | OUTPATIENT
Start: 2023-06-15 | End: 2023-06-19

## 2023-06-15 RX ORDER — MAGNESIUM SULFATE 500 MG/ML
2 VIAL (ML) INJECTION ONCE
Refills: 0 | Status: COMPLETED | OUTPATIENT
Start: 2023-06-15 | End: 2023-06-15

## 2023-06-15 RX ORDER — POTASSIUM CHLORIDE 20 MEQ
20 PACKET (EA) ORAL ONCE
Refills: 0 | Status: COMPLETED | OUTPATIENT
Start: 2023-06-15 | End: 2023-06-15

## 2023-06-15 RX ORDER — PIPERACILLIN AND TAZOBACTAM 4; .5 G/20ML; G/20ML
3.38 INJECTION, POWDER, LYOPHILIZED, FOR SOLUTION INTRAVENOUS EVERY 8 HOURS
Refills: 0 | Status: DISCONTINUED | OUTPATIENT
Start: 2023-06-15 | End: 2023-06-19

## 2023-06-15 RX ORDER — SODIUM CHLORIDE 9 MG/ML
1000 INJECTION, SOLUTION INTRAVENOUS
Refills: 0 | Status: DISCONTINUED | OUTPATIENT
Start: 2023-06-15 | End: 2023-06-15

## 2023-06-15 RX ORDER — DEXTROSE MONOHYDRATE, SODIUM CHLORIDE, AND POTASSIUM CHLORIDE 50; .745; 4.5 G/1000ML; G/1000ML; G/1000ML
1000 INJECTION, SOLUTION INTRAVENOUS
Refills: 0 | Status: DISCONTINUED | OUTPATIENT
Start: 2023-06-15 | End: 2023-06-19

## 2023-06-15 RX ORDER — LEVOTHYROXINE SODIUM 125 MCG
100 TABLET ORAL DAILY
Refills: 0 | Status: DISCONTINUED | OUTPATIENT
Start: 2023-06-15 | End: 2023-06-16

## 2023-06-15 RX ORDER — SODIUM CHLORIDE 9 MG/ML
500 INJECTION INTRAMUSCULAR; INTRAVENOUS; SUBCUTANEOUS ONCE
Refills: 0 | Status: COMPLETED | OUTPATIENT
Start: 2023-06-15 | End: 2023-06-15

## 2023-06-15 RX ORDER — ACETAMINOPHEN 500 MG
650 TABLET ORAL EVERY 6 HOURS
Refills: 0 | Status: DISCONTINUED | OUTPATIENT
Start: 2023-06-15 | End: 2023-06-16

## 2023-06-15 RX ADMIN — Medication 25 GRAM(S): at 08:11

## 2023-06-15 RX ADMIN — Medication 50 MILLIEQUIVALENT(S): at 10:22

## 2023-06-15 RX ADMIN — Medication 100 MILLIGRAM(S): at 11:08

## 2023-06-15 RX ADMIN — Medication 50 MILLIGRAM(S): at 21:39

## 2023-06-15 RX ADMIN — SODIUM CHLORIDE 500 MILLILITER(S): 9 INJECTION INTRAMUSCULAR; INTRAVENOUS; SUBCUTANEOUS at 05:54

## 2023-06-15 RX ADMIN — PIPERACILLIN AND TAZOBACTAM 25 GRAM(S): 4; .5 INJECTION, POWDER, LYOPHILIZED, FOR SOLUTION INTRAVENOUS at 13:21

## 2023-06-15 RX ADMIN — SODIUM CHLORIDE 100 MILLILITER(S): 9 INJECTION, SOLUTION INTRAVENOUS at 16:37

## 2023-06-15 RX ADMIN — Medication 100 MICROGRAM(S): at 05:54

## 2023-06-15 RX ADMIN — PIPERACILLIN AND TAZOBACTAM 25 GRAM(S): 4; .5 INJECTION, POWDER, LYOPHILIZED, FOR SOLUTION INTRAVENOUS at 21:39

## 2023-06-15 RX ADMIN — SODIUM CHLORIDE 100 MILLILITER(S): 9 INJECTION, SOLUTION INTRAVENOUS at 05:54

## 2023-06-15 NOTE — CONSULT NOTE ADULT - PROBLEM SELECTOR RECOMMENDATION 9
GOO, dysphagia   EGD 3/15/23 - normal esophagus, stomach and duodenum; pathology was negative for H. pylori, esophageal mucosa suggestive of reflux, mild reactive gastropathy.   CT chest/abd/pelvis w/ IV Cont (06.14.23) showed Wall thickening of the third portion of the duodenum suspect for mass and causing gastric outlet obstruction    - Maintain NPO, will plan for EGD today   - Maintain current T&S, INR <1.5, Hgb >7.0, Plt >50 prior to procedure  - NGT for decompression if nausea or vomiting   Further plan pending EGD results

## 2023-06-15 NOTE — H&P ADULT - NSHPPHYSICALEXAM_GEN_ALL_CORE
PHYSICAL EXAM:  GENERAL: NAD, well-groomed, well-developed  HEAD:  Atraumatic, Normocephalic  EYES: EOMI, PERRLA, conjunctiva and sclera clear  NECK: Supple, No JVD  CHEST/LUNG: non-labored breathing on room air.   HEART: Regular rate and rhythm; S1/S2  ABDOMEN: Soft, Nontender, Nondistended, midline laparotomy with well healing scar.   VASCULAR: Normal pulses, Normal capillary refill  EXTREMITIES:  2+ Peripheral Pulses, No cyanosis, No edema  SKIN: Warm, Intact

## 2023-06-15 NOTE — H&P ADULT - ATTENDING COMMENTS
83F recent extended right hemicolectomy, SBR. Presenting with dysphagia, N/V 2lb weight loss  CT findings below with significant distended stomach and duodenal suspected obstruction 2/2 mass  Abdomen is soft, nontender, nondistended    A/P  Admit to colorectal  Keep NPO, place NGT  IV fluid  resuscitation  IV antibiotics for UTI and pulmonary findings  GI ppx with protonix  DVT ppx  Medical evaluation for management of comorbidities  GI consulted for evaluation - EGD attempted, duodenal sweep could no be traversed. Repeat attempt and stent placement planned for 6/16  Heme/Onc consulted for widespread lymphadenopathy  Palliative consultation for goals of care discussion. Patient has decided to make herself DNR, but is still making a decision for DNI  Continue close supportive care         BOWEL: Circumferential wall thickening of the third portion of the duodenum with dilatation of the stomach and proximal duodenum. Right colon resection and ileocolic anastomosis.  PERITONEUM: No ascites.  VESSELS: Atherosclerotic changes.  RETROPERITONEUM/LYMPH NODES: Extensive retroperitoneal and pelvic lymphadenopathy with representative measurements as follows:  * 1.4 x 2.8 cm portacaval node (4; 155)  * 1.1 x 1.6 cm left para-aortic lymph node (4; 165)  * 1.0 x 1.6 cm left common iliac node (4; 211).  ABDOMINAL WALL: Midline postsurgical changes.  BONES: Osteopenia. Severe left hip degenerative change.    IMPRESSION:  Wall thickening of the third portion of the duodenum suspect for mass and causing gastric outlet obstruction.    Widespread axillary, mediastinal, hilar, retroperitoneal and pelvic lymphadenopathy compatible with metastatic disease.    Multiple irregular shaped opacities in both lungs; favor pneumonia. Metastatic disease is not excluded. Follow-up CT in 6 weeks after medical treatment is recommended.
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
## 2023-06-15 NOTE — CONSULT NOTE ADULT - SUBJECTIVE AND OBJECTIVE BOX
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 obtained in the distal and proximal    esophagus to eval for EoE since she is complaining of dysphagia. .      Stomach: normal. Five cold forceps bx obtained to eval for H pylori. .      Duodenum: normal. Celiac cold forceps bx (2 in the bulb, 4 in D2) obtained. .      Nemours Bowel Preparation Score:    Using the Nemours Bowel Preparation Score, each segment of the colon was graded    as follows: Left Colon: Excellent, 3 | Transverse Colon: Excellent, 3  | Right Colon: Excellent, 3    Colonoscopy Findings:    Additional findings Circumferential mass in the ascending colon, with a necrotic    component. Not completely obstructing the lumen, but concern that trying to    traverse might lead to perforation in light of extent of necrosis. 8 cold    forceps bx obtained of the lesion. There was also a 5 mm polyp distal to the    mass, which was cold snared off. The cecum was not reached. There was a small    internal hemorrhoid, seen on retroflexion, not bleeding. There were scattered    left sided tics..    Colonoscopy Impressions:    Circumferential mass in the ascending colon, with a necrotic component. Not    completely obstructing the lumen, but concern that trying to traverse might lead    to perforation in light of extent of necrosis. 8 cold forceps bx obtained of the    lesion. There was also a 5 mm polyp distal to the mass, which was cold snared    off. The cecum was not reached. There was a small internal hemorrhoid, seen on    retroflexion, not bleeding. There were scattered left sided tics. .    Plan:    Discharge to home    Await pathology results. I will contact the Oncology navigator in anticipation of this being malignant.      Version 1, Electronically signed on 3/15/2023 1:04:43 PM by Leny Benoit MD    < end of copied text >    Surgical Pathology Report:   ACCESSION No:  [id number]      Accession:                             [id number]    Collected Date/Time:                   3/15/2023 17:00 EDT  Received Date/Time:                    3/16/2023 06:33 EDT    Addendum Report - Auth (Verified)    Addendum      Immunohistochemistry Testing (IHC) for Mismatch Repair Proteins performed  on tissue block  "6-A " by [facility], (Specimen ID  #[id number]).    Comment:   Loss of nuclear expression of MLH1 and PMS2:  testing for  methylation of the MLH1 promoter and/or mutation of BRAF is indicated  (the presence of a BRAF V600E mutation and/or MLH1 methylation suggests  that the tumor is sporadic and germline evaluation is probably not  indicated; absence of both MLH1 methylation and BRAF V600E mutation  suggests the possibility of Burks syndrome, and sequencing and/or large  deletion/duplication testing of germline MLH1 may be indicated.    MLH-1:   Not Expressed  DNA Mismatch repair protein  MSH-2:  Expressed DNA Mismatch repair protein  MSH-6:  Expressed DNA Mismatch repair protein  PMS2:  Not Expressed   DNA Mismatch repair protein    Complete report from [facility], [address] [place] [address],  is on file in the Department of Pathology.    Verified by: Andrei Terrazas MD  (Electronic Signature)  Reported on: 23 11:38 EDT, [facility], [address] [place] [id number]  _________________________________________________________________    Surgical Pathology Report - Auth (Verified)  Specimen(s) Submitted    1  Biopsy D2  2  Biopsy bulb  3  Biopsy random stomach  4  Biopsy distal esophagus  5  Biopsy proximal esophagus  6  Biopsy ascending colon mass  7  Polyp ascending colon    Final Diagnosis  _1.  Duodenum, D2, biopsy:  -Small intestinal mucosa with preserved villous architecture, negative  for inflammation.    2.  Duodenal bulb, biopsy:  - Small intestinal mucosa with preserved villous architecture, negative  for inflammation.    3.  Random stomach biopsy:  -Mildreactive gastropathy, negative for H. pylori (immunohistochemical  stains).    4.  Distal esophagus, biopsy:  -Squamous esophageal mucosa with reactive changes, suggestive  with reflux.  -Columnar epithelium is not present.  - Negative for intestinalmetaplasia and negative for  dysplasia.    5  Biopsy proximal esophagus  - Squamous esophageal mucosa with reactive changes, suggestive  with reflux.  -Columnar epithelium is not present.  - Negative for intestinal metaplasia and negative for dysplasia.      6.  Biopsy ascending colon mass:  -Poorly differentiated adenocarcinoma, with signet ring cell features.      7  Polyp ascending colon:  -Tubular adenoma.      Dr Leny Benoit was notified.      Note: Controls for the immunostains are appropriate  Verified by: Andrei Lee MD  (Electronic Signature)  Reported on: 23 16:45 EDT, [facility], [address] [place] [id number]  _________________________________________________________________      GrossDescription  1. Received in formalin labeled   "biopsy D2"  are four, tan-pink, soft  tissue fragments each measuring 0.1 cm in greatest dimension. Entirely in  one cassette: 1A.    2. Received in formalin labeled   "biopsy bulb"  are two, tan-pink, soft  tissue fragments measuring 0.1 cm and 0.3 cm in greatest dimension.  Entirely in one cassette: 2A.    3. Received in formalin labeled   "biopsy random gastric"  are five, tan-  pink, soft tissue fragments ranging from 0.2 cm to 0.4 cm in greatest  dimension. Entirely in one cassette: 3A.    4. Received in formalin labeled   "biopsy distal esophagus"  are four,  tan-  pink, soft tissue fragments ranging from 0.1 cm to 0.5 cm in greatest  dimension. Entirely in one cassette: 4A.    5. Receivedin formalin labeled   "biopsy proximal esophagus"  are five,  tan-pink, soft tissue fragments ranging from 0.2 cm to 0.3 cm in greatest  dimension. Entirely in one cassette: 5A.    6. Received in formalin labeled   "biopsy ascending colon mass"   are  multiple, tan-pink, soft tissue fragments ranging from 0.1 cm to from 0.3  cm in greatest dimension. Entirely in one cassette: 6A.    7. Received in formalin labeled   "polyp ascending colon"  are four, tan-  pink, soft tissue fragments each measuring 0.3 cm in greatest dimension.  Entirely in one cassette: 7A.    Phillip Hadley 2023 09:07 AM    Disclaimer  In addition to other data that may appear on the specimen containers, all  labels have been inspected to confirm the presence of the patient's name  and date of birth.      Histological Processing Performed at [facility],  Department of Pathology, [address] [place] [address]. (03.15.23 @ 17:00)

## 2023-06-15 NOTE — H&P ADULT - HISTORY OF PRESENT ILLNESS
82 yo F with PMH of Colon cancer, s/p R extended hemicolectomy with sBR and primary anastomosis 1 month ago presents to the ED complaining of being unable to swallow for 1 week. PAtient reports that she feels that her food is being trapped in her esophagus at the level of the chest with occasional nausa and vomiting, however, not now.  Patient states that she has lkost 25 lbs in this time and she has been able to pas gas and BM.  Denies fevers, chills,  Denies chest pain, dyspnea.  Denies constipation, diarrhea. Denies headaches, dizziness, changes in vision.

## 2023-06-15 NOTE — H&P ADULT - ASSESSMENT
82 yo F s/p recent R extended hemicolectomy, now with trouble swallowing/. CT with largely distended stomach and Duodenal thickening.  PAtient is dehydrated, notede by hyponatremia, hypochloremia, and icnreased on Hgb since last admission.     ADmit to colorectal  Pain control  NPO except meds/IVF  Will contact GI for EGD  DVT   Monitor AM labs  NGT if nauseous/vomits

## 2023-06-15 NOTE — PATIENT PROFILE ADULT - FALL HARM RISK - HARM RISK INTERVENTIONS
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
## 2023-06-15 NOTE — CONSULT NOTE ADULT - ASSESSMENT
84 yo F with Hx of Colon cancer, s/p R extended hemicolectomy with SBR and primary anastomosis 1 month ago, came in ED  as she was unable to swallow for 1 week, she felt her food is being trapped in her esophagus at the level of the chest with occasional nausea and vomiting, she has been able to pas gas and BM, patient had CT chest done showed Wall thickening of the third portion of the duodenum suspect for mass and causing gastric outlet obstruction.  Widespread axillary, mediastinal, hilar, retroperitoneal and pelvic lymphadenopathy compatible with metastatic disease, Multiple irregular shaped opacities in both lungs; favor pneumonia. Metastatic disease is not excluded.      Plan:     Nausea and Vomiting with some dysphagia:  CT chest showed Wall thickening of the third portion of the duodenum suspect for mass and causing gastric outlet obstruction   NPO  IV fluids   Protonix IV   GI is following    UA suggestive of UTI: Would recommend Zosyn give Lung findings on CT scan, will follow urine cultures, will also send blood cultures     Colon cancer with Metastatic lesions : would recommend heme/Onc consult    Hypothyroidism: On Synthroid 100 mcg once a day, if can not tolerate oral would change to IV half a dose       Hx of Seizure: On Phenytoin 100 mg once a day in am and phenytoin 50 mg once a day (at bedtime), if can not tolerate oral the will give IV, will get level, will do seizure precaution.  84 yo F with Hx of Colon cancer, s/p R extended hemicolectomy with SBR and primary anastomosis 1 month ago, came in ED  as she was unable to swallow for 1 week, she felt her food is being trapped in her esophagus at the level of the chest with occasional nausea and vomiting, she has been able to pas gas and BM, patient had CT chest done showed Wall thickening of the third portion of the duodenum suspect for mass and causing gastric outlet obstruction.  Widespread axillary, mediastinal, hilar, retroperitoneal and pelvic lymphadenopathy compatible with metastatic disease, Multiple irregular shaped opacities in both lungs; favor pneumonia. Metastatic disease is not excluded.  patient walks with walker, denies any exertional dysnea or chest pain, Patient's METS is limited, her RCRI is 1, patient labs, EKG and Echo reviewed, patient is at intermediate risk for perioperative cardiovascular complication and is optimized from the medicine point of view for planned procedure.       Plan:     Nausea and Vomiting with some dysphagia:  CT chest showed Wall thickening of the third portion of the duodenum suspect for mass and causing gastric outlet obstruction   NPO  IV fluids   Protonix IV   GI is following    UA suggestive of UTI: Would recommend Zosyn give Lung findings on CT scan, will follow urine cultures, will also send blood cultures, could not rule out Pneumonia given possible finding related to Mets    Colon cancer with Metastatic lesions : would recommend heme/Onc consult    Hypothyroidism: On Synthroid 100 mcg once a day, if can not tolerate oral would change to IV half a dose     Hx of Seizure: On Phenytoin 100 mg once a day in am and phenytoin 50 mg once a day (at bedtime), if can not tolerate oral the will give IV, will get level, will do seizure precaution.     Patient is DNR, confirmed with family, patient signed the paper of DNR, she is not sure about DNR    Would benefit from Palliative care consult about of goals of care treatment, given extensive metastatic disease.     Plan of care discuss with Colorectal team and GI team, will follow along.

## 2023-06-15 NOTE — PATIENT PROFILE ADULT - VISION (WITH CORRECTIVE LENSES IF THE PATIENT USUALLY WEARS THEM):
Psychiatry Progress Note    Subjective: Interval History     The patient is lying in bed this morning  It was discussed with her that she needs to shower  Patient makes multiple excuses why she cannot do things  She states she needs a specific handicapped bathroom  She has been very somatic  Staff states last week she was stating she was not able to swallow  She has been medication compliant  She does refuse some of her meals and states she did not eat breakfast this morning  Patient has pressured speech during conversation      Behavior over the last 24 hours:  unchanged  Sleep: normal  Appetite: normal  Medication side effects: No  ROS: no complaints    Current medications:    Current Facility-Administered Medications:     acetaminophen (TYLENOL) tablet 650 mg, 650 mg, Oral, Q6H PRN, Jennifer Taveras MD    albuterol (PROVENTIL HFA,VENTOLIN HFA) inhaler 2 puff, 2 puff, Inhalation, Q4H PRN, Jennifer Taveras MD, 2 puff at 07/25/19 1200    aluminum-magnesium hydroxide-simethicone (MYLANTA) 200-200-20 mg/5 mL oral suspension 15 mL, 15 mL, Oral, Q4H PRN, Jennifer Taveras MD    ammonium lactate (LAC-HYDRIN) 12 % lotion 1 application, 1 application, Topical, BID PRN, Jennifer Taveras MD    benzonatate (TESSALON PERLES) capsule 100 mg, 100 mg, Oral, TID PRN, Jennifer Taveras MD    benztropine (COGENTIN) injection 1 mg, 1 mg, Intramuscular, Q8H PRN, Jennifer Taveras MD    cloZAPine (CLOZARIL) tablet 50 mg, 50 mg, Oral, BID, Jennifer Taveras MD, 50 mg at 09/27/19 2140    EPINEPHrine PF (ADRENALIN) 1 mg/mL injection 0 15 mg, 0 15 mg, Intramuscular, Once PRN, Jennifer Taveras MD    FLUoxetine (PROzac) capsule 20 mg, 20 mg, Oral, Daily, Jennifer Taveras MD, 20 mg at 09/28/19 0819    fluticasone-vilanterol (BREO ELLIPTA) 200-25 MCG/INH inhaler 1 puff, 1 puff, Inhalation, Daily, Jennifer Taveras MD, 1 puff at 09/28/19 0819    ketotifen (ZADITOR) 0 025 % ophthalmic solution 1 drop, 1 drop, Right Eye, BID PRN, Jennifer Taveras MD    levothyroxine tablet 125 mcg, 125 mcg, Oral, Early Morning, Dalton Garner MD, 125 mcg at 09/28/19 0605    magnesium hydroxide (MILK OF MAGNESIA) 400 mg/5 mL oral suspension 30 mL, 30 mL, Oral, Daily PRN, Dalton Garner MD    montelukast (SINGULAIR) tablet 10 mg, 10 mg, Oral, HS, Dalton Garner MD, 10 mg at 09/27/19 2140    OLANZapine (ZyPREXA) IM injection 5 mg, 5 mg, Intramuscular, Q8H PRN, Dalton Garner MD    OLANZapine (ZyPREXA) tablet 5 mg, 5 mg, Oral, Q8H PRN, Dalton Garner MD    pantoprazole (PROTONIX) EC tablet 40 mg, 40 mg, Oral, Early Morning, Dalton Garner MD, 40 mg at 09/28/19 0605    polyethylene glycol (MIRALAX) packet 17 g, 17 g, Oral, Daily PRN, Dalton Garner MD    polyvinyl alcohol (LIQUIFILM TEARS) 1 4 % ophthalmic solution 1 drop, 1 drop, Both Eyes, Q3H PRN, Dalton Garner MD    theophylline (JEF-24) 24 hr capsule 200 mg, 200 mg, Oral, Daily, Dalton Garner MD, 200 mg at 09/28/19 0819    tiotropium (SPIRIVA) capsule for inhaler 18 mcg, 18 mcg, Inhalation, Daily, Dalton Garner MD, 18 mcg at 09/28/19 0819    traZODone (DESYREL) tablet 25 mg, 25 mg, Oral, HS PRN, Dalton Garner MD    Current Problem List:    Patient Active Problem List   Diagnosis    Sepsis ([facility]    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[facility] )    Tobacco use disorder, continuous    Bipolar disorder ([facility] )    Left hip pain    Lactic acidosis    Hypokalemia    Hypomagnesemia    Compression fracture of L4 lumbar vertebra    Thoracic compression fracture (HCC)    Ventral hernia    Parapneumonic effusion    Acute on chronic respiratory failure with hypoxia (HCC)    Chronic respiratory failure (HCC)    Hypophosphatemia    Elevated MCV    Schizoaffective disorder, bipolar type ([address] )    Acquired hypothyroidism    Gastroesophageal reflux disease without esophagitis    Abnormal CT of the chest    Excessive cerumen in left ear canal    Lipoma of right upper extremity    Polydipsia    Localized swelling of both lower legs    Noncompliant with deep vein thrombosis (DVT) prophylaxis    Allergic conjunctivitis of right eye       Problem list reviewed 09/28/19     Objective:     Vital Signs:  Vitals:    09/27/19 1900 09/27/19 2140 09/28/19 0730 09/28/19 0732   BP: 96/60  131/83 119/73   BP Location: Left arm  Left arm Left arm   Pulse: 97  91 97   Resp: 16  18    Temp: (!) 97 2 °F (36 2 °C)  (!) 97 1 °F (36 2 °C)    TempSrc: Temporal  Temporal    SpO2: 91% 91%     Weight:       Height:             Appearance:  age appropriate and casually dressed   Behavior:  deviant and evasive   Speech:  pressured   Mood:  anxious   Affect:  mood-congruent   Thought Process:  circumstantial   Thought Content:  delusions  grandiose and somatic   Perceptual Disturbances: None   Risk Potential: none   Sensorium:  person, place, situation and time   Cognition:  intact   Consciousness:  alert and awake    Attention: attention span and concentration were age appropriate   Intellect: average   Insight:  poor   Judgment: poor      Motor Activity: no abnormal movements       I/O Past 24 hours:  No intake/output data recorded  No intake/output data recorded  Labs:  Reviewed 09/28/19      Assessment / Plan:     [facility]oaffective disorder, bipolar type ([facility] )    Recommended Treatment:      Medication changes:  1) continue current medication regimen  Non-pharmacological treatments  1) Continue with group therapy, milieu therapy and occupational therapy  Safety  1) Safety/communication plan established targeting dynamic risk factors above  2) Risks, benefits, and possible side effects of medications explained to patient and patient verbalizes understanding  Counseling / Coordination of Care    Total floor / unit time spent today 20 minutes  Greater than 50% of total time was spent with the patient and / or family counseling and / or coordination of care  A description of the counseling / coordination of care       Patient's Rights, confidentiality and exceptions to confidentiality, use of automated medical record, [address] [place] [address] staff access to medical record, and consent to treatment reviewed      Carissa Murillo PA-C Partially impaired: cannot see medication labels or newsprint, but can see obstacles in path, and the surrounding layout; can count fingers at arm's length

## 2023-06-15 NOTE — CONSULT NOTE ADULT - PROBLEM SELECTOR RECOMMENDATION 2
S/p R extended hemicolectomy with SBR and primary anastomosis (4/12/23)  Colon 3/15/23- circumferential mass in ascending colon with necrotic component 5mm polyp distal to mass, internal hemorrhoids, scattered diverticulosis. Pathology- poorly differentiated adenocarcinoma, with signet ring cell features; tubular adenoma.   CT chest/abd/pelvis w/ IV Cont (06.14.23)- Wall thickening of the third portion of the duodenum suspect for mass and causing gastric outlet obstruction; Widespread axillary, mediastinal, hilar, retroperitoneal and pelvic lymphadenopathy compatible with metastatic disease; Multiple irregular shaped opacities in both lungs; favor pneumonia; Metastatic disease is not excluded.    - Continue care per colorectal surgery team  - Recommend heme/onc consult for metastases   - Recommend medicine consult   _________________________________________________________________  Assessment and recommendations are final when note is signed by the attending physician.
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
## 2023-06-16 LAB
ANION GAP SERPL CALC-SCNC: 14 MMOL/L — SIGNIFICANT CHANGE UP (ref 5–17)
APTT BLD: 24 SEC — LOW (ref 27.5–35.5)
BUN SERPL-MCNC: 5.6 MG/DL — LOW (ref 8–20)
CALCIUM SERPL-MCNC: 8.1 MG/DL — LOW (ref 8.4–10.5)
CHLORIDE SERPL-SCNC: 96 MMOL/L — SIGNIFICANT CHANGE UP (ref 96–108)
CO2 SERPL-SCNC: 24 MMOL/L — SIGNIFICANT CHANGE UP (ref 22–29)
CREAT SERPL-MCNC: 0.37 MG/DL — LOW (ref 0.5–1.3)
EGFR: 99 ML/MIN/1.73M2 — SIGNIFICANT CHANGE UP
GLUCOSE SERPL-MCNC: 110 MG/DL — HIGH (ref 70–99)
HCT VFR BLD CALC: 37 % — SIGNIFICANT CHANGE UP (ref 34.5–45)
HGB BLD-MCNC: 12.8 G/DL — SIGNIFICANT CHANGE UP (ref 11.5–15.5)
INR BLD: 1.12 RATIO — SIGNIFICANT CHANGE UP (ref 0.88–1.16)
MAGNESIUM SERPL-MCNC: 2 MG/DL — SIGNIFICANT CHANGE UP (ref 1.6–2.6)
MCHC RBC-ENTMCNC: 33.3 PG — SIGNIFICANT CHANGE UP (ref 27–34)
MCHC RBC-ENTMCNC: 34.6 GM/DL — SIGNIFICANT CHANGE UP (ref 32–36)
MCV RBC AUTO: 96.4 FL — SIGNIFICANT CHANGE UP (ref 80–100)
PHOSPHATE SERPL-MCNC: 2.7 MG/DL — SIGNIFICANT CHANGE UP (ref 2.4–4.7)
PLATELET # BLD AUTO: 204 K/UL — SIGNIFICANT CHANGE UP (ref 150–400)
POTASSIUM SERPL-MCNC: 3.4 MMOL/L — LOW (ref 3.5–5.3)
POTASSIUM SERPL-SCNC: 3.4 MMOL/L — LOW (ref 3.5–5.3)
PROTHROM AB SERPL-ACNC: 13 SEC — SIGNIFICANT CHANGE UP (ref 10.5–13.4)
RBC # BLD: 3.84 M/UL — SIGNIFICANT CHANGE UP (ref 3.8–5.2)
RBC # FLD: 12.5 % — SIGNIFICANT CHANGE UP (ref 10.3–14.5)
SODIUM SERPL-SCNC: 134 MMOL/L — LOW (ref 135–145)
WBC # BLD: 7.1 K/UL — SIGNIFICANT CHANGE UP (ref 3.8–10.5)
WBC # FLD AUTO: 7.1 K/UL — SIGNIFICANT CHANGE UP (ref 3.8–10.5)

## 2023-06-16 PROCEDURE — 99233 SBSQ HOSP IP/OBS HIGH 50: CPT

## 2023-06-16 PROCEDURE — 99223 1ST HOSP IP/OBS HIGH 75: CPT

## 2023-06-16 PROCEDURE — 74018 RADEX ABDOMEN 1 VIEW: CPT | Mod: 26

## 2023-06-16 PROCEDURE — 99497 ADVNCD CARE PLAN 30 MIN: CPT | Mod: 25

## 2023-06-16 PROCEDURE — 99232 SBSQ HOSP IP/OBS MODERATE 35: CPT

## 2023-06-16 PROCEDURE — 99498 ADVNCD CARE PLAN ADDL 30 MIN: CPT | Mod: 25

## 2023-06-16 RX ORDER — ACETAMINOPHEN 500 MG
675 TABLET ORAL ONCE
Refills: 0 | Status: COMPLETED | OUTPATIENT
Start: 2023-06-16 | End: 2023-06-16

## 2023-06-16 RX ORDER — LIDOCAINE 4 G/100G
1 CREAM TOPICAL DAILY
Refills: 0 | Status: DISCONTINUED | OUTPATIENT
Start: 2023-06-16 | End: 2023-06-19

## 2023-06-16 RX ORDER — PANTOPRAZOLE SODIUM 20 MG/1
40 TABLET, DELAYED RELEASE ORAL DAILY
Refills: 0 | Status: DISCONTINUED | OUTPATIENT
Start: 2023-06-16 | End: 2023-06-16

## 2023-06-16 RX ORDER — HYDROMORPHONE HYDROCHLORIDE 2 MG/ML
0.2 INJECTION INTRAMUSCULAR; INTRAVENOUS; SUBCUTANEOUS EVERY 4 HOURS
Refills: 0 | Status: DISCONTINUED | OUTPATIENT
Start: 2023-06-16 | End: 2023-06-19

## 2023-06-16 RX ORDER — LEVOTHYROXINE SODIUM 125 MCG
60 TABLET ORAL
Refills: 0 | Status: DISCONTINUED | OUTPATIENT
Start: 2023-06-16 | End: 2023-06-19

## 2023-06-16 RX ORDER — MORPHINE SULFATE 50 MG/1
1 CAPSULE, EXTENDED RELEASE ORAL EVERY 4 HOURS
Refills: 0 | Status: DISCONTINUED | OUTPATIENT
Start: 2023-06-16 | End: 2023-06-16

## 2023-06-16 RX ORDER — DEXAMETHASONE 0.5 MG/5ML
2 ELIXIR ORAL
Refills: 0 | Status: DISCONTINUED | OUTPATIENT
Start: 2023-06-16 | End: 2023-06-19

## 2023-06-16 RX ORDER — PANTOPRAZOLE SODIUM 20 MG/1
40 TABLET, DELAYED RELEASE ORAL
Refills: 0 | Status: DISCONTINUED | OUTPATIENT
Start: 2023-06-16 | End: 2023-06-19

## 2023-06-16 RX ORDER — MORPHINE SULFATE 50 MG/1
2 CAPSULE, EXTENDED RELEASE ORAL EVERY 6 HOURS
Refills: 0 | Status: DISCONTINUED | OUTPATIENT
Start: 2023-06-16 | End: 2023-06-16

## 2023-06-16 RX ORDER — HYDROMORPHONE HYDROCHLORIDE 2 MG/ML
0.5 INJECTION INTRAMUSCULAR; INTRAVENOUS; SUBCUTANEOUS EVERY 6 HOURS
Refills: 0 | Status: DISCONTINUED | OUTPATIENT
Start: 2023-06-16 | End: 2023-06-19

## 2023-06-16 RX ADMIN — Medication 60 MICROGRAM(S): at 09:15

## 2023-06-16 RX ADMIN — HYDROMORPHONE HYDROCHLORIDE 0.2 MILLIGRAM(S): 2 INJECTION INTRAMUSCULAR; INTRAVENOUS; SUBCUTANEOUS at 21:28

## 2023-06-16 RX ADMIN — Medication 270 MILLIGRAM(S): at 06:31

## 2023-06-16 RX ADMIN — Medication 0.25 MILLIGRAM(S): at 22:46

## 2023-06-16 RX ADMIN — LIDOCAINE 1 PATCH: 4 CREAM TOPICAL at 15:45

## 2023-06-16 RX ADMIN — PIPERACILLIN AND TAZOBACTAM 25 GRAM(S): 4; .5 INJECTION, POWDER, LYOPHILIZED, FOR SOLUTION INTRAVENOUS at 13:31

## 2023-06-16 RX ADMIN — Medication 675 MILLIGRAM(S): at 07:01

## 2023-06-16 RX ADMIN — Medication 2 MILLIGRAM(S): at 18:02

## 2023-06-16 RX ADMIN — DEXTROSE MONOHYDRATE, SODIUM CHLORIDE, AND POTASSIUM CHLORIDE 100 MILLILITER(S): 50; .745; 4.5 INJECTION, SOLUTION INTRAVENOUS at 01:55

## 2023-06-16 RX ADMIN — HYDROMORPHONE HYDROCHLORIDE 0.5 MILLIGRAM(S): 2 INJECTION INTRAMUSCULAR; INTRAVENOUS; SUBCUTANEOUS at 15:45

## 2023-06-16 RX ADMIN — LIDOCAINE 1 PATCH: 4 CREAM TOPICAL at 19:00

## 2023-06-16 RX ADMIN — Medication 50 MILLIGRAM(S): at 21:26

## 2023-06-16 RX ADMIN — PIPERACILLIN AND TAZOBACTAM 25 GRAM(S): 4; .5 INJECTION, POWDER, LYOPHILIZED, FOR SOLUTION INTRAVENOUS at 05:46

## 2023-06-16 RX ADMIN — PANTOPRAZOLE SODIUM 40 MILLIGRAM(S): 20 TABLET, DELAYED RELEASE ORAL at 11:32

## 2023-06-16 RX ADMIN — PIPERACILLIN AND TAZOBACTAM 25 GRAM(S): 4; .5 INJECTION, POWDER, LYOPHILIZED, FOR SOLUTION INTRAVENOUS at 21:27

## 2023-06-16 RX ADMIN — HYDROMORPHONE HYDROCHLORIDE 0.5 MILLIGRAM(S): 2 INJECTION INTRAMUSCULAR; INTRAVENOUS; SUBCUTANEOUS at 16:00

## 2023-06-16 RX ADMIN — DEXTROSE MONOHYDRATE, SODIUM CHLORIDE, AND POTASSIUM CHLORIDE 100 MILLILITER(S): 50; .745; 4.5 INJECTION, SOLUTION INTRAVENOUS at 13:31

## 2023-06-16 NOTE — CONSULT NOTE ADULT - SUBJECTIVE AND OBJECTIVE BOX
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 EDT  Received Date/Time:                    2023 13:42 EDT    Surgical Pathology Report - Auth (Verified)    Specimen(s) Submitted  1  Terminal ileum, ascending and transverse colon with extensive cancer  and  invasion to small intestine    Final Diagnosis  1.  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  -   The tumor measures 6.5 cm in greatest dimension, located in the  ascending colon and extensively involves the appendix, small bowel and  mesentery.  -   All resection margins are free of tumor.  -   Lymphovascular invasion is present.  -   Small bowel with focal abscess formation around the foreign  material and multiple areas of chronic granulomatous inflammation around  suture like material.  -   32 out of 43 lymph nodes with metastatic carcinoma.  Some of the  lymph nodes are matted involving an area of 6.5 cm.  Verified by: Caprice Call MD  (Electronic Signature)  Reported on: 23 16:33 EDT, [facility], [address] [place] [address]  Phone: [phone]   Fax: [phone]  _________________________________________________________________      Comment  All or portions of this case have undergone intradepartmental review (3,  4).    Synoptic Summary  1: Colon and Rectum - Resection  Specimen  Procedure:  Right hemicolectomy  Tumor  Tumor Site:  Ascending colon  Histologic Type:   Poorly differentiated invasive adenocarcinoma  with signet ring cells and mucinous pools.  Histologic Grade:   G3, poorly differentiated  Tumor Size:  6.5 x 5.5 x 1.8 Centimeters (cm)  Tumor Extent:   Directly invades or adheres to adjacent  structure(s) - appendix and small bowel  Macroscopic Tumor Perforation:   Not identified  LymphovascularInvasion:   Small vessel  Perineural Invasion:   Not identified  Number of Tumor Buds:   7 per 'hotspot' field  Tumor Bud Score:   Intermediate (5-9)  Type of Polyp in which Invasive Carcinoma Arose:    None identified  Treatment Effect:   No known presurgical therapy  Margins  Margin Status for Invasive Carcinoma:    All margins negative for  invasive carcinoma  Closest Margin(s) to Invasive Carcinoma:    Radial  (circumferential) or mesenteric - 0.3  Distance from Invasive Carcinoma to Closest Margin:    0.35 cm  Distance from Invasive Carcinoma to Radial (Circumferential)  Distance already reported as closest margin  Margin:  Distance from Invasive Carcinoma to Closest Mucosal Margin:  Distance already reported as closest margin  Margin Status for Non-Invasive Tumor:    All margins negative for  high-grade dysplasia / intramucosal carcinoma and low-grade  dysplasia  Regional Lymph Nodes  Regional Lymph Node Status:   Tumor present in regional lymph  node(s)  Number of Lymph Nodes with Tumor:    32  Number of Lymph Nodes Examined:   43  Tumor Deposits:   Not identified  Pathologic Stage Classification (pTNM, AJCC 8th Edition)  pT Category:   pT4b  pN Category:   pN2b  Prior Biopsy (NAPRC Standard 2.1)  A biopsy was performed and read elsewhere and not submitted for in-  house review  Additional Findings  Additional Findings:   None identified  Best Tumor Blocks for Future Studies  Tumor Block(s):   1H    CAP [facility]  Q2 Release    Clinical Information  Ascending hepatic flexure colon cancer, anemia      Gross Description  Received:  Fresh for biobanking labeled  "terminal ileum ascending and  transverse colon with extensive cancer and invasion to small bowel"  Integrity:  Intact  Orientation:  Oriented by anatomic landmarks  Proximal:  Stapled, 3 cm in circumference  Distal:  Stapled, 6.5 cm in circumference  Terminal Ileum  Length:  97 cm  Circumference:  2.7 cm to 4 cm  Wall Thickness:  0.3 cm to 0.6 cm , there is a 1.2 x 0.4 x 0.3 cm  intramural tan-white nodule located 7 cm from the proximal margin.  Mesenteric Width:  Up to 9.5 cm  Colon  Length:  17.5 cm  Cecum Circumference:  8 cm  Colonic Circumference:   6.5 cm to 9.5 cm  Mesenteric Width:  Up to 5.7 cm  Wall Thickness:  0.5 cm  Pedicle:  Not Present  Appendix: Size:  5.5 cm in length, 0.8 cm to 1.2 cm in diameter  Appendix Appearance:  Enlarged and firm. The lumen is pinpoint and the  cut surface is tan-white and firm (3.7 cm length from distal tip).  Within the attached fat are multiple tan-white firm nodular areas.  Inking  Proximal:  Blue  Distal:  Green  Mesenteric Resection Margin:   Blue  Area of Interest -  mass  Size:  6.5 x 5.5 x 1.8 cm, tan, exophytic with nodular and raised borders  and centrally ulcerated and degenerative  Location:  Ascending colon  % Circumferential Involvement:   100%  Overlying Serosa:  Contracted  Cut Surface:  White and firm  Extension:  Extends through wall 0.5 cm into the mesenteric adipose  tissue.  Distance to Proximal Margin:   102 cm  Distance to Distal Margin:   5.5 cm  Distance to Additional Margins/Landmarks  Serosa:  0.1 cm  Mesenteric:  2.5 cm  Ileocecal Valve:  3 cm distal to ileocecal valve  Additional Comments:  The small bowel is adherent to the colon 19.5 cm  from the proximal margin. The mass is 0.5 cm from the small bowel wall  and does not appear to invade.  Remaining Mucosa:  Tan-pink with thickened and edematous mucosal folds.  Between and involving the ileocecal valve and mass are multiple nodular  flat polypoid areas ranging from 0.2 cm to 3 cm.  Remaining Serosa:  Tan-gray, smooth to shaggy with numerous adhesions.  Lumen:  No abnormality  Omentum:  Not present  Attached Mesentery:  Yellow-tan, hemorrhagic with numerous adhesions and  tan firm nodules (serosal and within adhesions) ranging from 0.2 cm to  0.4 cm.  Possible Lymph Node(s):   Multiple , 0.1 cm to 5 cm in greatest dimension    Cut surfaces:  Tan-white and firm to degenerative to pink-tan and rubbery  Additional Findings:   There are multiple tan-white firm lymph nodes  immediately adjacent to the mass. Within the small bowel and colon  mesentery is a 6.5 x 5 x 3 cm area of multiple matted tan-white and  firm to degenerative positive lymph nodes. The matted lymph nodes are  2 cm from the mass, 1.5 cm from the colon mesenteric margin, and extend  to the appendix mesentery and abuts the appendix. There is a 2.2 cm  positive lymph node at the small bowel mesenteric margin. Fresh tissue is  submitted for biobanking.  Submitted:  Representative sections in 30 cassettes:  1A: Proximal margin, representative perpendicular section  1B: Distal margin, representative perpendicular section  1C: Small bowel with representative of intramural nodule  1D-1E: Small bowel adherent to colon with adjacent mass, bisected  section in each  1F: Representative of nodular areas at ileocecal valve and in between  ileocecal valve and mass  1G: Mass with serosa  1H-1I: Mass with representative of adjacent tan-white firm lymph nodes  (bisected section in cassette 1I)  1J:Closest mesenteric margin to mass, en face  1K: Representative section of matted positive lymph nodes  1L: Appendix, distal tip  1M: Appendix, remainder of distal tip and representative cross-section  of appendix to include adjacent tan-white firm nodular areas within  mesenteric fat  1N: Mesenteric serosal nodule and nodule within adhesions  1O: Positive lymph node at small bowel mesenteric margin, representative  perpendicular section  1P: Additional representative section of matted positive lymph nodes  1Q-1R: Three lymph nodes in each  1S-1U: Six lymph nodes in each  1V-1AA: One lymph node bisected in each  1AB-1AD: Representative sections from positive lymph nodes    Nidia Tubbs 2023 12:24 PM    Disclaimer  In addition to other data that may appear on the specimen containers, all  labels have been inspected to confirm the presence of the patient's name  and date of birth. (23 @ 10:30)

## 2023-06-16 NOTE — PROGRESS NOTE ADULT - SUBJECTIVE AND OBJECTIVE BOX
MARTINA BURROUGHS    [id number]    83y      Female    Patient is a 83y old  Female who presents with a chief complaint of GOO (2023 12:27)      INTERVAL HPI/OVERNIGHT EVENTS:    Patient is s/p NG tube placement, denies fever, chills, chest pain, sob or cough      Vital Signs Last 24 Hrs  T(C): 36.7 (2023 07:25), Max: 36.7 (2023 07:25)  T(F): 98.1 (2023 07:25), Max: 98.1 (2023 07:25)  HR: 71 (2023 07:25) (71 - 77)  BP: 155/69 (2023 07:25) (130/64 - 180/79  RR: 18 (2023 07:25) (18 - 18)  SpO2: 93% (2023 07:25) (93% - 94%)    Parameters below as of 2023 07:25  Patient On (Oxygen Delivery Method): room air        PHYSICAL EXAM:    GENERAL: Elderly cachetic female looking comfortable    HEENT: has NG placed   NECK: soft, Supple, No JVD  CHEST/LUNG: Clear to auscultate bilaterally; No wheezing  HEART: S1S2+, Regular rate and rhythm; No murmurs  ABDOMEN: Soft, Nontender, Nondistended; Bowel sounds present  EXTREMITIES:  1+ Peripheral Pulses, No edema  SKIN: No rashes or lesions  NEURO: AAOX3  PSYCH: normal mood      LABS:                        12.8   7.10  )-----------( 204      ( 2023 08:20 )             37.0     06-16    134<L>  |  96  |  5.6<L>  ----------------------------<  110<H>  3.4<L>   |  24.0  |  0.37<L>    Ca    8.1<L>      2023 08:20  Phos  2.7     06-16  Mg     2.0     06-16    TPro  7.9  /  Alb  4.3  /  TBili  0.3<L>  /  DBili  x   /  AST  21  /  ALT  18  /  AlkPhos  196<H>  06-14    PT/INR - ( 2023 08:20 )   PT: 13.0 sec;   INR: 1.12 ratio         PTT - ( 2023 08:20 )  PTT:24.0 sec  Urinalysis Basic - ( 2023 20:37 )    Color: Yellow / Appearance: very cloudy / S.010 / pH: x  Gluc: x / Ketone: Trace  / Bili: Negative / Urobili: Negative   Blood: x / Protein: 300 mg/dL / Nitrite: Positive   Leuk Esterase: Moderate / RBC: 6-10 /HPF / WBC 26-50 /HPF   Sq Epi: x / Non Sq Epi: x / Bacteria: Moderate          I&O's Summary    15 Robert 2023 07:01  -  2023 07:00  --------------------------------------------------------  IN: 400 mL / OUT: 300 mL / NET: 100 mL        MEDICATIONS  (STANDING):  benzocaine 20% Spray 1 Spray(s) Topical once  dextrose 5% + sodium chloride 0.9% with potassium chloride 20 mEq/L 1000 milliLiter(s) (100 mL/Hr) IV Continuous <Continuous>  levothyroxine Injectable 60 MICROGram(s) IV Push <User Schedule>  pantoprazole  Injectable 40 milliGRAM(s) IV Push daily  phenytoin   Capsule 100 milliGRAM(s) Oral daily  phenytoin   Chewable 50 milliGRAM(s) Chew at bedtime  piperacillin/tazobactam IVPB.. 3.375 Gram(s) IV Intermittent every 8 hours    MEDICATIONS  (PRN):  ondansetron Injectable 4 milliGRAM(s) IV Push every 6 hours PRN Nausea

## 2023-06-16 NOTE — CONSULT NOTE ADULT - ASSESSMENT
84 yo F with recently diagnosed colon cancer s/p R extended hemicolectomy with SBR and primary anastomosis on 4/12/23.   She's currently admitted for gastric outlet obstruction.  6/14/23 CT c/a/p - wall thickening of 3rd portion of duodenum, suspect mass causing GOO. Wide spread lymphadenopathy -axillary, mediastinal, hilar, retroperitoneal, pelvic c/w metastatic disease. Multiple irregular shaped opacities in both lungs favoring pneumonia, metastatic disease not excluded.     #gastric outlet obstruction  #lymphadenopathy  #colon cancer stage IIIB (pT4bN2)- s/p surgery on 4/12/23    -reviewed CT c/a/p - extensive lymphadenopathy. Per review of HIE - has a history of right axillary LN biopsy on 11/5/21 - atypical lymphoid proliferation. So unclear if all of the LAD is secondary to metastatic disease vs. a separate process  -GOO- seen by GI, there is likely mass effect from LAD that is causing GOO, hence no plan for stenting at this time.  Plan is for venting G-tube and feeding J tube    IN PROGRESS   84 yo F with recently diagnosed colon cancer s/p R extended hemicolectomy with SBR and primary anastomosis on 4/12/23.   She's currently admitted for gastric outlet obstruction.  6/14/23 CT c/a/p - wall thickening of 3rd portion of duodenum, suspect mass causing GOO. Wide spread lymphadenopathy -axillary, mediastinal, hilar, retroperitoneal, pelvic c/w metastatic disease. Multiple irregular shaped opacities in both lungs favoring pneumonia, metastatic disease not excluded.     #gastric outlet obstruction  #lymphadenopathy  #colon cancer stage IIIB (pT4bN2)- s/p surgery on 4/12/23    -reviewed CT c/a/p - extensive lymphadenopathy. Per review of HIE - has a history of right axillary LN biopsy on 11/5/21 - atypical lymphoid proliferation. So unclear if all of the LAD is secondary to metastatic disease vs. a separate process  -GOO- seen by GI, there is likely mass effect from LAD that is causing GOO. Patient declining venting G-tube and feeding J tube. Possible stent Monday  -extensive discussion with daughter and grand-daughter, discussed potential dose reduce chemotherapy such as FOLFOX  / XELOX but does still come with risk of toxicity in elderly patients, plus her GOO is likely to be an ongoing issue. Per family, patient's wishes are to focus on QOL rather than quantity.  Palliative care is following.  Please call with questions.

## 2023-06-16 NOTE — PROGRESS NOTE ADULT - ASSESSMENT
84 yo F with Hx of Colon cancer, s/p R extended hemicolectomy with SBR and primary anastomosis 1 month ago, came in ED  as she was unable to swallow for 1 week, she felt her food is being trapped in her esophagus at the level of the chest with occasional nausea and vomiting, she has been able to pas gas and BM, patient had CT chest done showed Wall thickening of the third portion of the duodenum suspect for mass and causing gastric outlet obstruction.  Widespread axillary, mediastinal, hilar, retroperitoneal and pelvic lymphadenopathy compatible with metastatic disease, Multiple irregular shaped opacities in both lungs; favor pneumonia. Metastatic disease is not excluded.  patient walks with walker, denies any exertional dysnea or chest pain, Patient's METS is limited, her RCRI is 1, patient labs, EKG and Echo reviewed, patient is at intermediate risk for perioperative cardiovascular complication and is optimized from the medicine point of view for planned procedure.       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   CT chest showed Wall thickening of the third portion of the duodenum suspect for mass and causing gastric outlet obstruction  s/p EGD and had biopsies done  GI is following   NG in place on low intermittent suction   NPO  IV fluids   Protonix IV       UA suggestive of UTI: Would recommend Zosyn give Lung findings on CT scan, urine cultures and blood cultures pending, could not rule out Pneumonia given possible finding related to Mets    Colon cancer with Metastatic lesions : heme/Onc is following     Hypothyroidism: On Synthroid 100 mcg once a day, if can not tolerate oral would change to IV half a dose     Hx of Seizure: On Phenytoin 100 mg once a day in am and phenytoin 50 mg once a day (at bedtime), if can not tolerate oral the will give IV, will get level, will do seizure precaution.     Patient is DNR, confirmed with family, patient signed the paper of DNR, she is not sure about DNR, palliative care is following, patient do not want any aggressive measure, family meeting with palliative care team later on today.

## 2023-06-16 NOTE — PROGRESS NOTE ADULT - SUBJECTIVE AND OBJECTIVE BOX
INTERVAL HPI/OVERNIGHT EVENTS:    Patient evaluated at bedside. NAOE. Patient denies N/V/CP/SOB. Endorses general malaise this morning, and "everything hurts." NGT in place with 300cc output overnight.    MEDICATIONS  (STANDING):  benzocaine 20% Spray 1 Spray(s) Topical once  dextrose 5% + sodium chloride 0.9% with potassium chloride 20 mEq/L 1000 milliLiter(s) (100 mL/Hr) IV Continuous <Continuous>  levothyroxine Injectable 60 MICROGram(s) IV Push <User Schedule>  pantoprazole  Injectable 40 milliGRAM(s) IV Push daily  phenytoin   Capsule 100 milliGRAM(s) Oral daily  phenytoin   Chewable 50 milliGRAM(s) Chew at bedtime  piperacillin/tazobactam IVPB.. 3.375 Gram(s) IV Intermittent every 8 hours    MEDICATIONS  (PRN):  ondansetron Injectable 4 milliGRAM(s) IV Push every 6 hours PRN Nausea      Vital Signs Last 24 Hrs  T(C): 36.6 (2023 04:00), Max: 36.9 (15 Robert 2023 08:56)  T(F): 97.8 (2023 04:00), Max: 98.5 (15 Robert 2023 08:56)  HR: 73 (2023 04:00) (73 - 77)  BP: 167/71 (2023 04:00) (130/64 - 180/79)  BP(mean): --  RR: 18 (2023 04:00) (18 - 18)  SpO2: 94% (2023 04:00) (94% - 95%)    Parameters below as of 2023 04:00  Patient On (Oxygen Delivery Method): room air      Constitutional: NAD  HEENT: PERRLA, EOMI, no drainage or redness; NGT in place  Respiratory: respirations even, unlabored on room air  Cardiovascular: RRR  Gastrointestinal: Soft, non-tender, non-distended  Extremities: No peripheral edema, No cyanosis, clubbing   Vascular: Equal and normal pulses: 2+ peripheral pulses throughout  Neurological: A&O x 3; no sensory, motor or coordination deficits, normal reflexes  Psychiatric: Normal mood, normal affect  Musculoskeletal: No joint pain, swelling or deformity; no limitation of movement  Skin: No rashes    I&O's Detail    15 Robert 2023 07:01  -  2023 07:00  --------------------------------------------------------  IN:    dextrose 5% + sodium chloride 0.9% w/ Additives: 400 mL  Total IN: 400 mL    OUT:    Nasogastric/Oral tube (mL): 300 mL  Total OUT: 300 mL    Total NET: 100 mL          LABS:                        11.7   7.99  )-----------( 223      ( 15 Robert 2023 07:07 )             34.5     06-15    134<L>  |  97  |  9.8  ----------------------------<  116<H>  3.8   |  26.0  |  0.45<L>    Ca    8.6      15 Robert 2023 07:07  Phos  3.2     06-15  Mg     1.7     06-15    TPro  7.9  /  Alb  4.3  /  TBili  0.3<L>  /  DBili  x   /  AST  21  /  ALT  18  /  AlkPhos  196<H>  06-14    PT/INR - ( 2023 17:36 )   PT: 13.1 sec;   INR: 1.13 ratio         PTT - ( 2023 17:36 )  PTT:26.8 sec  Urinalysis Basic - ( 2023 20:37 )    Color: Yellow / Appearance: very cloudy / S.010 / pH: x  Gluc: x / Ketone: Trace  / Bili: Negative / Urobili: Negative   Blood: x / Protein: 300 mg/dL / Nitrite: Positive   Leuk Esterase: Moderate / RBC: 6-10 /HPF / WBC 26-50 /HPF   Sq Epi: x / Non Sq Epi: x / Bacteria: Moderate

## 2023-06-16 NOTE — CONSULT NOTE ADULT - CONVERSATION DETAILS
Met with patient who appeared distress about the possibility of a tube in her stomach.  She is aware of her condition and repeatedly verbalized that she does not want to have a tube.  She understands that this can limit her time, but feels that a tube would be a major imposition to her QOL. I encouraged her to share her feelings with her daughters. We will support her in her decisions.   She named her daughter Herminia as her HCP Met with patient who appeared distress about the possibility of a tube in her stomach.  She is aware of her condition and repeatedly verbalized that she does not want to have a tube.  She understands that this can limit her time, but feels that a tube would be a major imposition to her QOL. I encouraged her to share her feelings with her daughters. We will support her in her decisions.   She named her daughter Herminia as her HCP    Second meeting  Patient Daughter Sally, and granddaughters present  Reviewed options - venting PEG, J tube.  Patient does not want such interventions.  (Spoke to patient's daughters as well)   Patient  does not want interventions wants  to be comfortable, for whatever time she has.   Discussed hospice  Reviewed plan of care for symptom management.    Patient verbalized she had a chance to think about things and does not want to be intubated.  Wishes to sign the form

## 2023-06-16 NOTE — PROGRESS NOTE ADULT - ASSESSMENT
82 yo F s/p recent R extended hemicolectomy, now with trouble swallowing. CT with largely distended stomach and Duodenal thickening. EGD per GI demonstrating obstructing duodenal lesion seen at the duodenal sweep which did not allow the scope to pass beyond, causing GOO. NGT in place, plan for repeat scope today and possible stent placement.    Plan:  Pain control  NPO except meds/IVF  NGT  DVT - chem ppx held for GI scope  f/u EGD for possible stent placement today  Monitor AM labs

## 2023-06-16 NOTE — PROGRESS NOTE ADULT - ASSESSMENT
Complicated scenario in setting of recent colon surgery with gastric outlet obstruction. There is evidence of lymphadenopathy with most likely extrinsic compression. After careful review and multiple discussions, patient will be better served with venting G tube and feeding J tube. No EGD with stent to be performed. Oncology and surgical oncology team to be involved. Palliative care evaluation. Extensive time was spent on discussion and management planning

## 2023-06-16 NOTE — CONSULT NOTE ADULT - ASSESSMENT
83 yr woman  with recently diagnosed colon cancer s/p R extended hemicolectomy with SBR and primary anastomosis on 4/12/23 admitted with gastric outlet obstruction     GOO  per GI unable to do stent - extrinsic obstruction from lymphadenopathy  Surgical onc for J tube  NGT for decompression  PPI  Can consider Decadron  4mg IVP q12   patient verbalizing she would not want J tube    Colon Cancer  Oncology consulted    Nausea  Zofran PRN      UTI/PNA  IV ABX per medicine    Encounter for Palliative Care  Palliative Care consulted to assist with goals of care  See [facility] note above for details.    Patient names her daughter Herminia as her HCP  In summary, patient verbalizing that she would NOT want any tubes in her stomach. She understands that this can limit her time, but feels that a tube would be a major imposition to her QOL.  She was recommended to also share her feelings with her daughter           83 yr woman  with recently diagnosed colon cancer s/p R extended hemicolectomy with SBR and primary anastomosis on 4/12/23 admitted with gastric outlet obstruction     GOO  per GI unable to do stent - extrinsic obstruction from lymphadenopathy  Surgical onc for J tube  NGT for decompression  PPI  Decadron  2mg IVP q12   patient verbalizing she would not want any tubes     Colon Cancer  Oncology consulted  Patient does not want further interventions    Cancer Related Pain  Dilaudid 0.2/0.5  mg IVP mod/severe pain    Nausea  Zofran  IVP PRN    UTI/PNA  IV ABX per medicine    Encounter for Palliative Care  Palliative Care consulted to assist with goals of care  See [facility] note above for details.    Patient names her daughter Herminia as her HCP  In summary, patient verbalizing that she would NOT want any tubes in her stomach. She understands that this can limit her time, but feels that a tube would be a major imposition to her QOL.  Discussed hospice services- likely will need inpatient  For now will continue current medical management  Medications for symptoms added.  NO ICU upgrades  Patent wishes DNR and DNI      ADDENDUM   Informed by surgery Dr. Raphael Chowdary  Patient now agrees to stent as a palliative measure.        83 yr woman  with recently diagnosed colon cancer s/p R extended hemicolectomy with SBR and primary anastomosis on 4/12/23 admitted with gastric outlet obstruction     GOO  per GI unable to do stent - extrinsic obstruction from lymphadenopathy  Surgical onc for J tube  NGT for decompression  PPI  Decadron  2mg IVP q12   patient verbalizing she would not want any tubes     Colon Cancer  Oncology consulted  Patient does not want further interventions    Cancer Related Pain  Dilaudid 0.2/0.5  mg IVP mod/severe pain  Lidocaine patch to back - reposition     Anxiety / Insomnia  Ativan 0.25mg IVP bid PRN - increase to 0.5mg IVP PRN if current dose not effective    Nausea  Zofran  IVP PRN    UTI/PNA  IV ABX per medicine    Encounter for Palliative Care  Palliative Care consulted to assist with goals of care  See [facility] note above for details.    Patient names her daughter Herminia as her HCP  In summary, patient verbalizing that she would NOT want any tubes in her stomach. She understands that this can limit her time, but feels that a tube would be a major imposition to her QOL.  Discussed hospice services- likely will need inpatient  For now will continue current medical management  Medications for symptoms added.  NO ICU upgrades  Patent wishes DNR and DNI      ADDENDUM   Informed by surgery Dr. Raphael Chowdary  Patient now agrees to stent as a palliative measure.

## 2023-06-16 NOTE — CONSULT NOTE ADULT - SUBJECTIVE AND OBJECTIVE BOX
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     FAMILY HISTORY:  FH: heart attack (Father)    FH: type 2 diabetes (Grandparent)        Allergies    No Known Allergies    Intolerances        http://npcrc.org/files/[email or web address]_[email or web address]    Present Symptoms:   Dyspnea:  No   Nausea/Vomiting:  slight  Anxiety:    Yes having insomnia   Depression: No  Fatigue: No   Loss of appetite: Yes   Constipation:  Not  Pain:  yes, abdomen and back             Location            Character cramp               Duration on /off              Factors             Severity mod/severe             Effect    Pain AD Score:  [email or web address] (press ctrl + left click to view)    Review of Systems: Reviewed    see HPI  All other ROS negative    MEDICATIONS  (STANDING):  benzocaine 20% Spray 1 Spray(s) Topical once  dextrose 5% + sodium chloride 0.9% with potassium chloride 20 mEq/L 1000 milliLiter(s) (100 mL/Hr) IV Continuous <Continuous>  levothyroxine Injectable 60 MICROGram(s) IV Push <User Schedule>  pantoprazole  Injectable 40 milliGRAM(s) IV Push daily  phenytoin   Capsule 100 milliGRAM(s) Oral daily  phenytoin   Chewable 50 milliGRAM(s) Chew at bedtime  piperacillin/tazobactam IVPB.. 3.375 Gram(s) IV Intermittent every 8 hours    MEDICATIONS  (PRN):  ondansetron Injectable 4 milliGRAM(s) IV Push every 6 hours PRN Nausea      PHYSICAL EXAM:    Vital Signs Last 24 Hrs  T(C): 36.7 (2023 07:25), Max: 36.7 (2023 07:25)  T(F): 98.1 (2023 07:25), Max: 98.1 (2023 07:25)  HR: 71 (2023 07:25) (71 - 77)  BP: 155/69 (2023 07:25) (130/64 - 180/79)  BP(mean): --  RR: 18 (2023 07:25) (18 - 18)  SpO2: 93% (2023 07:25) (93% - 94%)    Parameters below as of 2023 07:25  Patient On (Oxygen Delivery Method): room air        Karnofsky  50  %  General:  Elderly woman NAD  HEENT: NCAT     (   x ) NGT  Lungs: comfortable  CV: RR  GI: soft NT  MSK: normal    Neuro: no focal deficits  Skin: warm dry  Psych: anxious    LABS:                        12.8   7.10  )-----------( 204      ( 2023 08:20 )             37.0     06-16    134<L>  |  96  |  5.6<L>  ----------------------------<  110<H>  3.4<L>   |  24.0  |  0.37<L>    Ca    8.1<L>      2023 08:20  Phos  2.7     06-16  Mg     2.0     06-16    TPro  7.9  /  Alb  4.3  /  TBili  0.3<L>  /  DBili  x   /  AST  21  /  ALT  18  /  AlkPhos  196<H>  06-14    PT/INR - ( 2023 08:20 )   PT: 13.0 sec;   INR: 1.12 ratio         PTT - ( 2023 08:20 )  PTT:24.0 sec  Urinalysis Basic - ( 2023 20:37 )    Color: Yellow / Appearance: very cloudy / S.010 / pH: x  Gluc: x / Ketone: Trace  / Bili: Negative / Urobili: Negative   Blood: x / Protein: 300 mg/dL / Nitrite: Positive   Leuk Esterase: Moderate / RBC: 6-10 /HPF / WBC 26-50 /HPF   Sq Epi: x / Non Sq Epi: x / Bacteria: Moderate      I&O's Summary    15 Robert 2023 07:01  -  2023 07:00  --------------------------------------------------------  IN: 400 mL / OUT: 300 mL / NET: 100 mL        RADIOLOGY & ADDITIONAL STUDIES:    < from: CT Chest w/ IV Cont (23 @ 22:40) >    ACC: [id number] EXAM:  CT CHEST IC   ORDERED BY: GRACIELA VILLEGAS     PROCEDURE DATE:  2023          INTERPRETATION:  CLINICAL INFORMATION: History of cancer, difficulty   swallowing.    COMPARISON: Abdominal CT 2014    CONTRAST/COMPLICATIONS:  IV Contrast: IV contrast documented in unlinked concurrent exam  95 cc   administered   5 cc discarded  Oral Contrast: NONE  Complications: None reported at time of study completion    PROCEDURE:  CT of the Chest, Abdomen and Pelvis was performed.  Sagittal and coronal reformats were performed.    FINDINGS:  CHEST:  LUNGS AND LARGE AIRWAYS: Patent central airways. Multiple irregular   shaped opacities throughout both lungs with surrounding reticulonodular   opacity compatible with airways impaction.  PLEURA: No pleural effusion.  VESSELS: Atherosclerotic changes of the aorta and coronary arteries.  HEART: Heart size is normal. No pericardial effusion.  MEDIASTINUM AND KODY: Extensive mediastinal and hilar lymphadenopathy   with representative measurements as follows:  *  3.6 x 3.5 cm necrotic right hilar lymph node (4; 66)  *  2.6 x 1.7 cm necrotic left hilar lymph node ((4; 71).  *  1.5 x 2.3 cm right lower paratracheal node (4; 54)  CHEST WALL AND LOWER NECK: Extensive left cervical and supraclavicular   lymphadenopathy as described on CT neck performed at the same time.   Bilateral axillary lymphadenopathy measuring up to 2.4 x 1.8 cm on the   right (4; 50) and 1.6 x 2.0 cm on the left (4; 37).    ABDOMEN AND PELVIS:  LIVER:2.3 cm posterior right hepatic lobe hemangioma. Subcentimeter   hypodense right hepatic lobe lesion which is too small to characterize.  BILE DUCTS: Normal caliber.  GALLBLADDER: Within normal limits.  SPLEEN: Within normal limits.  PANCREAS: Within normal limits.  ADRENALS: Within normal limits.  KIDNEYS/URETERS: Within normal limits.    BLADDER: Underdistended, limiting evaluation.  REPRODUCTIVE ORGANS: Calcified uterine fibroid. No adnexal mass.    BOWEL: Circumferential wall thickening of thethird portion of the   duodenum with dilatation of the stomach and proximal duodenum. Right   colon resection and ileocolic anastomosis.  PERITONEUM: No ascites.  VESSELS: Atherosclerotic changes.  RETROPERITONEUM/LYMPH NODES: Extensive retroperitoneal and pelvic   lymphadenopathy with representative measurements as follows:  *  1.4 x 2.8 cm portacaval node (4; 155)  *  1.1 x 1.6 cm left para-aortic lymph node (4; 165)  *  1.0 x 1.6 cm left common iliac node (4; 211).  ABDOMINAL WALL: Midline postsurgical changes.  BONES: Osteopenia. Severe left hip degenerative change.    IMPRESSION:  Wall thickening of the third portion of the duodenum suspect for mass and   causing gastric outlet obstruction.    Widespread axillary, mediastinal, hilar, retroperitoneal and pelvic   lymphadenopathy compatible with metastatic disease.    Multiple irregular shaped opacities in both lungs; favor pneumonia.   Metastatic disease is not excluded. Follow-up CT in 6 weeks after medical   treatment is recommended.        --- End of Report ---            WILBERTO RAMIRES MD; Attending Radiologist  This document has been electronically signed. Robert 15 2023  9:55AM    < end of copied text >    ADVANCE DIRECTIVES/TREATMENT PREFERENCES:  DNR - MOLST completed

## 2023-06-16 NOTE — PROGRESS NOTE ADULT - SUBJECTIVE AND OBJECTIVE BOX
INTERVAL HPI/OVERNIGHT EVENTS:Patient seen and examined.Has NG tube in place. Draining dark material. Discussed with radiology team and also the attending surgeon Dr Juarez. Spoke to her daughter Herminia on phone.     MEDICATIONS  (STANDING):  benzocaine 20% Spray 1 Spray(s) Topical once  dextrose 5% + sodium chloride 0.9% with potassium chloride 20 mEq/L 1000 milliLiter(s) (100 mL/Hr) IV Continuous <Continuous>  levothyroxine Injectable 60 MICROGram(s) IV Push <User Schedule>  pantoprazole  Injectable 40 milliGRAM(s) IV Push daily  phenytoin   Capsule 100 milliGRAM(s) Oral daily  phenytoin   Chewable 50 milliGRAM(s) Chew at bedtime  piperacillin/tazobactam IVPB.. 3.375 Gram(s) IV Intermittent every 8 hours    MEDICATIONS  (PRN):  ondansetron Injectable 4 milliGRAM(s) IV Push every 6 hours PRN Nausea      Allergies    No Known Allergies    Intolerances        Vital Signs Last 24 Hrs  T(C): 36.7 (2023 07:25), Max: 36.7 (15 Robert 2023 11:10)  T(F): 98.1 (2023 07:25), Max: 98.1 (2023 07:25)  HR: 71 (2023 07:25) (71 - 77)  BP: 155/69 (2023 07:25) (130/64 - 180/79)  BP(mean): --  RR: 18 (2023 07:25) (18 - 18)  SpO2: 93% (2023 07:25) (93% - 94%)    Parameters below as of 2023 07:25  Patient On (Oxygen Delivery Method): room air        LABS:                        12.8   7.10  )-----------( 204      ( 2023 08:20 )             37.0     06-16    134<L>  |  96  |  5.6<L>  ----------------------------<  110<H>  3.4<L>   |  24.0  |  0.37<L>    Ca    8.1<L>      2023 08:20  Phos  2.7     06-16  Mg     2.0     06-16    TPro  7.9  /  Alb  4.3  /  TBili  0.3<L>  /  DBili  x   /  AST  21  /  ALT  18  /  AlkPhos  196<H>      PT/INR - ( 2023 08:20 )   PT: 13.0 sec;   INR: 1.12 ratio         PTT - ( 2023 08:20 )  PTT:24.0 sec  Urinalysis Basic - ( 2023 20:37 )    Color: Yellow / Appearance: very cloudy / S.010 / pH: x  Gluc: x / Ketone: Trace  / Bili: Negative / Urobili: Negative   Blood: x / Protein: 300 mg/dL / Nitrite: Positive   Leuk Esterase: Moderate / RBC: 6-10 /HPF / WBC 26-50 /HPF   Sq Epi: x / Non Sq Epi: x / Bacteria: Moderate        RADIOLOGY & ADDITIONAL TESTS:< from: CT Abdomen and Pelvis w/ IV Cont (23 @ 22:40) >    ACC: [id number] EXAM:  CT ABDOMEN AND PELVIS IC   ORDERED BY: BEHZAD GÓMEZ     PROCEDURE DATE:  2023          INTERPRETATION:  CLINICAL INFORMATION: History of cancer, difficulty   swallowing.    COMPARISON: Abdominal CT 2014    CONTRAST/COMPLICATIONS:  IV Contrast: IV contrast documented in unlinked concurrent exam  Oral Contrast: NONE  Complications: None reported at time of study completion    PROCEDURE:  CT of the Chest, Abdomen and Pelvis was performed.  Sagittal and coronal reformats were performed.    FINDINGS:  CHEST:  LUNGS AND LARGE AIRWAYS: Patent central airways. Multiple irregular   shaped opacities throughout both lungs with surrounding reticulonodular   opacity compatible with airways impaction.  PLEURA: No pleural effusion.  VESSELS: Atherosclerotic changes of the aorta and coronary arteries.  HEART: Heart size is normal. No pericardial effusion.  MEDIASTINUM AND KODY: Extensive mediastinal and hilar lymphadenopathy   with representative measurements as follows:  *  3.6 x 3.5 cm necrotic right hilar lymph node (4; 66)  *  2.6 x 1.7 cm necrotic left hilar lymph node ((4; 71).  *  1.5 x 2.3 cm right lower paratracheal node (4; 54)  CHEST WALL AND LOWER NECK: Extensive left cervical and supraclavicular   lymphadenopathy as described on CT neck performed at the same time.   Bilateral axillary lymphadenopathy measuring up to 2.4 x 1.8 cm on the   right (4; 50) and 1.6 x 2.0 cm on the left (4; 37).    ABDOMEN AND PELVIS:  LIVER: 2.3 cm posterior right hepatic lobe hemangioma. Subcentimeter   hypodense right hepatic lobe lesion which is too small to characterize.  BILE DUCTS: Normal caliber.  GALLBLADDER: Within normal limits.  SPLEEN: Within normal limits.  PANCREAS: Within normal limits.  ADRENALS: Within normal limits.  KIDNEYS/URETERS: Within normal limits.    BLADDER: Underdistended, limiting evaluation.  REPRODUCTIVE ORGANS: Calcified uterine fibroid. No adnexal mass.    BOWEL: Circumferential wall thickening of the third portion of the   duodenum with dilatation of the stomach and proximal duodenum. Right   colon resection and ileocolic anastomosis.  PERITONEUM: No ascites.  VESSELS: Atherosclerotic changes.  RETROPERITONEUM/LYMPH NODES: Extensive retroperitoneal and pelvic   lymphadenopathy with representative measurements as follows:  *  1.4 x 2.8 cm portacaval node (4; 155)  *  1.1 x 1.6 cm left para-aortic lymph node (4; 165)  *  1.0 x 1.6 cm left common iliac node (4; 211).  ABDOMINAL WALL: Midline postsurgical changes.  BONES: Osteopenia. Severe left hip degenerative change.    IMPRESSION:  Wall thickening of the third portion of the duodenum suspect for mass and   causing gastric outlet obstruction.    Widespread axillary, mediastinal, hilar, retroperitoneal and pelvic   lymphadenopathy compatible with metastatic disease.    Multiple irregular shaped opacities in both lungs; favor pneumonia.   Metastatic disease is not excluded. Follow-up CT in 6 weeks after medical   treatment is recommended.        --- End of Report ---            WILBERTO RAMIRES MD; Attending Radiologist  This document has been electronically signed. Robert 15 2023  9:56AM    < end of copied text >

## 2023-06-16 NOTE — CONSULT NOTE ADULT - TIME BILLING
Time spent with review of chart documents, labs, imaging. Direct patient assessment,  formulation of care plan. Discussion with  Interdisciplinary  team  Dr. Raphael duque,  Dr. Weber, RN, patient and family  including ACP  _80____minutes with   _____ completion. Time spent with review of chart documents, labs, imaging. Direct patient assessment,  formulation of care plan. Discussion with  Interdisciplinary  team  Dr. Raphael duque,  Dr. Weber, RN, patient and family  including ACP  _80____minutes with   _MOLST_ completion.

## 2023-06-17 LAB
ANION GAP SERPL CALC-SCNC: 11 MMOL/L — SIGNIFICANT CHANGE UP (ref 5–17)
BUN SERPL-MCNC: 6.1 MG/DL — LOW (ref 8–20)
CALCIUM SERPL-MCNC: 8.4 MG/DL — SIGNIFICANT CHANGE UP (ref 8.4–10.5)
CHLORIDE SERPL-SCNC: 99 MMOL/L — SIGNIFICANT CHANGE UP (ref 96–108)
CO2 SERPL-SCNC: 26 MMOL/L — SIGNIFICANT CHANGE UP (ref 22–29)
CREAT SERPL-MCNC: 0.4 MG/DL — LOW (ref 0.5–1.3)
EGFR: 98 ML/MIN/1.73M2 — SIGNIFICANT CHANGE UP
GLUCOSE SERPL-MCNC: 129 MG/DL — HIGH (ref 70–99)
HCT VFR BLD CALC: 36.5 % — SIGNIFICANT CHANGE UP (ref 34.5–45)
HGB BLD-MCNC: 12.4 G/DL — SIGNIFICANT CHANGE UP (ref 11.5–15.5)
MAGNESIUM SERPL-MCNC: 2.1 MG/DL — SIGNIFICANT CHANGE UP (ref 1.8–2.6)
MCHC RBC-ENTMCNC: 32.9 PG — SIGNIFICANT CHANGE UP (ref 27–34)
MCHC RBC-ENTMCNC: 34 GM/DL — SIGNIFICANT CHANGE UP (ref 32–36)
MCV RBC AUTO: 96.8 FL — SIGNIFICANT CHANGE UP (ref 80–100)
PHOSPHATE SERPL-MCNC: 2.5 MG/DL — SIGNIFICANT CHANGE UP (ref 2.4–4.7)
PLATELET # BLD AUTO: 218 K/UL — SIGNIFICANT CHANGE UP (ref 150–400)
POTASSIUM SERPL-MCNC: 3.4 MMOL/L — LOW (ref 3.5–5.3)
POTASSIUM SERPL-SCNC: 3.4 MMOL/L — LOW (ref 3.5–5.3)
RBC # BLD: 3.77 M/UL — LOW (ref 3.8–5.2)
RBC # FLD: 12.5 % — SIGNIFICANT CHANGE UP (ref 10.3–14.5)
SODIUM SERPL-SCNC: 136 MMOL/L — SIGNIFICANT CHANGE UP (ref 135–145)
WBC # BLD: 7.65 K/UL — SIGNIFICANT CHANGE UP (ref 3.8–10.5)
WBC # FLD AUTO: 7.65 K/UL — SIGNIFICANT CHANGE UP (ref 3.8–10.5)

## 2023-06-17 PROCEDURE — 99232 SBSQ HOSP IP/OBS MODERATE 35: CPT

## 2023-06-17 RX ORDER — ENOXAPARIN SODIUM 100 MG/ML
30 INJECTION SUBCUTANEOUS EVERY 24 HOURS
Refills: 0 | Status: DISCONTINUED | OUTPATIENT
Start: 2023-06-17 | End: 2023-06-18

## 2023-06-17 RX ORDER — POTASSIUM PHOSPHATE, MONOBASIC POTASSIUM PHOSPHATE, DIBASIC 236; 224 MG/ML; MG/ML
30 INJECTION, SOLUTION INTRAVENOUS ONCE
Refills: 0 | Status: COMPLETED | OUTPATIENT
Start: 2023-06-17 | End: 2023-06-17

## 2023-06-17 RX ADMIN — Medication 2 MILLIGRAM(S): at 17:16

## 2023-06-17 RX ADMIN — PANTOPRAZOLE SODIUM 40 MILLIGRAM(S): 20 TABLET, DELAYED RELEASE ORAL at 17:16

## 2023-06-17 RX ADMIN — DEXTROSE MONOHYDRATE, SODIUM CHLORIDE, AND POTASSIUM CHLORIDE 100 MILLILITER(S): 50; .745; 4.5 INJECTION, SOLUTION INTRAVENOUS at 17:22

## 2023-06-17 RX ADMIN — PIPERACILLIN AND TAZOBACTAM 25 GRAM(S): 4; .5 INJECTION, POWDER, LYOPHILIZED, FOR SOLUTION INTRAVENOUS at 22:26

## 2023-06-17 RX ADMIN — PANTOPRAZOLE SODIUM 40 MILLIGRAM(S): 20 TABLET, DELAYED RELEASE ORAL at 06:10

## 2023-06-17 RX ADMIN — DEXTROSE MONOHYDRATE, SODIUM CHLORIDE, AND POTASSIUM CHLORIDE 100 MILLILITER(S): 50; .745; 4.5 INJECTION, SOLUTION INTRAVENOUS at 06:10

## 2023-06-17 RX ADMIN — LIDOCAINE 1 PATCH: 4 CREAM TOPICAL at 12:31

## 2023-06-17 RX ADMIN — Medication 0.25 MILLIGRAM(S): at 22:42

## 2023-06-17 RX ADMIN — HYDROMORPHONE HYDROCHLORIDE 0.2 MILLIGRAM(S): 2 INJECTION INTRAMUSCULAR; INTRAVENOUS; SUBCUTANEOUS at 17:17

## 2023-06-17 RX ADMIN — PIPERACILLIN AND TAZOBACTAM 25 GRAM(S): 4; .5 INJECTION, POWDER, LYOPHILIZED, FOR SOLUTION INTRAVENOUS at 14:31

## 2023-06-17 RX ADMIN — Medication 2 MILLIGRAM(S): at 06:09

## 2023-06-17 RX ADMIN — ENOXAPARIN SODIUM 30 MILLIGRAM(S): 100 INJECTION SUBCUTANEOUS at 12:31

## 2023-06-17 RX ADMIN — HYDROMORPHONE HYDROCHLORIDE 0.2 MILLIGRAM(S): 2 INJECTION INTRAMUSCULAR; INTRAVENOUS; SUBCUTANEOUS at 05:10

## 2023-06-17 RX ADMIN — POTASSIUM PHOSPHATE, MONOBASIC POTASSIUM PHOSPHATE, DIBASIC 83.33 MILLIMOLE(S): 236; 224 INJECTION, SOLUTION INTRAVENOUS at 22:37

## 2023-06-17 RX ADMIN — LIDOCAINE 1 PATCH: 4 CREAM TOPICAL at 20:52

## 2023-06-17 RX ADMIN — HYDROMORPHONE HYDROCHLORIDE 0.2 MILLIGRAM(S): 2 INJECTION INTRAMUSCULAR; INTRAVENOUS; SUBCUTANEOUS at 17:40

## 2023-06-17 RX ADMIN — Medication 60 MICROGRAM(S): at 06:10

## 2023-06-17 RX ADMIN — LIDOCAINE 1 PATCH: 4 CREAM TOPICAL at 05:09

## 2023-06-17 RX ADMIN — PIPERACILLIN AND TAZOBACTAM 25 GRAM(S): 4; .5 INJECTION, POWDER, LYOPHILIZED, FOR SOLUTION INTRAVENOUS at 06:09

## 2023-06-17 NOTE — PROGRESS NOTE ADULT - NS ATTEND AMEND GEN_ALL_CORE FT
Patient seen and examined with nurse practitioner  Currently no nausea no vomiting.  She has dark bilious material from the NG tube  Patient is agreed for EGD with duodenal stent placement for Monday    Abdominal exam–positive bowel sounds soft nontender, nondistended    Assessment and plan  Patient with metastatic colon cancer.  Likely lymphadenopathy causing extrinsic compression on the duodenum.  For EGD with duodenal stent on Monday

## 2023-06-17 NOTE — PROGRESS NOTE ADULT - SUBJECTIVE AND OBJECTIVE BOX
INTERVAL HPI/OVERNIGHT EVENTS:    Patient evaluated at bedside. No acute distress. No acute events overnight.    MEDICATIONS  (STANDING):  benzocaine 20% Spray 1 Spray(s) Topical once  dexAMETHasone  Injectable 2 milliGRAM(s) IV Push two times a day  dextrose 5% + sodium chloride 0.9% with potassium chloride 20 mEq/L 1000 milliLiter(s) (100 mL/Hr) IV Continuous <Continuous>  levothyroxine Injectable 60 MICROGram(s) IV Push <User Schedule>  lidocaine   4% Patch 1 Patch Transdermal daily  pantoprazole  Injectable 40 milliGRAM(s) IV Push two times a day  phenytoin   Capsule 100 milliGRAM(s) Oral daily  phenytoin   Chewable 50 milliGRAM(s) Chew at bedtime  piperacillin/tazobactam IVPB.. 3.375 Gram(s) IV Intermittent every 8 hours  potassium phosphate IVPB 30 milliMole(s) IV Intermittent once    MEDICATIONS  (PRN):  HYDROmorphone  Injectable 0.2 milliGRAM(s) IV Push every 4 hours PRN Moderate Pain (4 - 6)  HYDROmorphone  Injectable 0.5 milliGRAM(s) IV Push every 6 hours PRN Severe Pain (7 - 10)  LORazepam   Injectable 0.25 milliGRAM(s) IV Push two times a day PRN Anxiety  ondansetron Injectable 4 milliGRAM(s) IV Push every 6 hours PRN Nausea      Vital Signs Last 24 Hrs  T(C): 36.7 (17 Jun 2023 08:06), Max: 37 (16 Jun 2023 16:00)  T(F): 98.1 (17 Jun 2023 08:06), Max: 98.6 (16 Jun 2023 16:00)  HR: 77 (17 Jun 2023 08:06) (70 - 81)  BP: 154/73 (17 Jun 2023 08:06) (152/81 - 188/76)  BP(mean): 102 (17 Jun 2023 06:00) (102 - 106)  RR: 20 (17 Jun 2023 08:06) (18 - 20)  SpO2: 92% (17 Jun 2023 08:06) (92% - 96%)    Parameters below as of 17 Jun 2023 08:06  Patient On (Oxygen Delivery Method): room air        Constitutional: NAD  Respiratory: Breath Sounds equal & clear to percussion & auscultation, no accessory muscle use  Cardiovascular: Regular rate & rhythm, normal S1, S2; no murmurs, gallops or rubs; no S3, S4  Gastrointestinal: Soft, non-tender, no hepatosplenomegaly, normal bowel sounds      I&O's Detail    16 Jun 2023 07:01  -  17 Jun 2023 07:00  --------------------------------------------------------  IN:    dextrose 5% + sodium chloride 0.9% w/ Additives: 1200 mL    IV PiggyBack: 200 mL  Total IN: 1400 mL    OUT:    Nasogastric/Oral tube (mL): 50 mL    Voided (mL): 400 mL  Total OUT: 450 mL    Total NET: 950 mL          LABS:                        12.4   7.65  )-----------( 218      ( 17 Jun 2023 04:04 )             36.5     06-17    136  |  99  |  6.1<L>  ----------------------------<  129<H>  3.4<L>   |  26.0  |  0.40<L>    Ca    8.4      17 Jun 2023 04:04  Phos  2.5     06-17  Mg     2.1     06-17      PT/INR - ( 16 Jun 2023 08:20 )   PT: 13.0 sec;   INR: 1.12 ratio         PTT - ( 16 Jun 2023 08:20 )  PTT:24.0 sec      RADIOLOGY & ADDITIONAL STUDIES: INTERVAL HPI/OVERNIGHT EVENTS:    Patient evaluated at bedside. No acute distress. No acute events overnight.    MEDICATIONS  (STANDING):  benzocaine 20% Spray 1 Spray(s) Topical once  dexAMETHasone  Injectable 2 milliGRAM(s) IV Push two times a day  dextrose 5% + sodium chloride 0.9% with potassium chloride 20 mEq/L 1000 milliLiter(s) (100 mL/Hr) IV Continuous <Continuous>  levothyroxine Injectable 60 MICROGram(s) IV Push <User Schedule>  lidocaine   4% Patch 1 Patch Transdermal daily  pantoprazole  Injectable 40 milliGRAM(s) IV Push two times a day  phenytoin   Capsule 100 milliGRAM(s) Oral daily  phenytoin   Chewable 50 milliGRAM(s) Chew at bedtime  piperacillin/tazobactam IVPB.. 3.375 Gram(s) IV Intermittent every 8 hours  potassium phosphate IVPB 30 milliMole(s) IV Intermittent once    MEDICATIONS  (PRN):  HYDROmorphone  Injectable 0.2 milliGRAM(s) IV Push every 4 hours PRN Moderate Pain (4 - 6)  HYDROmorphone  Injectable 0.5 milliGRAM(s) IV Push every 6 hours PRN Severe Pain (7 - 10)  LORazepam   Injectable 0.25 milliGRAM(s) IV Push two times a day PRN Anxiety  ondansetron Injectable 4 milliGRAM(s) IV Push every 6 hours PRN Nausea      Vital Signs Last 24 Hrs  T(C): 36.7 (17 Jun 2023 08:06), Max: 37 (16 Jun 2023 16:00)  T(F): 98.1 (17 Jun 2023 08:06), Max: 98.6 (16 Jun 2023 16:00)  HR: 77 (17 Jun 2023 08:06) (70 - 81)  BP: 154/73 (17 Jun 2023 08:06) (152/81 - 188/76)  BP(mean): 102 (17 Jun 2023 06:00) (102 - 106)  RR: 20 (17 Jun 2023 08:06) (18 - 20)  SpO2: 92% (17 Jun 2023 08:06) (92% - 96%)    Parameters below as of 17 Jun 2023 08:06  Patient On (Oxygen Delivery Method): room air        Constitutional: NAD  Respiratory: Breath Sounds equal & clear to percussion & auscultation, no accessory muscle use  Cardiovascular: Regular rate & rhythm, normal S1, S2; no murmurs, gallops or rubs; no S3, S4  Gastrointestinal: Soft, non-tender, no hepatosplenomegaly, normal bowel sounds      I&O's Detail    16 Jun 2023 07:01  -  17 Jun 2023 07:00  --------------------------------------------------------  IN:    dextrose 5% + sodium chloride 0.9% w/ Additives: 1200 mL    IV PiggyBack: 200 mL  Total IN: 1400 mL    OUT:    Nasogastric/Oral tube (mL): 50 mL    Voided (mL): 400 mL  Total OUT: 450 mL    Total NET: 950 mL    LABS:                        12.4   7.65  )-----------( 218      ( 17 Jun 2023 04:04 )             36.5     06-17    136  |  99  |  6.1<L>  ----------------------------<  129<H>  3.4<L>   |  26.0  |  0.40<L>    Ca    8.4      17 Jun 2023 04:04  Phos  2.5     06-17  Mg     2.1     06-17      PT/INR - ( 16 Jun 2023 08:20 )   PT: 13.0 sec;   INR: 1.12 ratio         PTT - ( 16 Jun 2023 08:20 )  PTT:24.0 sec      RADIOLOGY & ADDITIONAL STUDIES:
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
## 2023-06-17 NOTE — DIETITIAN INITIAL EVALUATION ADULT - PERTINENT LABORATORY DATA
06-17    136  |  99  |  6.1<L>  ----------------------------<  129<H>  3.4<L>   |  26.0  |  0.40<L>    Ca    8.4      17 Jun 2023 04:04  Phos  2.5     06-17  Mg     2.1     06-17    A1C with Estimated Average Glucose Result: 5.4 % (04-06-23 @ 15:27)

## 2023-06-17 NOTE — PROGRESS NOTE ADULT - ASSESSMENT
82 yo F s/p recent R extended hemicolectomy, now with trouble swallowing. CT with largely distended stomach and Duodenal thickening. EGD per GI demonstrating obstructing duodenal lesion seen at the duodenal sweep which did not allow the scope to pass beyond, causing GOO. NGT in place, plan for repeat scope today and possible stent placement.    Plan:  Pain control  NPO except meds/IVF  NGT, monitor output   VTE ppx   f/u EGD for possible stent 6/20/23  Monitor AM labs 84 yo F s/p recent R extended hemicolectomy, now with trouble swallowing. CT with largely distended stomach and Duodenal thickening. EGD per GI demonstrating obstructing duodenal lesion seen at the duodenal sweep which did not allow the scope to pass beyond, causing GOO. NGT in place, plan for repeat scope 6/20 and possible stent placement.    Plan:  Pain control  NPO except meds/IVF  NGT, monitor output   VTE ppx   f/u EGD for possible stent 6/20/23  Monitor AM labs

## 2023-06-17 NOTE — PROGRESS NOTE ADULT - ASSESSMENT
82 yo F with Hx of Colon cancer, s/p R extended hemicolectomy with SBR and primary anastomosis 1 month ago presented to ED with dysphagia x 1 week. CT chest done showed wall thickening of the third portion of the duodenum suspect for mass and causing gastric outlet obstruction. Widespread axillary, mediastinal, hilar, retroperitoneal and pelvic lymphadenopathy compatible with metastatic disease, Multiple irregular shaped opacities in both lungs; favor pneumonia. Metastatic disease is not excluded.

## 2023-06-17 NOTE — DIETITIAN INITIAL EVALUATION ADULT - CALCULATED TO (G/KG)
Pt treated today for oral candidiasis.  Rx for Magic Mouthwash.  Was told by pharmacist that this is just for pain.  Pt would like to know why antifungal medication was not prescribed.    Recommended pt use magic mouthwash tonight, d/w office tomorrow whether treatment with antifungal indicated.  He is agreeable with this plan.    Reason for Disposition  • Caller has nonurgent medication question about med that PCP prescribed and triager unable to answer question    Protocols used: MEDICATION QUESTION CALL-A-AH     73.28

## 2023-06-17 NOTE — PROGRESS NOTE ADULT - ASSESSMENT
82 yo F with Hx of Colon cancer, s/p R extended hemicolectomy with SBR and primary anastomosis 1 month ago, came in ED  as she was unable to swallow for 1 week, she felt her food is being trapped in her esophagus at the level of the chest with occasional nausea and vomiting, she has been able to pas gas and BM, patient had CT chest done showed Wall thickening of the third portion of the duodenum suspect for mass and causing gastric outlet obstruction.  Widespread axillary, mediastinal, hilar, retroperitoneal and pelvic lymphadenopathy compatible with metastatic disease, Multiple irregular shaped opacities in both lungs; favor pneumonia. Metastatic disease is not excluded.  patient walks with walker, denies any exertional dysnea or chest pain, Patient's METS is limited, her RCRI is 1, patient labs, EKG and Echo reviewed, patient is at intermediate risk for perioperative cardiovascular complication and is optimized from the medicine point of view for planned procedure.       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:    CT chest showed Wall thickening of the third portion of the duodenum suspect for mass and causing gastric outlet obstruction  s/p EGD and had biopsies done  GI is following likely going to have duodenal stenting    NG in place on low intermittent suction   NPO  IV fluids   Protonix IV       UA suggestive of UTI: Would recommend Zosyn give Lung findings on CT scan, urine cultures in progress, blood cultures negative, could not rule out Pneumonia given possible finding related to Mets, will finalized antibiotics once urine cultures is back    Colon cancer with Metastatic lesions : heme/Onc is following     Hypothyroidism: On Synthroid 100 mcg once a day, if can not tolerate oral would change to IV half a dose     Hx of Seizure: On Phenytoin 100 mg once a day in am and phenytoin 50 mg once a day (at bedtime), if can not tolerate oral the will give IV, will get level, will do seizure precaution.     palliative care is following, patient do not want any aggressive measure, family meeting with palliative care team later on today.   will follow along              84 yo F with Hx of Colon cancer, s/p R extended hemicolectomy with SBR and primary anastomosis 1 month ago, came in ED  as she was unable to swallow for 1 week, she felt her food is being trapped in her esophagus at the level of the chest with occasional nausea and vomiting, she has been able to pas gas and BM, patient had CT chest done showed Wall thickening of the third portion of the duodenum suspect for mass and causing gastric outlet obstruction.  Widespread axillary, mediastinal, hilar, retroperitoneal and pelvic lymphadenopathy compatible with metastatic disease, Multiple irregular shaped opacities in both lungs; favor pneumonia. Metastatic disease is not excluded.  patient walks with walker, denies any exertional dysnea or chest pain, Patient's METS is limited, her RCRI is 1, patient labs, EKG and Echo reviewed, patient is at intermediate risk for perioperative cardiovascular complication and is optimized from the medicine point of view for planned procedure.       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:    CT chest showed Wall thickening of the third portion of the duodenum suspect for mass and causing gastric outlet obstruction  s/p EGD and had biopsies done  GI is following likely going to have duodenal stenting    NG in place on low intermittent suction   NPO  IV fluids   Protonix IV       UA suggestive of UTI: Would recommend Zosyn give Lung findings on CT scan, urine cultures in progress, blood cultures negative, could not rule out Pneumonia given possible finding related to Mets, will finalized antibiotics once urine cultures is back    Colon cancer with Metastatic lesions : heme/Onc is following     Hypothyroidism: On Synthroid 100 mcg once a day, if can not tolerate oral would change to IV half a dose     Hx of Seizure: On Phenytoin 100 mg once a day in am and phenytoin 50 mg once a day (at bedtime), if can not tolerate oral the will give IV, will get level, will do seizure precaution.     palliative care is following, patient do not want any aggressive measure,  will follow along

## 2023-06-17 NOTE — DIETITIAN INITIAL EVALUATION ADULT - NSFNSGIIOFT_GEN_A_CORE
06-16-23 @ 07:01  -  06-17-23 @ 07:00  --------------------------------------------------------  OUT:    Nasogastric/Oral tube (mL): 50 mL  Total OUT: 50 mL    Total NET: -50 mL
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
## 2023-06-17 NOTE — DIETITIAN INITIAL EVALUATION ADULT - ADD RECOMMEND
Consider parenteral nutrition if within pt wishes  Daily weights and trends  Monitor electrolytes, correct as needed Consider parenteral nutrition if within pt wishes  Daily weights and trends  Monitor electrolytes, correct as needed  SLP consult if/when appropriate

## 2023-06-17 NOTE — PROGRESS NOTE ADULT - SUBJECTIVE AND OBJECTIVE BOX
MARTINA HEIKE    [id number]    83y      Female    Patient is a 83y old  Female who presents with a chief complaint of GOO (17 Jun 2023 09:01)      INTERVAL HPI/OVERNIGHT EVENTS:    patient is feeling tired and unwell, has some abdominal pain       Vital Signs Last 24 Hrs  T(C): 36.7 (17 Jun 2023 08:06), Max: 37 (16 Jun 2023 16:00)  T(F): 98.1 (17 Jun 2023 08:06), Max: 98.6 (16 Jun 2023 16:00)  HR: 77 (17 Jun 2023 08:06) (70 - 81)  BP: 154/73 (17 Jun 2023 08:06) (152/81 - 188/76)  BP(mean): 102 (17 Jun 2023 06:00) (102 - 106)  RR: 20 (17 Jun 2023 08:06) (18 - 20)  SpO2: 92% (17 Jun 2023 08:06) (92% - 96%)    Parameters below as of 17 Jun 2023 08:06  Patient On (Oxygen Delivery Method): room air        PHYSICAL EXAM:    GENERAL: Elderly cachetic female looking uncomfortable    HEENT: has NG placed   NECK: soft, Supple, No JVD  CHEST/LUNG: Clear to auscultate bilaterally; No wheezing  HEART: S1S2+, Regular rate and rhythm; No murmurs  ABDOMEN: Soft, Nontender, Nondistended; Bowel sounds present  EXTREMITIES:  1+ Peripheral Pulses, No edema  SKIN: No rashes or lesions  NEURO: AAOX3  PSYCH: normal mood    LABS:                        12.4   7.65  )-----------( 218      ( 17 Jun 2023 04:04 )             36.5     06-17    136  |  99  |  6.1<L>  ----------------------------<  129<H>  3.4<L>   |  26.0  |  0.40<L>    Ca    8.4      17 Jun 2023 04:04  Phos  2.5     06-17  Mg     2.1     06-17      PT/INR - ( 16 Jun 2023 08:20 )   PT: 13.0 sec;   INR: 1.12 ratio         PTT - ( 16 Jun 2023 08:20 )  PTT:24.0 sec        I&O's Summary    16 Jun 2023 07:01  -  17 Jun 2023 07:00  --------------------------------------------------------  IN: 1400 mL / OUT: 450 mL / NET: 950 mL        MEDICATIONS  (STANDING):  benzocaine 20% Spray 1 Spray(s) Topical once  dexAMETHasone  Injectable 2 milliGRAM(s) IV Push two times a day  dextrose 5% + sodium chloride 0.9% with potassium chloride 20 mEq/L 1000 milliLiter(s) (100 mL/Hr) IV Continuous <Continuous>  enoxaparin Injectable 30 milliGRAM(s) SubCutaneous every 24 hours  levothyroxine Injectable 60 MICROGram(s) IV Push <User Schedule>  lidocaine   4% Patch 1 Patch Transdermal daily  pantoprazole  Injectable 40 milliGRAM(s) IV Push two times a day  phenytoin   Capsule 100 milliGRAM(s) Oral daily  phenytoin   Chewable 50 milliGRAM(s) Chew at bedtime  piperacillin/tazobactam IVPB.. 3.375 Gram(s) IV Intermittent every 8 hours  potassium phosphate IVPB 30 milliMole(s) IV Intermittent once    MEDICATIONS  (PRN):  HYDROmorphone  Injectable 0.2 milliGRAM(s) IV Push every 4 hours PRN Moderate Pain (4 - 6)  HYDROmorphone  Injectable 0.5 milliGRAM(s) IV Push every 6 hours PRN Severe Pain (7 - 10)  LORazepam   Injectable 0.25 milliGRAM(s) IV Push two times a day PRN Anxiety  ondansetron Injectable 4 milliGRAM(s) IV Push every 6 hours PRN Nausea

## 2023-06-17 NOTE — DIETITIAN INITIAL EVALUATION ADULT - ORAL INTAKE PTA/DIET HISTORY
Pt resting at time of visit. Chart reviewed, reported 25 lb unintentional weight loss over the past few weeks. She was seen by [facility] RD on 4/13/23 and was identified with severe malnutrition, continues to meet criteria. Pt's weight in april was 124 lbs, bed scale weight this admission 101 lbs ; 23 lbs (18.5%) weight loss x 2 months. Remains NPO at this time secondary to intestinal obstruction, NGT to suction with dark bilious output, receiving IVF. Per notes, pt does not want venting PEG or J tube for feedings. RD to follow.

## 2023-06-17 NOTE — DIETITIAN INITIAL EVALUATION ADULT - PERTINENT MEDS FT
MEDICATIONS  (STANDING):  benzocaine 20% Spray 1 Spray(s) Topical once  dexAMETHasone  Injectable 2 milliGRAM(s) IV Push two times a day  dextrose 5% + sodium chloride 0.9% with potassium chloride 20 mEq/L 1000 milliLiter(s) (100 mL/Hr) IV Continuous <Continuous>  enoxaparin Injectable 30 milliGRAM(s) SubCutaneous every 24 hours  levothyroxine Injectable 60 MICROGram(s) IV Push <User Schedule>  lidocaine   4% Patch 1 Patch Transdermal daily  pantoprazole  Injectable 40 milliGRAM(s) IV Push two times a day  phenytoin   Capsule 100 milliGRAM(s) Oral daily  phenytoin   Chewable 50 milliGRAM(s) Chew at bedtime  piperacillin/tazobactam IVPB.. 3.375 Gram(s) IV Intermittent every 8 hours  potassium phosphate IVPB 30 milliMole(s) IV Intermittent once    MEDICATIONS  (PRN):  HYDROmorphone  Injectable 0.2 milliGRAM(s) IV Push every 4 hours PRN Moderate Pain (4 - 6)  HYDROmorphone  Injectable 0.5 milliGRAM(s) IV Push every 6 hours PRN Severe Pain (7 - 10)  LORazepam   Injectable 0.25 milliGRAM(s) IV Push two times a day PRN Anxiety  ondansetron Injectable 4 milliGRAM(s) IV Push every 6 hours PRN Nausea

## 2023-06-17 NOTE — DIETITIAN NUTRITION RISK NOTIFICATION - ADDITIONAL COMMENTS/DIETITIAN RECOMMENDATIONS
Consider parenteral nutrition if within pt wishes  Daily weights and trends  Monitor electrolytes, correct as needed

## 2023-06-18 LAB
ANION GAP SERPL CALC-SCNC: 12 MMOL/L — SIGNIFICANT CHANGE UP (ref 5–17)
BASOPHILS # BLD AUTO: 0.02 K/UL — SIGNIFICANT CHANGE UP (ref 0–0.2)
BASOPHILS NFR BLD AUTO: 0.3 % — SIGNIFICANT CHANGE UP (ref 0–2)
BLD GP AB SCN SERPL QL: SIGNIFICANT CHANGE UP
BUN SERPL-MCNC: 4 MG/DL — LOW (ref 8–20)
CALCIUM SERPL-MCNC: 8.1 MG/DL — LOW (ref 8.4–10.5)
CHLORIDE SERPL-SCNC: 101 MMOL/L — SIGNIFICANT CHANGE UP (ref 96–108)
CO2 SERPL-SCNC: 21 MMOL/L — LOW (ref 22–29)
CREAT SERPL-MCNC: 0.34 MG/DL — LOW (ref 0.5–1.3)
DIR ANTIGLOB POLYSPECIFIC INTERPRETATION: SIGNIFICANT CHANGE UP
EGFR: 101 ML/MIN/1.73M2 — SIGNIFICANT CHANGE UP
EOSINOPHIL # BLD AUTO: 0.28 K/UL — SIGNIFICANT CHANGE UP (ref 0–0.5)
EOSINOPHIL NFR BLD AUTO: 4.1 % — SIGNIFICANT CHANGE UP (ref 0–6)
GLUCOSE SERPL-MCNC: 147 MG/DL — HIGH (ref 70–99)
HCT VFR BLD CALC: 34.1 % — LOW (ref 34.5–45)
HGB BLD-MCNC: 11.9 G/DL — SIGNIFICANT CHANGE UP (ref 11.5–15.5)
IMM GRANULOCYTES NFR BLD AUTO: 0.3 % — SIGNIFICANT CHANGE UP (ref 0–0.9)
INR BLD: 1.29 RATIO — HIGH (ref 0.88–1.16)
LYMPHOCYTES # BLD AUTO: 1.08 K/UL — SIGNIFICANT CHANGE UP (ref 1–3.3)
LYMPHOCYTES # BLD AUTO: 15.8 % — SIGNIFICANT CHANGE UP (ref 13–44)
MAGNESIUM SERPL-MCNC: 1.8 MG/DL — SIGNIFICANT CHANGE UP (ref 1.6–2.6)
MCHC RBC-ENTMCNC: 33.5 PG — SIGNIFICANT CHANGE UP (ref 27–34)
MCHC RBC-ENTMCNC: 34.9 GM/DL — SIGNIFICANT CHANGE UP (ref 32–36)
MCV RBC AUTO: 96.1 FL — SIGNIFICANT CHANGE UP (ref 80–100)
MONOCYTES # BLD AUTO: 0.57 K/UL — SIGNIFICANT CHANGE UP (ref 0–0.9)
MONOCYTES NFR BLD AUTO: 8.3 % — SIGNIFICANT CHANGE UP (ref 2–14)
NEUTROPHILS # BLD AUTO: 4.88 K/UL — SIGNIFICANT CHANGE UP (ref 1.8–7.4)
NEUTROPHILS NFR BLD AUTO: 71.2 % — SIGNIFICANT CHANGE UP (ref 43–77)
PHOSPHATE SERPL-MCNC: 3.2 MG/DL — SIGNIFICANT CHANGE UP (ref 2.4–4.7)
PLATELET # BLD AUTO: 229 K/UL — SIGNIFICANT CHANGE UP (ref 150–400)
POTASSIUM SERPL-MCNC: 3.9 MMOL/L — SIGNIFICANT CHANGE UP (ref 3.5–5.3)
POTASSIUM SERPL-SCNC: 3.9 MMOL/L — SIGNIFICANT CHANGE UP (ref 3.5–5.3)
PROTHROM AB SERPL-ACNC: 15 SEC — HIGH (ref 10.5–13.4)
RBC # BLD: 3.55 M/UL — LOW (ref 3.8–5.2)
RBC # FLD: 12.6 % — SIGNIFICANT CHANGE UP (ref 10.3–14.5)
SODIUM SERPL-SCNC: 134 MMOL/L — LOW (ref 135–145)
WBC # BLD: 6.85 K/UL — SIGNIFICANT CHANGE UP (ref 3.8–10.5)
WBC # FLD AUTO: 6.85 K/UL — SIGNIFICANT CHANGE UP (ref 3.8–10.5)

## 2023-06-18 PROCEDURE — 99232 SBSQ HOSP IP/OBS MODERATE 35: CPT

## 2023-06-18 RX ORDER — HYDRALAZINE HCL 50 MG
5 TABLET ORAL EVERY 6 HOURS
Refills: 0 | Status: DISCONTINUED | OUTPATIENT
Start: 2023-06-18 | End: 2023-06-19

## 2023-06-18 RX ORDER — HYDROMORPHONE HYDROCHLORIDE 2 MG/ML
0.5 INJECTION INTRAMUSCULAR; INTRAVENOUS; SUBCUTANEOUS EVERY 4 HOURS
Refills: 0 | Status: DISCONTINUED | OUTPATIENT
Start: 2023-06-18 | End: 2023-06-19

## 2023-06-18 RX ORDER — FOSPHENYTOIN 50 MG/ML
100 INJECTION INTRAMUSCULAR; INTRAVENOUS
Refills: 0 | Status: DISCONTINUED | OUTPATIENT
Start: 2023-06-18 | End: 2023-06-19

## 2023-06-18 RX ORDER — POTASSIUM CHLORIDE 20 MEQ
10 PACKET (EA) ORAL ONCE
Refills: 0 | Status: COMPLETED | OUTPATIENT
Start: 2023-06-18 | End: 2023-06-18

## 2023-06-18 RX ORDER — FOSPHENYTOIN 50 MG/ML
50 INJECTION INTRAMUSCULAR; INTRAVENOUS
Refills: 0 | Status: DISCONTINUED | OUTPATIENT
Start: 2023-06-18 | End: 2023-06-19

## 2023-06-18 RX ORDER — MAGNESIUM SULFATE 500 MG/ML
1 VIAL (ML) INJECTION ONCE
Refills: 0 | Status: COMPLETED | OUTPATIENT
Start: 2023-06-18 | End: 2023-06-18

## 2023-06-18 RX ADMIN — HYDROMORPHONE HYDROCHLORIDE 0.5 MILLIGRAM(S): 2 INJECTION INTRAMUSCULAR; INTRAVENOUS; SUBCUTANEOUS at 23:40

## 2023-06-18 RX ADMIN — LIDOCAINE 1 PATCH: 4 CREAM TOPICAL at 03:53

## 2023-06-18 RX ADMIN — HYDROMORPHONE HYDROCHLORIDE 0.2 MILLIGRAM(S): 2 INJECTION INTRAMUSCULAR; INTRAVENOUS; SUBCUTANEOUS at 13:57

## 2023-06-18 RX ADMIN — Medication 5 MILLIGRAM(S): at 01:38

## 2023-06-18 RX ADMIN — Medication 100 GRAM(S): at 10:29

## 2023-06-18 RX ADMIN — PANTOPRAZOLE SODIUM 40 MILLIGRAM(S): 20 TABLET, DELAYED RELEASE ORAL at 05:52

## 2023-06-18 RX ADMIN — Medication 2 MILLIGRAM(S): at 05:52

## 2023-06-18 RX ADMIN — PIPERACILLIN AND TAZOBACTAM 25 GRAM(S): 4; .5 INJECTION, POWDER, LYOPHILIZED, FOR SOLUTION INTRAVENOUS at 22:10

## 2023-06-18 RX ADMIN — PANTOPRAZOLE SODIUM 40 MILLIGRAM(S): 20 TABLET, DELAYED RELEASE ORAL at 18:45

## 2023-06-18 RX ADMIN — Medication 5 MILLIGRAM(S): at 22:48

## 2023-06-18 RX ADMIN — PIPERACILLIN AND TAZOBACTAM 25 GRAM(S): 4; .5 INJECTION, POWDER, LYOPHILIZED, FOR SOLUTION INTRAVENOUS at 05:51

## 2023-06-18 RX ADMIN — DEXTROSE MONOHYDRATE, SODIUM CHLORIDE, AND POTASSIUM CHLORIDE 100 MILLILITER(S): 50; .745; 4.5 INJECTION, SOLUTION INTRAVENOUS at 22:23

## 2023-06-18 RX ADMIN — DEXTROSE MONOHYDRATE, SODIUM CHLORIDE, AND POTASSIUM CHLORIDE 100 MILLILITER(S): 50; .745; 4.5 INJECTION, SOLUTION INTRAVENOUS at 05:52

## 2023-06-18 RX ADMIN — FOSPHENYTOIN 102 MILLIGRAM(S) PE: 50 INJECTION INTRAMUSCULAR; INTRAVENOUS at 22:12

## 2023-06-18 RX ADMIN — HYDROMORPHONE HYDROCHLORIDE 0.2 MILLIGRAM(S): 2 INJECTION INTRAMUSCULAR; INTRAVENOUS; SUBCUTANEOUS at 13:42

## 2023-06-18 RX ADMIN — PIPERACILLIN AND TAZOBACTAM 25 GRAM(S): 4; .5 INJECTION, POWDER, LYOPHILIZED, FOR SOLUTION INTRAVENOUS at 13:39

## 2023-06-18 RX ADMIN — LIDOCAINE 1 PATCH: 4 CREAM TOPICAL at 22:11

## 2023-06-18 RX ADMIN — Medication 60 MICROGRAM(S): at 05:53

## 2023-06-18 RX ADMIN — FOSPHENYTOIN 104 MILLIGRAM(S) PE: 50 INJECTION INTRAMUSCULAR; INTRAVENOUS at 18:45

## 2023-06-18 RX ADMIN — Medication 2 MILLIGRAM(S): at 18:45

## 2023-06-18 RX ADMIN — Medication 100 MILLIEQUIVALENT(S): at 10:29

## 2023-06-18 RX ADMIN — LIDOCAINE 1 PATCH: 4 CREAM TOPICAL at 13:38

## 2023-06-18 NOTE — PROGRESS NOTE ADULT - SUBJECTIVE AND OBJECTIVE BOX
HPI/OVERNIGHT EVENTS: Patient seen and examined at bedside this AM. No overnight events. No complaints. Denies nausea or vomiting. When discussed for the EGD and stent placement and the possibility for placement of NGT in case of nausea/vomiting patient refused. Patient refuses any procedures at this moment and would like to be on comfort measures.  Vital Signs Last 24 Hrs  T(C): 36.5 (18 Jun 2023 07:25), Max: 37.3 (17 Jun 2023 17:09)  T(F): 97.7 (18 Jun 2023 07:25), Max: 99.1 (17 Jun 2023 17:09)  HR: 73 (18 Jun 2023 07:25) (69 - 85)  BP: 153/71 (18 Jun 2023 07:25) (153/71 - 176/74)  BP(mean): 100 (17 Jun 2023 20:00) (100 - 100)  RR: 18 (18 Jun 2023 07:25) (18 - 20)  SpO2: 94% (18 Jun 2023 07:25) (94% - 96%)    Parameters below as of 18 Jun 2023 07:25  Patient On (Oxygen Delivery Method): room air        I&O's Detail    17 Jun 2023 07:01  -  18 Jun 2023 07:00  --------------------------------------------------------  IN:    dextrose 5% + sodium chloride 0.9% w/ Additives: 1300 mL    IV PiggyBack: 500 mL    IV PiggyBack: 200 mL  Total IN: 2000 mL    OUT:    Voided (mL): 1000 mL  Total OUT: 1000 mL    Total NET: 1000 mL          Constitutional: patient resting comfortably in bed, in no acute distress  HEENT: EOMI, PERRLA, MMM.  Respiratory: Non labored breathing on RA  Cardiovascular: RRR  Gastrointestinal: Abdomen soft, non-tender, non-distended, no rebound tenderness / guarding  Musculoskeletal: No joint pain, swelling or deformity; no limitation of movement  Vascular: Extremities warm and well perfused.     LABS:                        11.9   6.85  )-----------( 229      ( 18 Jun 2023 06:55 )             34.1     06-18    134<L>  |  101  |  4.0<L>  ----------------------------<  147<H>  3.9   |  21.0<L>  |  0.34<L>    Ca    8.1<L>      18 Jun 2023 06:55  Phos  3.2     06-18  Mg     1.8     06-18      PT/INR - ( 18 Jun 2023 06:55 )   PT: 15.0 sec;   INR: 1.29 ratio               MEDICATIONS  (STANDING):  benzocaine 20% Spray 1 Spray(s) Topical once  dexAMETHasone  Injectable 2 milliGRAM(s) IV Push two times a day  dextrose 5% + sodium chloride 0.9% with potassium chloride 20 mEq/L 1000 milliLiter(s) (100 mL/Hr) IV Continuous <Continuous>  fosphenytoin IVPB 100 milliGRAM(s) PE IV Intermittent <User Schedule>  fosphenytoin IVPB 50 milliGRAM(s) PE IV Intermittent <User Schedule>  levothyroxine Injectable 60 MICROGram(s) IV Push <User Schedule>  lidocaine   4% Patch 1 Patch Transdermal daily  magnesium sulfate  IVPB 1 Gram(s) IV Intermittent once  pantoprazole  Injectable 40 milliGRAM(s) IV Push two times a day  phenytoin   Capsule 100 milliGRAM(s) Oral daily  phenytoin   Chewable 50 milliGRAM(s) Chew at bedtime  piperacillin/tazobactam IVPB.. 3.375 Gram(s) IV Intermittent every 8 hours  potassium chloride  10 mEq/100 mL IVPB 10 milliEquivalent(s) IV Intermittent once    MEDICATIONS  (PRN):  hydrALAZINE Injectable 5 milliGRAM(s) IV Push every 6 hours PRN hypertension  HYDROmorphone  Injectable 0.2 milliGRAM(s) IV Push every 4 hours PRN Moderate Pain (4 - 6)  HYDROmorphone  Injectable 0.5 milliGRAM(s) IV Push every 6 hours PRN Severe Pain (7 - 10)  LORazepam   Injectable 0.25 milliGRAM(s) IV Push two times a day PRN Anxiety  ondansetron Injectable 4 milliGRAM(s) IV Push every 6 hours PRN Nausea      MICRO:   Cultures     STUDIES:   EKG, CXR, U/S, CT, MRI    HPI/OVERNIGHT EVENTS: Patient seen and examined at bedside this AM. No overnight events. No complaints. Denies nausea or vomiting. When discussed for the EGD and stent placement and the possibility for placement of NGT in case of nausea/vomiting patient refused. Patient refuses any procedures at this moment and would like to be on comfort measures.  Vital Signs Last 24 Hrs  T(C): 36.5 (18 Jun 2023 07:25), Max: 37.3 (17 Jun 2023 17:09)  T(F): 97.7 (18 Jun 2023 07:25), Max: 99.1 (17 Jun 2023 17:09)  HR: 73 (18 Jun 2023 07:25) (69 - 85)  BP: 153/71 (18 Jun 2023 07:25) (153/71 - 176/74)  BP(mean): 100 (17 Jun 2023 20:00) (100 - 100)  RR: 18 (18 Jun 2023 07:25) (18 - 20)  SpO2: 94% (18 Jun 2023 07:25) (94% - 96%)    Parameters below as of 18 Jun 2023 07:25  Patient On (Oxygen Delivery Method): room air    I&O's Detail    17 Jun 2023 07:01  -  18 Jun 2023 07:00  --------------------------------------------------------  IN:    dextrose 5% + sodium chloride 0.9% w/ Additives: 1300 mL    IV PiggyBack: 500 mL    IV PiggyBack: 200 mL  Total IN: 2000 mL    OUT:    Voided (mL): 1000 mL  Total OUT: 1000 mL    Total NET: 1000 mL    Constitutional: patient resting comfortably in bed, in no acute distress  HEENT: EOMI, PERRLA, MMM.  Respiratory: Non labored breathing on RA  Cardiovascular: RRR  Gastrointestinal: Abdomen soft, non-tender, non-distended, no rebound tenderness / guarding  Musculoskeletal: No joint pain, swelling or deformity; no limitation of movement  Vascular: Extremities warm and well perfused.     LABS:                        11.9   6.85  )-----------( 229      ( 18 Jun 2023 06:55 )             34.1     06-18    134<L>  |  101  |  4.0<L>  ----------------------------<  147<H>  3.9   |  21.0<L>  |  0.34<L>    Ca    8.1<L>      18 Jun 2023 06:55  Phos  3.2     06-18  Mg     1.8     06-18      PT/INR - ( 18 Jun 2023 06:55 )   PT: 15.0 sec;   INR: 1.29 ratio               MEDICATIONS  (STANDING):  benzocaine 20% Spray 1 Spray(s) Topical once  dexAMETHasone  Injectable 2 milliGRAM(s) IV Push two times a day  dextrose 5% + sodium chloride 0.9% with potassium chloride 20 mEq/L 1000 milliLiter(s) (100 mL/Hr) IV Continuous <Continuous>  fosphenytoin IVPB 100 milliGRAM(s) PE IV Intermittent <User Schedule>  fosphenytoin IVPB 50 milliGRAM(s) PE IV Intermittent <User Schedule>  levothyroxine Injectable 60 MICROGram(s) IV Push <User Schedule>  lidocaine   4% Patch 1 Patch Transdermal daily  magnesium sulfate  IVPB 1 Gram(s) IV Intermittent once  pantoprazole  Injectable 40 milliGRAM(s) IV Push two times a day  phenytoin   Capsule 100 milliGRAM(s) Oral daily  phenytoin   Chewable 50 milliGRAM(s) Chew at bedtime  piperacillin/tazobactam IVPB.. 3.375 Gram(s) IV Intermittent every 8 hours  potassium chloride  10 mEq/100 mL IVPB 10 milliEquivalent(s) IV Intermittent once    MEDICATIONS  (PRN):  hydrALAZINE Injectable 5 milliGRAM(s) IV Push every 6 hours PRN hypertension  HYDROmorphone  Injectable 0.2 milliGRAM(s) IV Push every 4 hours PRN Moderate Pain (4 - 6)  HYDROmorphone  Injectable 0.5 milliGRAM(s) IV Push every 6 hours PRN Severe Pain (7 - 10)  LORazepam   Injectable 0.25 milliGRAM(s) IV Push two times a day PRN Anxiety  ondansetron Injectable 4 milliGRAM(s) IV Push every 6 hours PRN Nausea      MICRO:   Cultures     STUDIES:   EKG, CXR, U/S, CT, MRI

## 2023-06-18 NOTE — PROGRESS NOTE ADULT - ASSESSMENT
82 yo F with Hx of Colon cancer, s/p R extended hemicolectomy with SBR and primary anastomosis 1 month ago, came in ED  as she was unable to swallow for 1 week, she felt her food is being trapped in her esophagus at the level of the chest with occasional nausea and vomiting, she has been able to pas gas and BM, patient had CT chest done showed Wall thickening of the third portion of the duodenum suspect for mass and causing gastric outlet obstruction.  Widespread axillary, mediastinal, hilar, retroperitoneal and pelvic lymphadenopathy compatible with metastatic disease, Multiple irregular shaped opacities in both lungs; favor pneumonia. Metastatic disease is not excluded, patient walks with walker, denies any exertional dysnea or chest pain, Patient's METS is limited, her RCRI is 1, patient labs, EKG and Echo reviewed, patient is at intermediate risk for perioperative cardiovascular complication and is optimized from the medicine point of view for planned procedure.       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        UA suggestive of UTI: Would recommend Zosyn give Lung findings on CT scan, urine cultures in progress, blood cultures negative, could not rule out Pneumonia given possible finding related to Mets, will finalized antibiotics once urine cultures is back, would continue with zosyn for total of 5 days today is day 4, tomorrow is last day of antibiotics    Colon cancer with Metastatic lesions : heme/Onc is following     Hypothyroidism: On Synthroid 100 mcg once a day, if can not tolerate oral would change to IV half a dose     Hx of Seizure: On Phenytoin 100 mg once a day in am and phenytoin 50 mg once a day (at bedtime), if can not tolerate oral the will give IV, will get level, will do seizure precaution.     palliative care is following, patient do not want any aggressive measure,  looking for comfort and hospice care.     Medicine team is signing off, please call as needed, tomorrow is last day for antibiotics.

## 2023-06-18 NOTE — PROGRESS NOTE ADULT - PROBLEM SELECTOR PLAN 1
CT revealed wall thickening of the third portion of the duodenum suspect for mass and causing gastric outlet obstruction, with widespread axillary, mediastinal, hilar, retroperitoneal and pelvic lymphadenopathy. This is most likely causing extrinsic GOO.   Dr. Bae had a detailed discussion about plan of care with the family and the primary team/ consulting services and radiologist. Palliative care following,  Patient deferred venting PEG and J tube feeding tube at this time.    Will plan for EGD with duodenal stent placement possible Monday 06/20  Please keep NPO, NGT to low intermittent suction   IV fluids for hydration  Continue Protonix for GI mucosal protection/ PUD prophylaxis  Trend renal function, electrolytes, replete electrolytes as needed.   Maintain current  T&S, INR <1.5, Hgb >7.0, Plt >50 prior to procedure.  Hold Lovenox after tomorrow's dose for EGD on Monday
Patient now does not want EGD, stent  in case she changes her mind will , keep pt  NPO after midnight   monitor for nausea  and vomiting

## 2023-06-18 NOTE — PROGRESS NOTE ADULT - ASSESSMENT
84 yo F s/p recent R extended hemicolectomy, now with trouble swallowing. CT with largely distended stomach and Duodenal thickening. EGD per GI demonstrating obstructing duodenal lesion seen at the duodenal sweep which did not allow the scope to pass beyond, causing GOO.  Plan:  comfort measures  f/u Palliative care   Pain control  Diet ice chips/sips of water  NGT removed 6/17  Patient refuses NGT  Patient refuses EGD with stenting or any other procedures  VTE ppx   Monitor AM labs

## 2023-06-18 NOTE — PROGRESS NOTE ADULT - SUBJECTIVE AND OBJECTIVE BOX
MARTINA BURROUGHS    [id number]    83y      Female    Patient is a 83y old  Female who presents with a chief complaint of GOO (18 Jun 2023 10:53)      INTERVAL HPI/OVERNIGHT EVENTS:    Patient denies any further burning or difficulty urination, has some abdominal discomfort.       Vital Signs Last 24 Hrs  T(C): 36.5 (18 Jun 2023 07:25), Max: 37.3 (17 Jun 2023 17:09)  T(F): 97.7 (18 Jun 2023 07:25), Max: 99.1 (17 Jun 2023 17:09)  HR: 73 (18 Jun 2023 07:25) (69 - 85)  BP: 153/71 (18 Jun 2023 07:25) (153/71 - 176/74)  BP(mean): 100 (17 Jun 2023 20:00) (100 - 100)  RR: 18 (18 Jun 2023 07:25) (18 - 20)  SpO2: 94% (18 Jun 2023 07:25) (94% - 96%)    Parameters below as of 18 Jun 2023 07:25  Patient On (Oxygen Delivery Method): room air        PHYSICAL EXAM:    GENERAL: Elderly cachetic female looking uncomfortable    HEENT: has NG placed   NECK: soft, Supple, No JVD  CHEST/LUNG: Clear to auscultate bilaterally; No wheezing  HEART: S1S2+, Regular rate and rhythm; No murmurs  ABDOMEN: Soft, Nontender, Nondistended; Bowel sounds present  EXTREMITIES:  1+ Peripheral Pulses, No edema  SKIN: No rashes or lesions  NEURO: AAOX3  PSYCH: normal mood      LABS:                        11.9   6.85  )-----------( 229      ( 18 Jun 2023 06:55 )             34.1     06-18    134<L>  |  101  |  4.0<L>  ----------------------------<  147<H>  3.9   |  21.0<L>  |  0.34<L>    Ca    8.1<L>      18 Jun 2023 06:55  Phos  3.2     06-18  Mg     1.8     06-18      PT/INR - ( 18 Jun 2023 06:55 )   PT: 15.0 sec;   INR: 1.29 ratio                 I&O's Summary    17 Jun 2023 07:01  -  18 Jun 2023 07:00  --------------------------------------------------------  IN: 2000 mL / OUT: 1000 mL / NET: 1000 mL        MEDICATIONS  (STANDING):  benzocaine 20% Spray 1 Spray(s) Topical once  dexAMETHasone  Injectable 2 milliGRAM(s) IV Push two times a day  dextrose 5% + sodium chloride 0.9% with potassium chloride 20 mEq/L 1000 milliLiter(s) (100 mL/Hr) IV Continuous <Continuous>  fosphenytoin IVPB 100 milliGRAM(s) PE IV Intermittent <User Schedule>  fosphenytoin IVPB 50 milliGRAM(s) PE IV Intermittent <User Schedule>  levothyroxine Injectable 60 MICROGram(s) IV Push <User Schedule>  lidocaine   4% Patch 1 Patch Transdermal daily  pantoprazole  Injectable 40 milliGRAM(s) IV Push two times a day  phenytoin   Capsule 100 milliGRAM(s) Oral daily  phenytoin   Chewable 50 milliGRAM(s) Chew at bedtime  piperacillin/tazobactam IVPB.. 3.375 Gram(s) IV Intermittent every 8 hours    MEDICATIONS  (PRN):  hydrALAZINE Injectable 5 milliGRAM(s) IV Push every 6 hours PRN hypertension  HYDROmorphone  Injectable 0.2 milliGRAM(s) IV Push every 4 hours PRN Moderate Pain (4 - 6)  HYDROmorphone  Injectable 0.5 milliGRAM(s) IV Push every 6 hours PRN Severe Pain (7 - 10)  LORazepam   Injectable 0.25 milliGRAM(s) IV Push two times a day PRN Anxiety  ondansetron Injectable 4 milliGRAM(s) IV Push every 6 hours PRN Nausea
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
## 2023-06-19 ENCOUNTER — TRANSCRIPTION ENCOUNTER (OUTPATIENT)
Age: 83
End: 2023-06-19

## 2023-06-19 VITALS
DIASTOLIC BLOOD PRESSURE: 72 MMHG | OXYGEN SATURATION: 94 % | RESPIRATION RATE: 18 BRPM | HEART RATE: 65 BPM | TEMPERATURE: 98 F | SYSTOLIC BLOOD PRESSURE: 170 MMHG

## 2023-06-19 PROCEDURE — 96374 THER/PROPH/DIAG INJ IV PUSH: CPT

## 2023-06-19 PROCEDURE — 82435 ASSAY OF BLOOD CHLORIDE: CPT

## 2023-06-19 PROCEDURE — 86870 RBC ANTIBODY IDENTIFICATION: CPT

## 2023-06-19 PROCEDURE — 86703 HIV-1/HIV-2 1 RESULT ANTBDY: CPT

## 2023-06-19 PROCEDURE — 83690 ASSAY OF LIPASE: CPT

## 2023-06-19 PROCEDURE — 99497 ADVNCD CARE PLAN 30 MIN: CPT | Mod: 25

## 2023-06-19 PROCEDURE — 71260 CT THORAX DX C+: CPT | Mod: MA

## 2023-06-19 PROCEDURE — 85610 PROTHROMBIN TIME: CPT

## 2023-06-19 PROCEDURE — 93005 ELECTROCARDIOGRAM TRACING: CPT

## 2023-06-19 PROCEDURE — 85025 COMPLETE CBC W/AUTO DIFF WBC: CPT

## 2023-06-19 PROCEDURE — 74177 CT ABD & PELVIS W/CONTRAST: CPT | Mod: MG

## 2023-06-19 PROCEDURE — 82803 BLOOD GASES ANY COMBINATION: CPT

## 2023-06-19 PROCEDURE — 81001 URINALYSIS AUTO W/SCOPE: CPT

## 2023-06-19 PROCEDURE — 96375 TX/PRO/DX INJ NEW DRUG ADDON: CPT

## 2023-06-19 PROCEDURE — 36415 COLL VENOUS BLD VENIPUNCTURE: CPT

## 2023-06-19 PROCEDURE — 99233 SBSQ HOSP IP/OBS HIGH 50: CPT

## 2023-06-19 PROCEDURE — 80053 COMPREHEN METABOLIC PANEL: CPT

## 2023-06-19 PROCEDURE — 88305 TISSUE EXAM BY PATHOLOGIST: CPT

## 2023-06-19 PROCEDURE — 85730 THROMBOPLASTIN TIME PARTIAL: CPT

## 2023-06-19 PROCEDURE — 87086 URINE CULTURE/COLONY COUNT: CPT

## 2023-06-19 PROCEDURE — 99232 SBSQ HOSP IP/OBS MODERATE 35: CPT

## 2023-06-19 PROCEDURE — 36000 PLACE NEEDLE IN VEIN: CPT

## 2023-06-19 PROCEDURE — 86900 BLOOD TYPING SEROLOGIC ABO: CPT

## 2023-06-19 PROCEDURE — 87077 CULTURE AEROBIC IDENTIFY: CPT

## 2023-06-19 PROCEDURE — 86850 RBC ANTIBODY SCREEN: CPT

## 2023-06-19 PROCEDURE — 84100 ASSAY OF PHOSPHORUS: CPT

## 2023-06-19 PROCEDURE — 83735 ASSAY OF MAGNESIUM: CPT

## 2023-06-19 PROCEDURE — 82947 ASSAY GLUCOSE BLOOD QUANT: CPT

## 2023-06-19 PROCEDURE — 76937 US GUIDE VASCULAR ACCESS: CPT | Mod: 26

## 2023-06-19 PROCEDURE — 84295 ASSAY OF SERUM SODIUM: CPT

## 2023-06-19 PROCEDURE — 82962 GLUCOSE BLOOD TEST: CPT

## 2023-06-19 PROCEDURE — 74018 RADEX ABDOMEN 1 VIEW: CPT

## 2023-06-19 PROCEDURE — 85027 COMPLETE CBC AUTOMATED: CPT

## 2023-06-19 PROCEDURE — 84132 ASSAY OF SERUM POTASSIUM: CPT

## 2023-06-19 PROCEDURE — 86901 BLOOD TYPING SEROLOGIC RH(D): CPT

## 2023-06-19 PROCEDURE — 80048 BASIC METABOLIC PNL TOTAL CA: CPT

## 2023-06-19 PROCEDURE — 83605 ASSAY OF LACTIC ACID: CPT

## 2023-06-19 PROCEDURE — 85014 HEMATOCRIT: CPT

## 2023-06-19 PROCEDURE — 88342 IMHCHEM/IMCYTCHM 1ST ANTB: CPT

## 2023-06-19 PROCEDURE — 82330 ASSAY OF CALCIUM: CPT

## 2023-06-19 PROCEDURE — 71045 X-RAY EXAM CHEST 1 VIEW: CPT

## 2023-06-19 PROCEDURE — 87040 BLOOD CULTURE FOR BACTERIA: CPT

## 2023-06-19 PROCEDURE — G1004: CPT

## 2023-06-19 PROCEDURE — 70491 CT SOFT TISSUE NECK W/DYE: CPT | Mod: MG

## 2023-06-19 PROCEDURE — 86880 COOMBS TEST DIRECT: CPT

## 2023-06-19 PROCEDURE — 87186 SC STD MICRODIL/AGAR DIL: CPT

## 2023-06-19 PROCEDURE — 99285 EMERGENCY DEPT VISIT HI MDM: CPT | Mod: 25

## 2023-06-19 PROCEDURE — 85018 HEMOGLOBIN: CPT

## 2023-06-19 RX ORDER — HYDRALAZINE HCL 50 MG
5 TABLET ORAL
Qty: 0 | Refills: 0 | DISCHARGE
Start: 2023-06-19

## 2023-06-19 RX ORDER — PANTOPRAZOLE SODIUM 20 MG/1
40 TABLET, DELAYED RELEASE ORAL
Qty: 0 | Refills: 0 | DISCHARGE
Start: 2023-06-19

## 2023-06-19 RX ORDER — ONDANSETRON 8 MG/1
4 TABLET, FILM COATED ORAL
Qty: 0 | Refills: 0 | DISCHARGE
Start: 2023-06-19

## 2023-06-19 RX ORDER — LIDOCAINE 4 G/100G
1 CREAM TOPICAL
Qty: 0 | Refills: 0 | DISCHARGE
Start: 2023-06-19

## 2023-06-19 RX ORDER — DEXAMETHASONE 0.5 MG/5ML
8.33 ELIXIR ORAL
Qty: 0 | Refills: 0 | DISCHARGE
Start: 2023-06-19

## 2023-06-19 RX ORDER — BENZOCAINE 10 %
1 GEL (GRAM) MUCOUS MEMBRANE
Qty: 0 | Refills: 0 | DISCHARGE
Start: 2023-06-19

## 2023-06-19 RX ORDER — DEXTROSE MONOHYDRATE, SODIUM CHLORIDE, AND POTASSIUM CHLORIDE 50; .745; 4.5 G/1000ML; G/1000ML; G/1000ML
0 INJECTION, SOLUTION INTRAVENOUS
Qty: 0 | Refills: 0 | DISCHARGE
Start: 2023-06-19

## 2023-06-19 RX ADMIN — HYDROMORPHONE HYDROCHLORIDE 0.5 MILLIGRAM(S): 2 INJECTION INTRAMUSCULAR; INTRAVENOUS; SUBCUTANEOUS at 00:03

## 2023-06-19 RX ADMIN — FOSPHENYTOIN 104 MILLIGRAM(S) PE: 50 INJECTION INTRAMUSCULAR; INTRAVENOUS at 15:14

## 2023-06-19 RX ADMIN — HYDROMORPHONE HYDROCHLORIDE 0.5 MILLIGRAM(S): 2 INJECTION INTRAMUSCULAR; INTRAVENOUS; SUBCUTANEOUS at 12:42

## 2023-06-19 RX ADMIN — LIDOCAINE 1 PATCH: 4 CREAM TOPICAL at 12:46

## 2023-06-19 RX ADMIN — Medication 60 MICROGRAM(S): at 05:35

## 2023-06-19 RX ADMIN — Medication 2 MILLIGRAM(S): at 05:36

## 2023-06-19 RX ADMIN — PANTOPRAZOLE SODIUM 40 MILLIGRAM(S): 20 TABLET, DELAYED RELEASE ORAL at 05:34

## 2023-06-19 RX ADMIN — ONDANSETRON 4 MILLIGRAM(S): 8 TABLET, FILM COATED ORAL at 12:50

## 2023-06-19 RX ADMIN — PIPERACILLIN AND TAZOBACTAM 25 GRAM(S): 4; .5 INJECTION, POWDER, LYOPHILIZED, FOR SOLUTION INTRAVENOUS at 05:34

## 2023-06-19 RX ADMIN — LIDOCAINE 1 PATCH: 4 CREAM TOPICAL at 00:03

## 2023-06-19 RX ADMIN — HYDROMORPHONE HYDROCHLORIDE 0.5 MILLIGRAM(S): 2 INJECTION INTRAMUSCULAR; INTRAVENOUS; SUBCUTANEOUS at 13:30

## 2023-06-19 NOTE — PROGRESS NOTE ADULT - PROVIDER SPECIALTY LIST ADULT
Colorectal Surgery
Gastroenterology
Colorectal Surgery
Hospitalist
Surgery
Surgery
Gastroenterology
Palliative Care
Gastroenterology
98.2
Gastroenterology

## 2023-06-19 NOTE — PROGRESS NOTE ADULT - ASSESSMENT
83 yr woman  with recently diagnosed colon cancer s/p R extended hemicolectomy with SBR and primary anastomosis on 4/12/23 admitted with gastric outlet obstruction     GOO  NGT for decompression - fell out over the weekend, patient does not want reinserted  PPI  Decadron  2mg IVP q12   patient does not want stent    Colon Cancer  Oncology consulted  Patient does not want further interventions    Cancer Related Pain  Dilaudid 0.2/0.5  mg IVP mod/severe pain  Patient taking multiple PRN doses  Recommend to make ATC Dilaudid 0.2mg IVP q8hr for more seamless symptom relief  Lidocaine patch to back - reposition     Anxiety / Insomnia  Ativan 0.25mg IVP bid PRN - increase to 0.5mg IVP PRN if current dose not effective    Nausea  Zofran  IVP PRN    Seizure hx  Cont IV Cerbyx    Encounter for Palliative Care  See [facility] note above for details. In summary  1.  Patient has decided not to undergo stent  2.  Hospice inpatient transfer    - Midline to be placed.  Patient and daughter agreed  Psychosocial uspport

## 2023-06-19 NOTE — PROGRESS NOTE ADULT - SUBJECTIVE AND OBJECTIVE BOX
INTERVAL HPI/OVERNIGHT EVENTS:Patient seen and examined today.  Patient with gastric outlet obstruction most likely related to the retroperitoneal lymphadenopathy.  She declined the procedures yesterday.  She was worried about NG tube.  I explained the procedure at length again today.    MEDICATIONS  (STANDING):  benzocaine 20% Spray 1 Spray(s) Topical once  dexAMETHasone  Injectable 2 milliGRAM(s) IV Push two times a day  dextrose 5% + sodium chloride 0.9% with potassium chloride 20 mEq/L 1000 milliLiter(s) (100 mL/Hr) IV Continuous <Continuous>  fosphenytoin IVPB 100 milliGRAM(s) PE IV Intermittent <User Schedule>  fosphenytoin IVPB 50 milliGRAM(s) PE IV Intermittent <User Schedule>  levothyroxine Injectable 60 MICROGram(s) IV Push <User Schedule>  lidocaine   4% Patch 1 Patch Transdermal daily  pantoprazole  Injectable 40 milliGRAM(s) IV Push two times a day  phenytoin   Capsule 100 milliGRAM(s) Oral daily  phenytoin   Chewable 50 milliGRAM(s) Chew at bedtime  piperacillin/tazobactam IVPB.. 3.375 Gram(s) IV Intermittent every 8 hours    MEDICATIONS  (PRN):  hydrALAZINE Injectable 5 milliGRAM(s) IV Push every 6 hours PRN hypertension  HYDROmorphone  Injectable 0.2 milliGRAM(s) IV Push every 4 hours PRN Moderate Pain (4 - 6)  HYDROmorphone  Injectable 0.5 milliGRAM(s) IV Push every 6 hours PRN Severe Pain (7 - 10)  HYDROmorphone  Injectable 0.5 milliGRAM(s) IV Push every 4 hours PRN breakthrough pain  LORazepam   Injectable 0.25 milliGRAM(s) IV Push two times a day PRN Anxiety  ondansetron Injectable 4 milliGRAM(s) IV Push every 6 hours PRN Nausea      Allergies    No Known Allergies    Intolerances        Vital Signs Last 24 Hrs  T(C): 36.9 (19 Jun 2023 08:00), Max: 36.9 (19 Jun 2023 08:00)  T(F): 98.4 (19 Jun 2023 08:00), Max: 98.4 (19 Jun 2023 08:00)  HR: 65 (19 Jun 2023 08:00) (65 - 76)  BP: 162/69 (19 Jun 2023 08:00) (146/72 - 174/78)  BP(mean): 102 (18 Jun 2023 22:48) (102 - 102)  RR: 18 (19 Jun 2023 08:00) (18 - 18)  SpO2: 93% (19 Jun 2023 08:00) (93% - 96%)    Parameters below as of 19 Jun 2023 08:00  Patient On (Oxygen Delivery Method): room air        LABS:                        11.9   6.85  )-----------( 229      ( 18 Jun 2023 06:55 )             34.1     06-18    134<L>  |  101  |  4.0<L>  ----------------------------<  147<H>  3.9   |  21.0<L>  |  0.34<L>    Ca    8.1<L>      18 Jun 2023 06:55  Phos  3.2     06-18  Mg     1.8     06-18      PT/INR - ( 18 Jun 2023 06:55 )   PT: 15.0 sec;   INR: 1.29 ratio               RADIOLOGY & ADDITIONAL TESTS:

## 2023-06-19 NOTE — DISCHARGE NOTE NURSING/CASE MANAGEMENT/SOCIAL WORK - NSDCVIVACCINE_GEN_ALL_CORE_FT
Influenza, seasonal, injectable; 2014/10/0 ; Margo Rico (RN);   pneumococcal polysaccharide PPV23; 29-Oct-2014 14:45; Maribel Guadalupe (RN); [employer] [id number]; IntraMuscular; Deltoid Left.; 0.5 milliLiter(s);

## 2023-06-19 NOTE — PROGRESS NOTE ADULT - CONVERSATION DETAILS
Met with patient - at first was considering having stent placed  and now has decided she would not want the procedures.    Discussed hospice inpatient transfer for which she was agreeable    Spoke to daughter Herminia who states she spoke to her mother and in agreement with plan above. She states she knows her mother's cancer is aggressive as told to her by the oncologist.  She wants her mother to be comfortable in whatever time she has left.   She requested [facility]

## 2023-06-19 NOTE — PROGRESS NOTE ADULT - ATTENDING COMMENTS
Patient seen and examined this morning. She expressed her wishes for comfort care and does not want a GJ tube or any external tubes. She complains of back pain and some abdominal pain. I spoke to her at length as well as the daughter about her wishes and how to proceed. At this time, may pursue endoscopic stent placement to help with gastric outlet obstruction but overall plan is towards comfort care.
As above  No change in A/P.
Patient seen and examined this morning. Her NG fell out inadvertently yesterday and was left out. She denies any nausea or emesis. Abdomen is soft and non tender. She does not want to proceed with EGD with stent placement tomorrow. She understands that this could dilation of her stomach (especially with NG out) with associated pain, possible aspiration. She would like to move forward with comfort cares only.
Patient seen and examined. She is stable and back pain and abdominal discomfort helped with lidocaine patch and hot packs respectively. Plan is for EGD with stent placement on Monday and transition to palliative cre/hospice.

## 2023-06-19 NOTE — DISCHARGE NOTE PROVIDER - HOSPITAL COURSE
Patient is an 84 yo F with a hx of colon ca, s/p R hemicolectomy with SBR and primary anastomosis 1 mo prior to presenting to the ED 6/15 c/o inability to swallow for a week. CT in the ED showed a largely distended stomach and duodenal thickening. She was admitted to the colorectal service and an NGT was placed for decompression. She was also started on IV antibiotics for UTI and pulmonary findings consistent with pneumonia on CT. Medicine, GI, hem/onc, and palliative were consulted. GI planned to do EGD 6/16 but patient thenr refused it, as well refused her NGT to be replaced when it fell out. Patient then requested comfort measures and no longer wanted interventions.      Patient is an 84 yo F with a hx of colon ca, s/p R hemicolectomy with SBR and primary anastomosis 1 mo prior to presenting to the ED 6/15 c/o inability to swallow for a week. CT in the ED showed a largely distended stomach and duodenal thickening. She was admitted to the colorectal service and an NGT was placed for decompression. She was also started on IV antibiotics for UTI and pulmonary findings consistent with pneumonia on CT. Medicine, GI, hem/onc, and palliative were consulted. GI planned to do EGD 6/16 but patient thenr refused it, as well refused her NGT to be replaced when it fell out. Patient then requested comfort measures and no longer wanted interventions. She was discharged to inpatient hospice.

## 2023-06-19 NOTE — PROGRESS NOTE ADULT - NUTRITIONAL ASSESSMENT
This patient has been assessed with a concern for Malnutrition and has been determined to have a diagnosis/diagnoses of Severe protein-calorie malnutrition and Underweight (BMI < 19).    This patient is being managed with:   Diet NPO-  With Ice Chips/Sips of Water  Entered: Jun 16 2023  4:31PM  
This patient has been assessed with a concern for Malnutrition and has been determined to have a diagnosis/diagnoses of Severe protein-calorie malnutrition and Underweight (BMI < 19).    This patient is being managed with:   Diet NPO-  With Ice Chips/Sips of Water  Entered: Jun 16 2023  4:31PM

## 2023-06-19 NOTE — DISCHARGE NOTE NURSING/CASE MANAGEMENT/SOCIAL WORK - PATIENT PORTAL LINK FT
You can access the FollowMyHealth Patient Portal offered by [facility] by registering at the following website: [email or web address]. By joining [employer]’s FollowMyHealth portal, you will also be able to view your health information using other applications (apps) compatible with our system.

## 2023-06-19 NOTE — PROGRESS NOTE ADULT - ASSESSMENT
Patient with gastric outlet obstruction related to retroperitoneal lymphadenopathy and possible metastatic pathology.  Explained the procedure at length that if the duodenal stent procedure goes well, she may not need NG tube placement.  Addressed all the risks and benefits of the procedure at length.  Patient will think about the procedure and then we will reapproach her again if she agrees and consents for the procedure.

## 2023-06-19 NOTE — DISCHARGE NOTE PROVIDER - NSDCMRMEDTOKEN_GEN_ALL_CORE_FT
acetaminophen 325 mg oral tablet: 2 tab(s) orally every 6 hours, As needed, Mild Pain  CoQ10 100 mg oral capsule: 1 orally once a day  D3 25 mcg (1000 intl units) oral tablet: 1 orally once a day  Multiple Vitamins oral tablet: 1 orally once a day  Osteo Bi-Flex Triple Strength oral tablet: 2 tab(s) orally once a day  phenytoin 100 mg oral capsule: 1 tab(s) orally once a day  phenytoin 50 mg oral tablet, chewable: 1 orally once a day (at bedtime)  Synthroid 100 mcg (0.1 mg) oral tablet: 1 orally once a day *patient takes brand name at home *  turmeric 500 mg oral capsule: 1 orally once a day   acetaminophen 325 mg oral tablet: 2 tab(s) orally every 6 hours, As needed, Mild Pain  benzocaine 20% topical spray: 1 Apply topically to affected area once  CoQ10 100 mg oral capsule: 1 orally once a day  D3 25 mcg (1000 intl units) oral tablet: 1 orally once a day  dexAMETHasone 6 mg/25 mL-NaCl 0.9% intravenous solution: 8.33 milliliter(s) intravenous 2 times a day  Dextrose 5% with 0.9% NaCl and KCl 20 mEq/L intravenous solution: 1000 milliliter(s) intravenous  hydrALAZINE 20 mg/mL injectable solution: 5 milligram(s) injectable every 8 hours as needed for Hypertension  lidocaine 4% topical film: Apply topically to affected area once a day  LORazepam: 0.25 milligram(s) 2 times a day as needed for  anxiety  Multiple Vitamins oral tablet: 1 orally once a day  ondansetron 2 mg/mL injectable solution: 4 injectable every 8 hours  Osteo Bi-Flex Triple Strength oral tablet: 2 tab(s) orally once a day  pantoprazole 40 mg intravenous injection: 40 milligram(s) intravenous 2 times a day  phenytoin 100 mg oral capsule, extended release: 1 cap(s) orally once a day  phenytoin 50 mg oral tablet, chewable: 1 tab(s) orally once a day (at bedtime)  Synthroid 100 mcg (0.1 mg) oral tablet: 1 orally once a day *patient takes brand name at home *  turmeric 500 mg oral capsule: 1 orally once a day

## 2023-06-19 NOTE — PROGRESS NOTE ADULT - SUBJECTIVE AND OBJECTIVE BOX
CC:  Follow up GOC , Symptoms    OVERNIGHT EVENTS:  events noted  Patient deciding not to proceed with stent    Present Symptoms:   Dyspnea:  No   Nausea/Vomiting:  Yes  Anxiety:   Yes    Depression:  No    Fatigue:  Yes     Loss of appetite:   Yes   Constipation: Kat    Pain:  yes            Location   abdomen            Duration on/off            Character ache            Severity severe            Factors            Effect    Pain AD Score:  http://geriatrictoolkit.[facility]/cog/painad.pdf (press ctrl + left click to view)    Review of Systems: Reviewed as above  All others negative        MEDICATIONS  (STANDING):  benzocaine 20% Spray 1 Spray(s) Topical once  dexAMETHasone  Injectable 2 milliGRAM(s) IV Push two times a day  dextrose 5% + sodium chloride 0.9% with potassium chloride 20 mEq/L 1000 milliLiter(s) (100 mL/Hr) IV Continuous <Continuous>  fosphenytoin IVPB 100 milliGRAM(s) PE IV Intermittent <User Schedule>  fosphenytoin IVPB 50 milliGRAM(s) PE IV Intermittent <User Schedule>  levothyroxine Injectable 60 MICROGram(s) IV Push <User Schedule>  lidocaine   4% Patch 1 Patch Transdermal daily  pantoprazole  Injectable 40 milliGRAM(s) IV Push two times a day  phenytoin   Capsule 100 milliGRAM(s) Oral daily  phenytoin   Chewable 50 milliGRAM(s) Chew at bedtime    MEDICATIONS  (PRN):  hydrALAZINE Injectable 5 milliGRAM(s) IV Push every 6 hours PRN hypertension  HYDROmorphone  Injectable 0.2 milliGRAM(s) IV Push every 4 hours PRN Moderate Pain (4 - 6)  HYDROmorphone  Injectable 0.5 milliGRAM(s) IV Push every 6 hours PRN Severe Pain (7 - 10)  HYDROmorphone  Injectable 0.5 milliGRAM(s) IV Push every 4 hours PRN breakthrough pain  LORazepam   Injectable 0.25 milliGRAM(s) IV Push two times a day PRN Anxiety  ondansetron Injectable 4 milliGRAM(s) IV Push every 6 hours PRN Nausea      PHYSICAL EXAM:    Vital Signs Last 24 Hrs  T(C): 36.9 (19 Jun 2023 08:00), Max: 36.9 (19 Jun 2023 08:00)  T(F): 98.4 (19 Jun 2023 08:00), Max: 98.4 (19 Jun 2023 08:00)  HR: 65 (19 Jun 2023 08:00) (65 - 76)  BP: 162/69 (19 Jun 2023 08:00) (146/72 - 174/78)  BP(mean): 102 (18 Jun 2023 22:48) (102 - 102)  RR: 18 (19 Jun 2023 08:00) (18 - 18)  SpO2: 93% (19 Jun 2023 08:00) (93% - 96%)    Parameters below as of 19 Jun 2023 08:00  Patient On (Oxygen Delivery Method): room air    Karnofsky: 20%  General:  Elderly woman anxious       HEENT:  NCAT      Lungs: comfortable  non labored  CV:  RR  GI:   soft, slight  distended mild tenderness, no guarding  MSK: no contractures  Skin:  warm/dry  Neuro  no focal deficits  Psych  anxious    LABS:                          11.9   6.85  )-----------( 229      ( 18 Jun 2023 06:55 )             34.1     06-18    134<L>  |  101  |  4.0<L>  ----------------------------<  147<H>  3.9   |  21.0<L>  |  0.34<L>    Ca    8.1<L>      18 Jun 2023 06:55  Phos  3.2     06-18  Mg     1.8     06-18      PT/INR - ( 18 Jun 2023 06:55 )   PT: 15.0 sec;   INR: 1.29 ratio             I&O's Summary    18 Jun 2023 07:01  -  19 Jun 2023 07:00  --------------------------------------------------------  IN: 1500 mL / OUT: 0 mL / NET: 1500 mL        RADIOLOGY & ADDITIONAL STUDIES:  Imaging Reviewed  (   )     < from: CT Chest w/ IV Cont (06.14.23 @ 22:40) >  ACC: [id number] EXAM:  CT CHEST IC   ORDERED BY: GRACIELA VILLEGAS     PROCEDURE DATE:  06/14/2023          INTERPRETATION:  CLINICAL INFORMATION: History of cancer, difficulty   swallowing.    COMPARISON: Abdominal CT October 26, 2014    CONTRAST/COMPLICATIONS:  IV Contrast: IV contrast documented in unlinked concurrent exam  95 cc   administered   5 cc discarded  Oral Contrast: NONE  Complications: None reported at time of study completion    PROCEDURE:  CT of the Chest, Abdomen and Pelvis was performed.  Sagittal and coronal reformats were performed.    FINDINGS:  CHEST:  LUNGS AND LARGE AIRWAYS: Patent central airways. Multiple irregular   shaped opacities throughout both lungs with surrounding reticulonodular   opacity compatible with airways impaction.  PLEURA: No pleural effusion.  VESSELS: Atherosclerotic changes of the aorta and coronary arteries.  HEART: Heart size is normal. No pericardial effusion.  MEDIASTINUM AND KODY: Extensive mediastinal and hilar lymphadenopathy   with representative measurements as follows:  *  3.6 x 3.5 cm necrotic right hilar lymph node (4; 66)  *  2.6 x 1.7 cm necrotic left hilar lymph node ((4; 71).  *  1.5 x 2.3 cm right lower paratracheal node (4; 54)  CHEST WALL AND LOWER NECK: Extensive left cervical and supraclavicular   lymphadenopathy as described on CT neck performed at the same time.   Bilateral axillary lymphadenopathy measuring up to 2.4 x 1.8 cm on the   right (4; 50) and 1.6 x 2.0 cm on the left (4; 37).    ABDOMEN AND PELVIS:  LIVER:2.3 cm posterior right hepatic lobe hemangioma. Subcentimeter   hypodense right hepatic lobe lesion which is too small to characterize.  BILE DUCTS: Normal caliber.  GALLBLADDER: Within normal limits.  SPLEEN: Within normal limits.  PANCREAS: Within normal limits.  ADRENALS: Within normal limits.  KIDNEYS/URETERS: Within normal limits.    BLADDER: Underdistended, limiting evaluation.  REPRODUCTIVE ORGANS: Calcified uterine fibroid. No adnexal mass.    BOWEL: Circumferential wall thickening of thethird portion of the   duodenum with dilatation of the stomach and proximal duodenum. Right   colon resection and ileocolic anastomosis.  PERITONEUM: No ascites.  VESSELS: Atherosclerotic changes.  RETROPERITONEUM/LYMPH NODES: Extensive retroperitoneal and pelvic   lymphadenopathy with representative measurements as follows:  *  1.4 x 2.8 cm portacaval node (4; 155)  *  1.1 x 1.6 cm left para-aortic lymph node (4; 165)  *  1.0 x 1.6 cm left common iliac node (4; 211).  ABDOMINAL WALL: Midline postsurgical changes.  BONES: Osteopenia. Severe left hip degenerative change.    IMPRESSION:  Wall thickening of the third portion of the duodenum suspect for mass and   causing gastric outlet obstruction.    Widespread axillary, mediastinal, hilar, retroperitoneal and pelvic   lymphadenopathy compatible with metastatic disease.    Multiple irregular shaped opacities in both lungs; favor pneumonia.   Metastatic disease is not excluded. Follow-up CT in 6 weeks after medical   treatment is recommended.        --- End of Report ---            WILBERTO RAMIRES MD; Attending Radiologist  This document has been electronically signed. Robert 15 2023  9:55AM    < end of copied text >      ADVANCE DIRECTIVE PREFERENCES    DNR/I  and comfort measures

## 2023-06-19 NOTE — PROGRESS NOTE ADULT - TIME BILLING
Time spent with review of chart documents, labs, imaging. Direct patient assessment,  formulation of care plan. Discussion with  Interdisciplinary  team  Surgery Dr. Schulz,  RN,  JODIE De La Cruz,  patient and daughter  including ACP  20_____minutes, this time is exclusive from patient encounter

## 2023-06-19 NOTE — CHART NOTE - NSCHARTNOTEFT_GEN_A_CORE
89 year lod F admitted with gastric outlet obstruction related to retroperitoneal lymphadenopathy and possible metastatic pathology.  Reassessed the patient later this afternoon. Patient defer the procedure stating "I decided not to proceed" with the endoscopy.   GI will sign off at this, please call us back with any questions or concern.
I spoke to the patient, her daughter at bedside.  Explained all the discussion throughout the day at length.  Explained the plan EGD with stent placement and risk and benefits of the procedure.  The family will discuss together and then let us know.  In the interim continue NG tube and start PPI twice daily.  EGD with stent placement hopefully on Monday.
NGT placed. 200cc gastric fluid came out. NGT placed to LCWS.  Will confirm placement of the NGT with CXR.

## 2023-06-19 NOTE — DISCHARGE NOTE PROVIDER - CARE PROVIDER_API CALL
Kieran Juarez.  Colon/Rectal Surgery  [address] [facility], [place] [address]  Phone: [phone]  Fax: [phone]  Follow Up Time: 2 weeks

## 2023-06-19 NOTE — DISCHARGE NOTE PROVIDER - DETAILS OF MALNUTRITION DIAGNOSIS/DIAGNOSES
This patient has been assessed with a concern for Malnutrition and was treated during this hospitalization for the following Nutrition diagnosis/diagnoses:     -  06/17/2023: Severe protein-calorie malnutrition   -  06/17/2023: Underweight (BMI < 19)

## 2023-06-19 NOTE — PROGRESS NOTE ADULT - SUBJECTIVE AND OBJECTIVE BOX
Subjective: Patient seen and examined at bedside, no acute complaints, still does not want EGD or any other procedures at this time. Denies nausea or vomiting.     MEDICATIONS  (STANDING):  benzocaine 20% Spray 1 Spray(s) Topical once  dexAMETHasone  Injectable 2 milliGRAM(s) IV Push two times a day  dextrose 5% + sodium chloride 0.9% with potassium chloride 20 mEq/L 1000 milliLiter(s) (100 mL/Hr) IV Continuous <Continuous>  fosphenytoin IVPB 100 milliGRAM(s) PE IV Intermittent <User Schedule>  fosphenytoin IVPB 50 milliGRAM(s) PE IV Intermittent <User Schedule>  levothyroxine Injectable 60 MICROGram(s) IV Push <User Schedule>  lidocaine   4% Patch 1 Patch Transdermal daily  pantoprazole  Injectable 40 milliGRAM(s) IV Push two times a day  phenytoin   Capsule 100 milliGRAM(s) Oral daily  phenytoin   Chewable 50 milliGRAM(s) Chew at bedtime  piperacillin/tazobactam IVPB.. 3.375 Gram(s) IV Intermittent every 8 hours    MEDICATIONS  (PRN):  hydrALAZINE Injectable 5 milliGRAM(s) IV Push every 6 hours PRN hypertension  HYDROmorphone  Injectable 0.2 milliGRAM(s) IV Push every 4 hours PRN Moderate Pain (4 - 6)  HYDROmorphone  Injectable 0.5 milliGRAM(s) IV Push every 6 hours PRN Severe Pain (7 - 10)  HYDROmorphone  Injectable 0.5 milliGRAM(s) IV Push every 4 hours PRN breakthrough pain  LORazepam   Injectable 0.25 milliGRAM(s) IV Push two times a day PRN Anxiety  ondansetron Injectable 4 milliGRAM(s) IV Push every 6 hours PRN Nausea    Vital Signs Last 24 Hrs  T(C): 36.7 (19 Jun 2023 04:17), Max: 36.7 (18 Jun 2023 20:00)  T(F): 98 (19 Jun 2023 04:17), Max: 98.1 (18 Jun 2023 20:00)  HR: 69 (19 Jun 2023 04:17) (69 - 76)  BP: 146/72 (19 Jun 2023 04:17) (146/72 - 174/78)  BP(mean): 102 (18 Jun 2023 22:48) (102 - 102)  RR: 18 (19 Jun 2023 04:17) (18 - 18)  SpO2: 94% (19 Jun 2023 04:17) (94% - 96%)  Parameters below as of 19 Jun 2023 04:17  Patient On (Oxygen Delivery Method): room air    Physical Exam:  Constitutional: NAD  HEENT: PERRL, EOMI  Neck: No JVD, FROM without pain  Respiratory: Respirations non-labored, no accessory muscle use  Gastrointestinal: Soft, non-tender, non-distended  Extremities: No peripheral edema, No cyanosis    LABS:    A: Patient is an 84 yo F s/p recent R extended hemicolectomy, now with trouble swallowing. CT with largely distended stomach and Duodenal thickening. EGD per GI demonstrating obstructing duodenal lesion seen at the duodenal sweep which did not allow the scope to pass beyond, causing GOO. She had an NGT which fell out and she did not want to be replaced. Was planned     Plan:  comfort measures  f/u Palliative care   Pain control  Diet ice chips/sips of water

## 2023-06-19 NOTE — DISCHARGE NOTE PROVIDER - NSDCCPCAREPLAN_GEN_ALL_CORE_FT
PRINCIPAL DISCHARGE DIAGNOSIS  Diagnosis: Gastric outlet obstruction  Assessment and Plan of Treatment:

## 2023-06-20 LAB
CULTURE RESULTS: SIGNIFICANT CHANGE UP
SPECIMEN SOURCE: SIGNIFICANT CHANGE UP

## 2023-06-21 ENCOUNTER — NON-APPOINTMENT (OUTPATIENT)
Age: 83
End: 2023-06-21

## 2023-06-27 ENCOUNTER — APPOINTMENT (OUTPATIENT)
Dept: COLORECTAL SURGERY | Facility: CLINIC | Age: 83
End: 2023-06-27

## 2023-06-28 LAB — SURGICAL PATHOLOGY STUDY: SIGNIFICANT CHANGE UP

## 2023-08-15 NOTE — H&P PST ADULT - NSANTHOSAYNRD_GEN_A_CORE
No. CHRIS screening performed.  STOP BANG Legend: 0-2 = LOW Risk; 3-4 = INTERMEDIATE Risk; 5-8 = HIGH Risk Drysol Counseling:  I discussed with the patient the risks of drysol/aluminum chloride including but not limited to skin rash, itching, irritation, burning.

## 2023-11-03 ENCOUNTER — APPOINTMENT (OUTPATIENT)
Dept: COLORECTAL SURGERY | Facility: CLINIC | Age: 83
End: 2023-11-03

## 2023-11-06 NOTE — DISCHARGE NOTE NURSING/CASE MANAGEMENT/SOCIAL WORK - NSTRANSFERDENTURES_GEN_A_NUR
MRI STRESS       Stress protocol: Regadenoson  Regadenoson Dose: 0.4mg  Aminophylline Dose: 100mg     Resting Vitals:  BP: 114/69  HR: 87 bpm  SPO2: 99% RA  Resting ECG: NSR with nonspecific T wave abnormality     Stress:  0.4mg IV push Regadenoson:  1 min: /67  bpm 98% RA Symptoms: “A little shortness of breath”  2 min: /60  bpm 99% RA Symptoms: “A little shortness of breath”     Reversal/Recovery:  100mg IV push Aminophylline:  1 min: /62  bpm 97% RA Symptoms: Denies  2 min: /62 HR   99 bpm 98% RA Symptoms: Denies  4 min: /63 HR   99 bpm 98% RA Symptoms: Denies  6 min: /68 HR   96 bpm 98% RA Symptoms: Denies    Patients resting HR of 87 bpm richie to a maximum of 108 bpm. Resting BP of 114/69 was the highest of the exam.  Patients post stress EKG remained unchanged. Patient discharged from the [facility] to home independently.    
full

## 2024-01-30 NOTE — H&P ADULT - NSICDXFAMILYHX_GEN_ALL_CORE_FT
H&P reviewed. After examining the patient I find no changes in the patients condition since the H&P had been written. FAMILY HISTORY:  Father  Still living? No  FH: heart attack, Age at diagnosis: Age Unknown    Grandparent  Still living? No  FH: type 2 diabetes, Age at diagnosis: Age Unknown

## 2024-05-31 NOTE — DIETITIAN INITIAL EVALUATION ADULT - NUTRITON FOCUSED PHYSICAL EXAM
[Home] : at home, [unfilled] , at the time of the visit. [Medical Office: ([facility])___] : at the medical office located in  [Family Member] : family member [Formal Caregiver] : formal caregiver [Verbal consent obtained from patient] : the patient, [unfilled] [FreeTextEntry1] : Follow up requesting medication refills. [de-identified] : Ms. SARAH BLANCHARD is an 84-year female who is following up via telehealth for medication refills. Patient is complaining of broken up sleep. Typically takes Ambien at 6:30 pm, then falls asleep until about 10 pm, lays down for some time, then falls asleep again until about 4 am.  yes...

## (undated) DEVICE — SOL BAG NS 0.9% 1000ML

## (undated) DEVICE — GOWN IMPERV XL

## (undated) DEVICE — FORCEP RADIAL JAW 4 W NDL 2.4MM 2.8MM 240CM ORANGE DISP

## (undated) DEVICE — STERIS DEFENDO 3-PIECE KIT (AIR/WATER, SUCTION & BIOPSY VALVES)

## (undated) DEVICE — DRSG CURITY GAUZE SPONGE 4 X 4" 12-PLY NON-STERILE

## (undated) DEVICE — TUBING ALARIS PUMP MODULE NON-DEHP

## (undated) DEVICE — TUBING IV EXTENSION MACRO W CLAVE 7"

## (undated) DEVICE — SOL IRR BAG H2O 1000ML

## (undated) DEVICE — UNDERPAD LINEN SAVER 23 X 36"

## (undated) DEVICE — SOL IRR BAG NS 0.9% 1000ML

## (undated) DEVICE — DRSG 2X2

## (undated) DEVICE — PACK IV START WITH CHG

## (undated) DEVICE — SYR IV FLUSH SALINE 10ML 30/TY

## (undated) DEVICE — VENODYNE/SCD SLEEVE CALF MEDIUM

## (undated) DEVICE — SNARE EXACTO COLD 9MMX230CM

## (undated) DEVICE — SYR SLIP 10CC

## (undated) DEVICE — SENSOR O2 FINGER ADULT

## (undated) DEVICE — CATH IV SAFE BC 22G X 1" (BLUE)

## (undated) DEVICE — POLY TRAP ETRAP

## (undated) DEVICE — MASK PROC EAR LOOP

## (undated) DEVICE — DENTURE CUP PINK

## (undated) DEVICE — SYR LUER SLIP TIP 50CC

## (undated) DEVICE — FORCEP RADIAL JAW 4 240CM DISP

## (undated) DEVICE — WARMING BLANKET FULL ADULT

## (undated) DEVICE — BITE BLOCK ADULT 20 X 27MM (GREEN)

## (undated) DEVICE — FORCEP BIOPSY ENDOSCOPIC 2.8MM DISP